# Patient Record
Sex: FEMALE | Race: BLACK OR AFRICAN AMERICAN | NOT HISPANIC OR LATINO | Employment: OTHER | URBAN - METROPOLITAN AREA
[De-identification: names, ages, dates, MRNs, and addresses within clinical notes are randomized per-mention and may not be internally consistent; named-entity substitution may affect disease eponyms.]

---

## 2017-01-20 ENCOUNTER — OFFICE VISIT (OUTPATIENT)
Dept: FAMILY MEDICINE | Facility: CLINIC | Age: 67
End: 2017-01-20
Payer: COMMERCIAL

## 2017-01-20 VITALS
WEIGHT: 246.94 LBS | DIASTOLIC BLOOD PRESSURE: 68 MMHG | TEMPERATURE: 98 F | HEART RATE: 76 BPM | OXYGEN SATURATION: 94 % | SYSTOLIC BLOOD PRESSURE: 144 MMHG | BODY MASS INDEX: 48.48 KG/M2 | HEIGHT: 60 IN

## 2017-01-20 DIAGNOSIS — I10 ESSENTIAL HYPERTENSION: ICD-10-CM

## 2017-01-20 DIAGNOSIS — G57.10 MERALGIA PARESTHETICA, UNSPECIFIED LATERALITY: ICD-10-CM

## 2017-01-20 DIAGNOSIS — E11.9 TYPE 2 DIABETES MELLITUS WITHOUT COMPLICATION, WITHOUT LONG-TERM CURRENT USE OF INSULIN: Primary | ICD-10-CM

## 2017-01-20 DIAGNOSIS — E03.9 HYPOTHYROIDISM, UNSPECIFIED TYPE: ICD-10-CM

## 2017-01-20 PROCEDURE — 3077F SYST BP >= 140 MM HG: CPT | Mod: S$GLB,,, | Performed by: FAMILY MEDICINE

## 2017-01-20 PROCEDURE — 1159F MED LIST DOCD IN RCRD: CPT | Mod: S$GLB,,, | Performed by: FAMILY MEDICINE

## 2017-01-20 PROCEDURE — 99214 OFFICE O/P EST MOD 30 MIN: CPT | Mod: S$GLB,,, | Performed by: FAMILY MEDICINE

## 2017-01-20 PROCEDURE — 3045F PR MOST RECENT HEMOGLOBIN A1C LEVEL 7.0-9.0%: CPT | Mod: S$GLB,,, | Performed by: FAMILY MEDICINE

## 2017-01-20 PROCEDURE — 99999 PR PBB SHADOW E&M-EST. PATIENT-LVL IV: CPT | Mod: PBBFAC,,, | Performed by: FAMILY MEDICINE

## 2017-01-20 PROCEDURE — 4010F ACE/ARB THERAPY RXD/TAKEN: CPT | Mod: S$GLB,,, | Performed by: FAMILY MEDICINE

## 2017-01-20 PROCEDURE — 3078F DIAST BP <80 MM HG: CPT | Mod: S$GLB,,, | Performed by: FAMILY MEDICINE

## 2017-01-20 RX ORDER — METOPROLOL SUCCINATE 25 MG/1
25 TABLET, EXTENDED RELEASE ORAL DAILY
Qty: 30 TABLET | Refills: 11 | Status: SHIPPED | OUTPATIENT
Start: 2017-01-20 | End: 2018-01-22 | Stop reason: SDUPTHER

## 2017-01-20 RX ORDER — TRAVOPROST 0.04 MG/ML
SOLUTION/ DROPS OPHTHALMIC
COMMUNITY
Start: 2017-01-11 | End: 2017-03-16

## 2017-01-20 NOTE — PROGRESS NOTES
(Portions of this note were dictated using voice recognition software and may contain dictation related errors in spelling/grammar/syntax not found on text review)    CC:   Chief Complaint   Patient presents with    Follow-up     1 month follow up for b/p       HPI: 66 y.o. female here for one-month blood pressure follow-up.  Was seen last month and blood pressure is elevated the patient was suffering with a cold at that time.  She is compliant with her losartan /25 daily.  Blood sugars may fluctuate in the systolic range from 140-160.  Denies any symptoms related to uncontrolled hypertension.  She also has diabetes, hypothyroidism which were evaluated by endocrinology but she has not upcoming appointment in March.  In the meantime I started on metformin therapy and increased her Synthroid 137 µg daily.  Tolerant of current medications next    She does go to the gym.  She is working on losing weight and has lost 10 pounds over the past 2 months.    Past Medical History   Diagnosis Date    Cataract     CKD (chronic kidney disease) stage 3, GFR 30-59 ml/min 3/21/2014    Diabetes mellitus type II     GERD (gastroesophageal reflux disease)     Glaucoma (increased eye pressure)     HLD (hyperlipidemia)     HTN (hypertension)     Hypothyroidism     Morbid obesity 3/21/2014    Vitamin D deficiency 3/21/2014       Past Surgical History   Procedure Laterality Date     section, low transverse         Family History   Problem Relation Age of Onset    Coronary artery disease Mother      premature, 57    Cancer Sister     No Known Problems Father     Diabetes      Hypertension      Thyroid disease      Glaucoma Maternal Uncle     Diabetes Maternal Uncle     No Known Problems Brother     No Known Problems Maternal Aunt     No Known Problems Paternal Aunt     No Known Problems Paternal Uncle     No Known Problems Maternal Grandmother     No Known Problems Maternal Grandfather     No Known  Problems Paternal Grandmother     No Known Problems Paternal Grandfather     Amblyopia Neg Hx     Blindness Neg Hx     Cataracts Neg Hx     Macular degeneration Neg Hx     Retinal detachment Neg Hx     Strabismus Neg Hx     Stroke Neg Hx        Social History     Social History    Marital status:      Spouse name: N/A    Number of children: N/A    Years of education: N/A     Occupational History    Not on file.     Social History Main Topics    Smoking status: Former Smoker     Types: Cigarettes    Smokeless tobacco: Not on file      Comment: occasionally    Alcohol use No    Drug use: No    Sexual activity: Not on file     Other Topics Concern    Not on file     Social History Narrative     Lab Results   Component Value Date    WBC 10.18 11/03/2016    HGB 14.5 11/03/2016    HCT 43.8 11/03/2016     11/03/2016    CHOL 235 (H) 11/03/2016    TRIG 155 (H) 11/03/2016    HDL 40 11/03/2016    ALT 21 11/03/2016    AST 21 11/03/2016     11/03/2016    K 4.1 11/03/2016     11/03/2016    CREATININE 1.2 11/03/2016    BUN 15 11/03/2016    CO2 27 11/03/2016    TSH 7.577 (H) 11/03/2016    INR 1.1 01/25/2005    HGBA1C 7.4 (H) 11/03/2016    LDLCALC 164.0 (H) 11/03/2016     (H) 11/03/2016       ROS:  GENERAL: No fever, chills, fatigability or weight loss.  SKIN: No rashes, no itching.  HEAD: No headaches.  EYES: No visual changes  EARS: No ear pain or changes in hearing.  NOSE: No congestion or rhinorrhea.  MOUTH & THROAT: No hoarseness, change in voice, or sore throat.  NODES: Denies swollen glands.  CHEST: Denies TODD, cyanosis, wheezing, cough and sputum production.  CARDIOVASCULAR: Denies chest pain, PND, orthopnea.  ABDOMEN: No nausea, vomiting, or changes in bowel function.  URINARY: No flank pain, dysuria or hematuria.  PERIPHERAL VASCULAR: No claudication or cyanosis.  MUSCULOSKELETAL: No joint stiffness or swelling. Denies back pain.  NEUROLOGIC: Does describe some left  lateral thigh burning pains with no radiation.    Vital signs reviewed.  Blood pressure 144/68  PE:   APPEARANCE: Well nourished, well developed, in no acute distress.    HEAD: Normocephalic, atraumatic.  EYES: PERRL. EOMI.   Conjunctivae noninjected.  EARS: TM's intact. Light reflex normal. No retraction or perforation  NOSE: Mucosa pink. Airway clear.  MOUTH & THROAT: No tonsillar enlargement. No pharyngeal erythema or exudate.   NECK: Supple with no cervical lymphadenopathy.  No carotid bruits.  No thyromegaly  CHEST: Good inspiratory effort. Lungs clear to auscultation with no wheezes or crackles.  CARDIOVASCULAR: Normal S1, S2. No rubs, murmurs, or gallops.  ABDOMEN: Bowel sounds normal. Not distended. Soft. No tenderness or masses. No organomegaly.  EXTREMITIES: 1+ right leg pitting edema at the ankle.  Trace left leg pitting edema      IMPRESSION  1. Type 2 diabetes mellitus without complication, without long-term current use of insulin    2. Essential hypertension    3. Hypothyroidism, unspecified type            PLAN  Orders Placed This Encounter   Procedures    Comprehensive metabolic panel    Hemoglobin A1c    Lipid panel    TSH     Hypertension: Still mildly suboptimal.  Will continue losartan /25 daily.  Add metoprolol succinate 25 mg daily.  Continue with lifestyle modification including healthy diet and regular exercise and weight loss    Check labs below next month with visit to follow.  Will go over lab results at that time along with blood pressure review    Discussed meralgia paresthetica etiology.  Reassurance provided.  Do think that weight loss will help with this

## 2017-01-20 NOTE — PATIENT INSTRUCTIONS
"GOAL BLOOD PRESSURE IS LESS THAN 140/90.    Continue losartan hct 100/25mg daily  Add metoprolol 25 mg daily to help further lower the blood pressure to goal.     The DASH Diet: Healthy Eating to Control Your Blood Pressure     What is the DASH diet?    DASH stands for Dietary Approaches to Stop Hypertension. It is a balanced eating plan that your family doctor might recommend to help you lower your blood pressure. The DASH diet:  Is low in salt, saturated fat, cholesterol and total fat.   Emphasizes fruits, vegetables, and fat-free or low-fat milk and milk products.   Includes whole grains, fish, poultry and nuts.   Limits the amount of red meat, sweets, added sugars and sugar-containing beverages in your diet.   Is rich in potassium, magnesium and calcium, as well as protein and fiber.     How can the DASH diet help me stay healthy?  Getting too much sodium (salt) in your diet can contribute to high blood pressure (also called hypertension). Some salt is in foods naturally, and some salt is added to food when it is processed or prepared. Following the DASH diet can help you lower your blood pressure, or prevent high blood pressure, by reducing the amount of sodium in your diet to less than 2,300 mg per day.  The fruits, vegetables and whole grains recommended in the DASH diet provide many other elements of a healthy diet, such as lycopene, beta-carotene and isoflavones. These can help protect your body against common health conditions, such as cancer, osteoporosis, stroke and diabetes. Following the DASH diet can also help reduce your risk of heart disease by lowering your low-density lipoprotein (LDL, or "bad") cholesterol level.  Following the DASH diet may drop your blood pressure by a few points in as little as 2 weeks. However, you should not stop taking your blood pressure medicine, or any other prescribed medicine, without talking to your doctor first.    What kinds of foods are included in the DASH " diet?  The DASH diet is nutritionally balanced for good health. You dont need to buy any special foods or pills, or cook with any special recipes, to follow the DASH diet. If you follow the DASH diet, you will eat about 2,000 calories each day. These calories will come from a variety of foods.    Where is sodium in my diet?  Food Serving Sodium Content   ¼ teaspoon table salt 575 mg   ½ teaspoon table salt 1,150 mg   1 teaspoon table salt 2,300 mg   1 hot dog 460 mg   1 regular fast-food hamburger 600 mg   2 ounces processed cheese 600 mg   1 tablespoon soy sauce 900 mg   1 serving frozen pizza with meat and vegetables 982 mg   8 ounces regular potato chips 1,192 mg     The DASH diet  Whole grains (6 to 8 servings a day)   Vegetables (4 to 5 servings a day)   Fruits (4 to 5 servings a day)   Low-fat or fat-free milk and milk products (2 to 3 servings a day)   Lean meats, poultry and fish (6 or fewer servings a day)   Nuts, seeds and beans (4 to 5 servings a week)   Fats and oils (2 to 3 servings a day)   Sweets, preferably low-fat or fat-free (5 or fewer a week)   Sodium (no more than 2,300 mg a day)   You can adapt the DASH diet to meet your needs. For example:  If you drink alcohol, limit yourself to 2 drinks or less per day for men and 1 drink or less per day for women.   To reduce your blood pressure even more, replace some of the carbohydrates in the DASH diet with low-fat protein and unsaturated fats.   If you need to lose weight, reduce the number of calories you eat to about 1,600 per day.   Follow a lower-sodium version (no more than 1,500 mg daily) if you are 40 years of age or older, you are  or you already have high blood pressure.     How can I change my eating habits?  Dont be discouraged if following the DASH diet is difficult at first. Start with small, achievable goals. The following ideas can help you make healthy changes:  Pay attention to your current eating habits. Make a list  "of everything you eat for 2 or 3 days. Compare this list to the DASH diet recommendations above and see what changes you need to make in your diet.   Make one change at a time. For example, start by choosing lower-fat versions of your favorite foods or adding more whole grains to your meals.   Learn what makes a serving for each type of food. For example, 1 serving equals 1 slice of bread, 8 ounces of milk, 1 cup of raw vegetables or 1/2 cup of cooked vegetables. For more serving sizes, go to the [facility] site. Dont have a measuring cup? One serving (3 ounces) of meat or poultry is about the size of a deck of cards. One serving (1/2 cup) of rice or potato looks like half a baseball, and a serving of cheese is about the size of 4 stacked dice.   If eating more fruits and vegetables gives you gas, bloating or diarrhea, increase these foods slowly. You can also talk to your family doctor about taking over-the-counter medicines to reduce these symptoms until your body adjusts.   Get more exercise. Physical activity helps lower your blood pressure and can help you lose more weight.   Use salt-free seasonings, such as spices and herbs, to add flavor to your recipes and reduce or eliminate salt.   Include as many fresh and unprocessed foods as possible. Cut back on frozen dinners, packaged mixes, canned soups and bottled salad dressings, which are often high in sodium.   When buying canned, frozen or processed foods, check nutrition labels for the amount of sodium, sugar and saturated fat. Look for the phrases "no salt added," "sodium-free," "low sodium" or "very low sodium" on food packages. Choose foods with monounsaturated and polyunsaturated fats.   Steam, grill, poach, roast or stir-blackwood foods. Use low-sodium broth or water instead of butter or oil for sautéing.   When you eat at a restaurant, ask how foods are prepared. Ask if your order can be made without added salt. Dont add salty condiments, such as ketchup, " mustard, pickles or sauces, to your food.   Other Organizations  Centers for Disease Control and Prevention   National Heart, Lung, and Blood [place]   Written by [email or web address] editorial staff  Reviewed/Updated: 02/11  Created: 08/09

## 2017-01-20 NOTE — MR AVS SNAPSHOT
[address]  [place] [address]  Phone: [phone]  Fax: [phone]                  Hunter Cisneros   2017 10:40 AM   Office Visit    Description:  Female : 1950   Provider:  Lenny Javier MD   Department:  [facility]           Reason for Visit     Follow-up           Diagnoses this Visit        Comments    Type 2 diabetes mellitus without complication, without long-term current use of insulin    -  Primary     Essential hypertension         Hypothyroidism, unspecified type                To Do List           Future Appointments        Provider Department Dept Phone    3/6/2017 10:30 AM MD Chepe Anderson Counts include 234 beds at the [facility] - Ophthalmology [phone]    3/7/2017 7:20 AM LAB, [place] [facility] [phone]    3/13/2017 10:20 AM Lenny Javier MD [facility] [phone]    3/14/2017 10:00 AM MD Chepe Croft Counts include 234 beds at the [facility] - Endo/Diab/Metab [phone]    3/21/2017 10:00 AM LAB, [place] [facility] [phone]      Goals (5 Years of Data)     None       These Medications        Disp Refills Start End    metoprolol succinate (TOPROL-XL) 25 MG 24 hr tablet 30 tablet 11 2017    Take 1 tablet (25 mg total) by mouth once daily. - Oral    Pharmacy: [facility] [address] [place] [address] [facility] Ph #: [phone]         [facility]     [facility] Care [facility] -  Assistance  Registered nurses in the [facility] provide clinical advisement, health education, appointment booking, and other advisory services.  Call for this free service at [phone].             Medications           Message regarding Medications     Verify the changes and/or additions to your medication regime listed below are the same as discussed with your clinician today.  If any of these changes or additions are incorrect, please notify  your healthcare provider.        START taking these NEW medications        Refills    metoprolol succinate (TOPROL-XL) 25 MG 24 hr tablet 11    Sig: Take 1 tablet (25 mg total) by mouth once daily.    Class: Normal    Route: Oral           Verify that the below list of medications is an accurate representation of the medications you are currently taking.  If none reported, the list may be blank. If incorrect, please contact your healthcare provider. Carry this list with you in case of emergency.           Current Medications     aspirin (ECOTRIN) 81 MG EC tablet Take 81 mg by mouth once daily.      atorvastatin (LIPITOR) 80 MG tablet Take 1 tablet (80 mg total) by mouth once daily.    blood sugar diagnostic (CONTOUR TEST STRIPS) Strp USE ONE  TO CHECK GLUCOSE TWICE DAILY    lancets (MICROLET LANCET) Misc Test BG 2 times daily    latanoprost 0.005 % ophthalmic solution Place 1 drop into both eyes every evening.    levothyroxine (SYNTHROID) 137 MCG Tab tablet Take 1 tablet (137 mcg total) by mouth before breakfast.    losartan-hydrochlorothiazide 100-25 mg (HYZAAR) 100-25 mg per tablet Take 1 tablet by mouth once daily.    metformin (GLUCOPHAGE) 500 MG tablet Take 1 tablet (500 mg total) by mouth 2 (two) times daily with meals.    omega 3-dha-epa-other om3-D3 (OMEGA-3 + VITAMIN D3) 2,200 mg-1,000 unit/5 mL Liqd Take by mouth.    omega-3 fatty acids 1,000 mg Cap Take 1 capsule by mouth 3 (three) times daily.     timolol maleate 0.5% (TIMOPTIC-XE) 0.5 % SolG     TRAVATAN Z 0.004 % Drop     wheat dextrin (BENEFIBER CLEAR SF, DEXTRIN,) 3 gram/3.5 gram PwPk     metoprolol succinate (TOPROL-XL) 25 MG 24 hr tablet Take 1 tablet (25 mg total) by mouth once daily.           Clinical Reference Information           Vital Signs - Last Recorded  Most recent update: 1/20/2017 10:50 AM by Lenny Javier MD    BP Pulse Temp Ht Wt SpO2    (!) 144/68 76 98.2 °F (36.8 °C) (Oral) 5' (1.524 m) 112 kg (246 lb 14.6 oz) (!) 94%    BMI                 48.22 kg/m2          Blood Pressure          Most Recent Value    BP  (!)  144/68      Allergies as of 1/20/2017     Lisinopril      Immunizations Administered on Date of Encounter - 1/20/2017     None      Orders Placed During Today's Visit     Future Labs/Procedures Expected by Expires    Comprehensive metabolic panel  1/20/2017 1/20/2018    Lipid panel  1/20/2017 1/20/2018    TSH  1/20/2017 1/20/2018    Hemoglobin A1c  As directed 1/20/2018      [facility] Sign-Up     Activating your [facility] account is as easy as 1-2-3!     1) Visit [email or web address], select Sign Up Now, enter this activation code and your date of birth, then select Next.  [email or web address]  Expires: 3/6/2017 11:02 AM      2) Create a username and password to use when you visit [facility] in the future and select a security question in case you lose your password and select Next.    3) Enter your e-mail address and click Sign Up!    Additional Information  If you have questions, please e-mail [email or web address] or call [phone] to talk to our [facility] staff. Remember, [facility] is NOT to be used for urgent needs. For medical emergencies, dial 911.         Instructions    GOAL BLOOD PRESSURE IS LESS THAN 140/90.    Continue losartan hct 100/25mg daily  Add metoprolol 25 mg daily to help further lower the blood pressure to goal.     The DASH Diet: Healthy Eating to Control Your Blood Pressure     What is the DASH diet?    DASH stands for Dietary Approaches to Stop Hypertension. It is a balanced eating plan that your family doctor might recommend to help you lower your blood pressure. The DASH diet:  Is low in salt, saturated fat, cholesterol and total fat.   Emphasizes fruits, vegetables, and fat-free or low-fat milk and milk products.   Includes whole grains, fish, poultry and nuts.   Limits the amount of red meat, sweets, added sugars and sugar-containing beverages in your diet.   Is rich in potassium, magnesium and calcium,  "as well as protein and fiber.     How can the DASH diet help me stay healthy?  Getting too much sodium (salt) in your diet can contribute to high blood pressure (also called hypertension). Some salt is in foods naturally, and some salt is added to food when it is processed or prepared. Following the DASH diet can help you lower your blood pressure, or prevent high blood pressure, by reducing the amount of sodium in your diet to less than 2,300 mg per day.  The fruits, vegetables and whole grains recommended in the DASH diet provide many other elements of a healthy diet, such as lycopene, beta-carotene and isoflavones. These can help protect your body against common health conditions, such as cancer, osteoporosis, stroke and diabetes. Following the DASH diet can also help reduce your risk of heart disease by lowering your low-density lipoprotein (LDL, or "bad") cholesterol level.  Following the DASH diet may drop your blood pressure by a few points in as little as 2 weeks. However, you should not stop taking your blood pressure medicine, or any other prescribed medicine, without talking to your doctor first.    What kinds of foods are included in the DASH diet?  The DASH diet is nutritionally balanced for good health. You dont need to buy any special foods or pills, or cook with any special recipes, to follow the DASH diet. If you follow the DASH diet, you will eat about 2,000 calories each day. These calories will come from a variety of foods.    Where is sodium in my diet?  Food Serving Sodium Content   ¼ teaspoon table salt 575 mg   ½ teaspoon table salt 1,150 mg   1 teaspoon table salt 2,300 mg   1 hot dog 460 mg   1 regular fast-food hamburger 600 mg   2 ounces processed cheese 600 mg   1 tablespoon soy sauce 900 mg   1 serving frozen pizza with meat and vegetables 982 mg   8 ounces regular potato chips 1,192 mg     The DASH diet  Whole grains (6 to 8 servings a day)   Vegetables (4 to 5 servings a day) "   Fruits (4 to 5 servings a day)   Low-fat or fat-free milk and milk products (2 to 3 servings a day)   Lean meats, poultry and fish (6 or fewer servings a day)   Nuts, seeds and beans (4 to 5 servings a week)   Fats and oils (2 to 3 servings a day)   Sweets, preferably low-fat or fat-free (5 or fewer a week)   Sodium (no more than 2,300 mg a day)   You can adapt the DASH diet to meet your needs. For example:  If you drink alcohol, limit yourself to 2 drinks or less per day for men and 1 drink or less per day for women.   To reduce your blood pressure even more, replace some of the carbohydrates in the DASH diet with low-fat protein and unsaturated fats.   If you need to lose weight, reduce the number of calories you eat to about 1,600 per day.   Follow a lower-sodium version (no more than 1,500 mg daily) if you are 40 years of age or older, you are  or you already have high blood pressure.     How can I change my eating habits?  Dont be discouraged if following the DASH diet is difficult at first. Start with small, achievable goals. The following ideas can help you make healthy changes:  Pay attention to your current eating habits. Make a list of everything you eat for 2 or 3 days. Compare this list to the DASH diet recommendations above and see what changes you need to make in your diet.   Make one change at a time. For example, start by choosing lower-fat versions of your favorite foods or adding more whole grains to your meals.   Learn what makes a serving for each type of food. For example, 1 serving equals 1 slice of bread, 8 ounces of milk, 1 cup of raw vegetables or 1/2 cup of cooked vegetables. For more serving sizes, go to the [facility] site. Dont have a measuring cup? One serving (3 ounces) of meat or poultry is about the size of a deck of cards. One serving (1/2 cup) of rice or potato looks like half a baseball, and a serving of cheese is about the size of 4 stacked dice.   If eating more  "fruits and vegetables gives you gas, bloating or diarrhea, increase these foods slowly. You can also talk to your family doctor about taking over-the-counter medicines to reduce these symptoms until your body adjusts.   Get more exercise. Physical activity helps lower your blood pressure and can help you lose more weight.   Use salt-free seasonings, such as spices and herbs, to add flavor to your recipes and reduce or eliminate salt.   Include as many fresh and unprocessed foods as possible. Cut back on frozen dinners, packaged mixes, canned soups and bottled salad dressings, which are often high in sodium.   When buying canned, frozen or processed foods, check nutrition labels for the amount of sodium, sugar and saturated fat. Look for the phrases "no salt added," "sodium-free," "low sodium" or "very low sodium" on food packages. Choose foods with monounsaturated and polyunsaturated fats.   Steam, grill, poach, roast or stir-blackwood foods. Use low-sodium broth or water instead of butter or oil for sautéing.   When you eat at a restaurant, ask how foods are prepared. Ask if your order can be made without added salt. Dont add salty condiments, such as ketchup, mustard, pickles or sauces, to your food.   Other Organizations  Centers for Disease Control and Prevention   National Heart, Lung, and Blood [place]   Written by [email or web address] editorial staff  Reviewed/Updated: 02/11  Created: 08/09            "

## 2017-03-06 ENCOUNTER — OFFICE VISIT (OUTPATIENT)
Dept: OPHTHALMOLOGY | Facility: CLINIC | Age: 67
End: 2017-03-06
Payer: COMMERCIAL

## 2017-03-06 DIAGNOSIS — H40.053 OCULAR HYPERTENSION, BILATERAL: ICD-10-CM

## 2017-03-06 DIAGNOSIS — H40.053 BILATERAL OCULAR HYPERTENSION: Primary | ICD-10-CM

## 2017-03-06 PROCEDURE — 99999 PR PBB SHADOW E&M-EST. PATIENT-LVL II: CPT | Mod: PBBFAC,,, | Performed by: OPHTHALMOLOGY

## 2017-03-06 PROCEDURE — 92133 CPTRZD OPH DX IMG PST SGM ON: CPT | Mod: S$GLB,,, | Performed by: OPHTHALMOLOGY

## 2017-03-06 PROCEDURE — 92014 COMPRE OPH EXAM EST PT 1/>: CPT | Mod: S$GLB,,, | Performed by: OPHTHALMOLOGY

## 2017-03-06 RX ORDER — LATANOPROST 50 UG/ML
1 SOLUTION/ DROPS OPHTHALMIC NIGHTLY
Qty: 2.5 ML | Refills: 11 | Status: SHIPPED | OUTPATIENT
Start: 2017-03-06 | End: 2018-03-30 | Stop reason: SDUPTHER

## 2017-03-06 NOTE — MR AVS SNAPSHOT
[facility]  [address]  [facility] [address]  Phone: [phone]  Fax: [phone]                  Hunter Cisneros   3/6/2017 10:30 AM   Office Visit    Description:  Female : 1950   Provider:  Todd Sykes MD   Department:  [facility]           Reason for Visit     Glaucoma           Diagnoses this Visit        Comments    Bilateral ocular hypertension    -  Primary     Ocular hypertension, bilateral         Nuclear cataract of both eyes                To Do List           Future Appointments        Provider Department Dept Phone    3/7/2017 7:20 AM LAB, [facility] [phone]    3/14/2017 10:00 AM MD [facility] End[facility]Diab/Metab [phone]    3/16/2017 8:40 AM Lenny Javier MD [facility] [phone]    3/21/2017 10:00 AM LAB, [place] [facility] [phone]      Goals (5 Years of Data)     None       These Medications        Disp Refills Start End    latanoprost 0.005 % ophthalmic solution 2.5 mL 11 3/6/2017     Place 1 drop into both eyes every evening. - Both Eyes    Pharmacy: [facility] [address] [place] [address] [facility] Ph #: [phone]         [facility] On Call     [facility] Care Line -  Assistance  Registered nurses in the [facility] provide clinical advisement, health education, appointment booking, and other advisory services.  Call for this free service at [phone].             Medications           Message regarding Medications     Verify the changes and/or additions to your medication regime listed below are the same as discussed with your clinician today.  If any of these changes or additions are incorrect, please notify your healthcare provider.             Verify that the below list of medications is an accurate representation of the medications you are currently taking.  If none reported,  the list may be blank. If incorrect, please contact your healthcare provider. Carry this list with you in case of emergency.           Current Medications     aspirin (ECOTRIN) 81 MG EC tablet Take 81 mg by mouth once daily.      atorvastatin (LIPITOR) 80 MG tablet Take 1 tablet (80 mg total) by mouth once daily.    blood sugar diagnostic (CONTOUR TEST STRIPS) Strp USE ONE  TO CHECK GLUCOSE TWICE DAILY    lancets (MICROLET LANCET) Misc Test BG 2 times daily    latanoprost 0.005 % ophthalmic solution Place 1 drop into both eyes every evening.    levothyroxine (SYNTHROID) 137 MCG Tab tablet Take 1 tablet (137 mcg total) by mouth before breakfast.    losartan-hydrochlorothiazide 100-25 mg (HYZAAR) 100-25 mg per tablet Take 1 tablet by mouth once daily.    metformin (GLUCOPHAGE) 500 MG tablet Take 1 tablet (500 mg total) by mouth 2 (two) times daily with meals.    metoprolol succinate (TOPROL-XL) 25 MG 24 hr tablet Take 1 tablet (25 mg total) by mouth once daily.    omega 3-dha-epa-other om3-D3 (OMEGA-3 + VITAMIN D3) 2,200 mg-1,000 unit/5 mL Liqd Take by mouth.    omega-3 fatty acids 1,000 mg Cap Take 1 capsule by mouth 3 (three) times daily.     timolol maleate 0.5% (TIMOPTIC-XE) 0.5 % SolG     TRAVATAN Z 0.004 % Drop     wheat dextrin (BENEFIBER CLEAR SF, DEXTRIN,) 3 gram/3.5 gram Pwk            Clinical Reference Information           Allergies as of 3/6/2017     Lisinopril      Immunizations Administered on Date of Encounter - 3/6/2017     None      Orders Placed During Today's Visit      Normal Orders This Visit    Posterior Segment OCT Optic Nerve- Both eyes     Future Labs/Procedures Expected by Expires    [place] Visual Field - OU - Extended - Both Eyes  As directed 3/6/2018      [facility] Sign-Up     Activating your [facility] account is as easy as 1-2-3!     1) Visit [email or web address], select Sign Up Now, enter this activation code and your date of birth, then select Next.  [email or web address]  Expires: 4/20/2017  12:41 PM      2) Create a username and password to use when you visit [facility] in the future and select a security question in case you lose your password and select Next.    3) Enter your e-mail address and click Sign Up!    Additional Information  If you have questions, please e-mail [email or web address] or call [phone] to talk to our [facility] staff. Remember, [facility] is NOT to be used for urgent needs. For medical emergencies, dial 911.         Language Assistance Services     ATTENTION: Language assistance services are available, free of charge. Please call [phone].      ATENCIÓN: Si habla dex, tiene a boyle disposición servicios gratuitos de asistencia lingüística. Llame al [phone].     CHÚ Ý: N?u b?n nói Ti?ng Vi?t, có các d?ch v? h? tr? ngôn ng? mi?n phí dành cho b?n. G?i s? [phone].         [facility] complies with applicable Federal civil rights laws and does not discriminate on the basis of race, color, national origin, age, disability, or sex.

## 2017-03-06 NOTE — PROGRESS NOTES
HPI     Glaucoma    Additional comments: 6 mon iop chk/oct/dfe           Comments     Glaucoma   Type 2 diabetes  NSC OU     Timolol Q AM OU   Travatan Z Q HS OU       Last edited by Marcos Alvarado on 3/6/2017 10:46 AM. (History)            Assessment /Plan     For exam results, see Encounter Report.    Bilateral ocular hypertension  -     [place]ona Visual Field - OU - Extended - Both Eyes; Future    Ocular hypertension, bilateral  -     Posterior Segment OCT Optic Nerve- Both eyes    Nuclear cataract of both eyes      OHT  Tpen in teens likely OK--> valsalva body habitus     +/- HVF taker      CCT  518 / 519      Hi teens / 20's      Both eye --> good adherence  Chidi q day --> Xal q day switch cost  Nemesio 0.5% q AM    NIDDM  No BDR / CSME  Control    NSC OU  Observe      Plan  RTC 4 months IOP & HVF --> switched to Xal q day IOP check  RTC sooner prn with good understanding

## 2017-03-07 ENCOUNTER — LAB VISIT (OUTPATIENT)
Dept: LAB | Facility: HOSPITAL | Age: 67
End: 2017-03-07
Attending: FAMILY MEDICINE
Payer: COMMERCIAL

## 2017-03-07 DIAGNOSIS — E03.9 HYPOTHYROIDISM, UNSPECIFIED TYPE: ICD-10-CM

## 2017-03-07 DIAGNOSIS — E11.9 TYPE 2 DIABETES MELLITUS WITHOUT COMPLICATION, WITHOUT LONG-TERM CURRENT USE OF INSULIN: ICD-10-CM

## 2017-03-07 DIAGNOSIS — I10 ESSENTIAL HYPERTENSION: ICD-10-CM

## 2017-03-07 LAB
ALBUMIN SERPL BCP-MCNC: 4 G/DL
ALP SERPL-CCNC: 83 IU/L
ALT SERPL W/O P-5'-P-CCNC: 24 IU/L
ANION GAP SERPL CALC-SCNC: 12 MMOL/L
AST SERPL-CCNC: 19 IU/L
BILIRUB SERPL-MCNC: 0.5 MG/DL
BUN SERPL-MCNC: 19 MG/DL
CALCIUM SERPL-MCNC: 9.8 MG/DL
CHLORIDE SERPL-SCNC: 102 MMOL/L
CHOLEST/HDLC SERPL: 6 {RATIO}
CO2 SERPL-SCNC: 29 MMOL/L
CREAT SERPL-MCNC: 1.27 MG/DL
EST. GFR  (AFRICAN AMERICAN): 51.5 ML/MIN/1.73 M^2
EST. GFR  (NON AFRICAN AMERICAN): 44.7 ML/MIN/1.73 M^2
GLUCOSE SERPL-MCNC: 114 MG/DL
HDL/CHOLESTEROL RATIO: 16.6 %
HDLC SERPL-MCNC: 211 MG/DL
HDLC SERPL-MCNC: 35 MG/DL
LDLC SERPL CALC-MCNC: 143 MG/DL
NONHDLC SERPL-MCNC: 176 MG/DL
POTASSIUM SERPL-SCNC: 4.6 MMOL/L
PROT SERPL-MCNC: 7.7 G/DL
SODIUM SERPL-SCNC: 143 MMOL/L
TRIGL SERPL-MCNC: 165 MG/DL

## 2017-03-07 PROCEDURE — 80053 COMPREHEN METABOLIC PANEL: CPT | Mod: PO

## 2017-03-07 PROCEDURE — 36415 COLL VENOUS BLD VENIPUNCTURE: CPT | Mod: PO

## 2017-03-07 PROCEDURE — 84443 ASSAY THYROID STIM HORMONE: CPT

## 2017-03-07 PROCEDURE — 83036 HEMOGLOBIN GLYCOSYLATED A1C: CPT | Mod: PO

## 2017-03-07 PROCEDURE — 80061 LIPID PANEL: CPT

## 2017-03-08 LAB
ESTIMATED AVG GLUCOSE: 134 MG/DL
HBA1C MFR BLD HPLC: 6.3 %
TSH SERPL DL<=0.005 MIU/L-ACNC: 3.81 UIU/ML

## 2017-03-14 ENCOUNTER — OFFICE VISIT (OUTPATIENT)
Dept: ENDOCRINOLOGY | Facility: CLINIC | Age: 67
End: 2017-03-14
Payer: COMMERCIAL

## 2017-03-14 VITALS
BODY MASS INDEX: 46.75 KG/M2 | HEIGHT: 60 IN | DIASTOLIC BLOOD PRESSURE: 66 MMHG | SYSTOLIC BLOOD PRESSURE: 136 MMHG | WEIGHT: 238.13 LBS | HEART RATE: 76 BPM

## 2017-03-14 DIAGNOSIS — E55.9 VITAMIN D DEFICIENCY: ICD-10-CM

## 2017-03-14 DIAGNOSIS — E89.0 POSTABLATIVE HYPOTHYROIDISM: ICD-10-CM

## 2017-03-14 DIAGNOSIS — E11.9 TYPE 2 DIABETES MELLITUS WITHOUT COMPLICATION, WITHOUT LONG-TERM CURRENT USE OF INSULIN: Primary | ICD-10-CM

## 2017-03-14 DIAGNOSIS — E78.5 OTHER AND UNSPECIFIED HYPERLIPIDEMIA: ICD-10-CM

## 2017-03-14 DIAGNOSIS — I10 ESSENTIAL HYPERTENSION: ICD-10-CM

## 2017-03-14 DIAGNOSIS — E66.01 MORBID OBESITY, UNSPECIFIED OBESITY TYPE: ICD-10-CM

## 2017-03-14 DIAGNOSIS — N18.30 CKD (CHRONIC KIDNEY DISEASE) STAGE 3, GFR 30-59 ML/MIN: ICD-10-CM

## 2017-03-14 PROCEDURE — 3078F DIAST BP <80 MM HG: CPT | Mod: S$GLB,,, | Performed by: INTERNAL MEDICINE

## 2017-03-14 PROCEDURE — 99214 OFFICE O/P EST MOD 30 MIN: CPT | Mod: S$GLB,,, | Performed by: INTERNAL MEDICINE

## 2017-03-14 PROCEDURE — 1160F RVW MEDS BY RX/DR IN RCRD: CPT | Mod: S$GLB,,, | Performed by: INTERNAL MEDICINE

## 2017-03-14 PROCEDURE — 3044F HG A1C LEVEL LT 7.0%: CPT | Mod: S$GLB,,, | Performed by: INTERNAL MEDICINE

## 2017-03-14 PROCEDURE — 4010F ACE/ARB THERAPY RXD/TAKEN: CPT | Mod: S$GLB,,, | Performed by: INTERNAL MEDICINE

## 2017-03-14 PROCEDURE — 99999 PR PBB SHADOW E&M-EST. PATIENT-LVL IV: CPT | Mod: PBBFAC,,, | Performed by: INTERNAL MEDICINE

## 2017-03-14 PROCEDURE — 3075F SYST BP GE 130 - 139MM HG: CPT | Mod: S$GLB,,, | Performed by: INTERNAL MEDICINE

## 2017-03-14 RX ORDER — FLUCONAZOLE 150 MG/1
150 TABLET ORAL ONCE
Qty: 1 TABLET | Refills: 0 | Status: SHIPPED | OUTPATIENT
Start: 2017-03-14 | End: 2017-03-14

## 2017-03-14 NOTE — PROGRESS NOTES
Subjective:       Patient ID: Hunter Cisneros is a 66 y.o. female.    Chief Complaint: No chief complaint on file.    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   Was taking 125mcg daily and this was increased to 137mcg/day in 11/2016 for elevated TSH 7.5  Denies Fatigue, Palpitations, Diarrhea/constipation  Sometimes heat intolerance   +dry skin at elbows     Is morbidly obese:   Lost almost 20 lbs since 11/2016  Gym-4-5 days per week; pt started in June 2015  Staying away from fast food. Has decreased portions. Trying to cut back on carbs    she was diagnosed with T2DM 7/11, with CKD 3  found on routine labs. went to education   +nocturia, taking diuretic at bid   no vision changes   No paresthesias   last eye exam 3/2017  On metformin 500mg bid and tolerating well   SMBG  Fasting 100s  Bedtime 120s  No hypoglycemia     CKD 3  Followed by renal     HTN, taking losartan-HCT    HLD, on lipitor 80mg qhs      Vit D def  Taking 1000 iu daily   Denies fractures       Review of Systems   Constitutional: Negative for fatigue and unexpected weight change (intentional 20 lbs wt loss).   Eyes: Negative for visual disturbance.   Respiratory: Negative for shortness of breath.    Cardiovascular: Negative for chest pain.   Gastrointestinal: Negative for abdominal pain.   Endocrine: Positive for heat intolerance.   Musculoskeletal: Negative for arthralgias.   Skin: Negative for wound.   Neurological: Negative for headaches.   Hematological: Does not bruise/bleed easily.   Psychiatric/Behavioral: Negative for sleep disturbance.       Objective:      Physical Exam   HENT:   Supraclavicular fullness     Neck: No thyromegaly present.   +acanthosis     Cardiovascular: Normal rate.    Pulmonary/Chest: Effort normal.   Abdominal: Soft.   +central obesity   No purple striae   Musculoskeletal: She exhibits no  edema.   Neurological:   Feet-sensation intact to vibratory and monofilament    Skin:   No bruises   Vitals reviewed.        Results for LEXII WALDROP (MRN [id number]) as of 3/14/2017 10:02   Ref. Range 11/3/2016 11:16 3/7/2017 07:28   TSH Latest Ref Range: 0.400 - 4.000 uIU/mL 7.577 (H) 3.807   Free T4 Latest Ref Range: 0.71 - 1.51 ng/dL 0.73        Results for LEXII WALDROP (MRN [id number]) as of 3/14/2017 10:02   Ref. Range 11/3/2016 11:16 3/7/2017 07:28   Hemoglobin A1C Latest Ref Range: 4.5 - 6.2 % 7.4 (H) 6.3 (H)     Results for LEXII WALDROP (MRN [id number]) as of 3/14/2017 10:35   Ref. Range 11/3/2016 15:30   Microalb Creat Ratio Latest Ref Range: 0.0 - 30.0 ug/mg 7.1       Results for LEXII WALDROP (MRN [id number]) as of 3/14/2017 10:02   Ref. Range 3/7/2017 07:28   Sodium Latest Ref Range: 136 - 145 mmol/L 143   Potassium Latest Ref Range: 3.5 - 5.1 mmol/L 4.6   Chloride Latest Ref Range: 95 - 110 mmol/L 102   CO2 Latest Ref Range: 23 - 29 mmol/L 29   Anion Gap Latest Ref Range: 8 - 16 mmol/L 12   BUN, Bld Latest Ref Range: 7 - 17 mg/dL 19 (H)   Creatinine Latest Ref Range: 0.50 - 1.40 mg/dL 1.27   eGFR if non African American Latest Ref Range: >60 mL/min/1.73 m^2 44.7 (A)   eGFR if African American Latest Ref Range: >60 mL/min/1.73 m^2 51.5 (A)   Glucose Latest Ref Range: 70 - 110 mg/dL 114 (H)   Calcium Latest Ref Range: 8.7 - 10.5 mg/dL 9.8   Alkaline Phosphatase Latest Ref Range: 38 - 126 IU/L 83   Total Protein Latest Ref Range: 6.0 - 8.4 g/dL 7.7   Albumin Latest Ref Range: 3.5 - 5.2 g/dL 4.0   Total Bilirubin Latest Ref Range: 0.1 - 1.0 mg/dL 0.5   AST Latest Ref Range: 15 - 46 IU/L 19   ALT Latest Ref Range: 10 - 44 IU/L 24   Triglycerides Latest Ref Range: 30 - 150 mg/dL 165 (H)       Results for LEXII WALDROP (MRN [id number]) as of 3/14/2017 10:02   Ref. Range 3/7/2017 07:28   Cholesterol Latest Ref Range: 120 - 199 mg/dL 211 (H)   HDL Latest Ref Range: 40 - 75 mg/dL 35 (L)   LDL Cholesterol  Latest Ref Range: 63.0 - 159.0 mg/dL 143.0   Total Cholesterol/HDL Ratio Latest Ref Range: 2.0 - 5.0  6.0 (H)   Triglycerides Latest Ref Range: 30 - 150 mg/dL 165 (H)       Assessment:       1. Type 2 diabetes mellitus without complication, without long-term current use of insulin    2. Essential hypertension    3. Postablative hypothyroidism    4. CKD (chronic kidney disease) stage 3, GFR 30-59 ml/min    5. Other and unspecified hyperlipidemia    6. Morbid obesity, unspecified obesity type    7. Vitamin D deficiency        Plan:           1. Hypothyroidism s/p I-131-   clinically and biochemically euthyroid  Cont LT4 137mcg/day   Repeat TSH in 6 months     2. HTN- Controlled    3 T2DM with CKD 3 (very mild GFRs 55)   Continue metformin 500mg bid   To help with weight loss, will add invokana 100mg daily, discussed risk of hypotension, to stay hydrated and given diflucan x1 prn   HgbA1c in 6 months  -     canagliflozin (INVOKANA) 100 mg Tab; Take 1 tablet (100 mg total) by mouth once daily.  -     fluconazole (DIFLUCAN) 150 MG Tab; Take 1 tablet (150 mg total) by mouth once.    4 dyslipidemia  Per pcp, cont lipitor     5 vit D deficiency -   Now replete. continue OTC 1000 IU QD    6-CKD stage 3  Monitor. Avoid NSAIDs.     7-Morbid Obesity  Cont exercise, discussed decreasing white starches   To try SGLT for both DM and wt loss   Consider GLP in future as well   Would like to avoid bariatrics at this time     rtc 1 year  But repeat TSH and A1c in 6 months   Discussed w Dr Aguayo

## 2017-03-14 NOTE — MR AVS SNAPSHOT
[facility]  [address]  [facility] [address]  Phone: [phone]  Fax: [phone]                  Hunter Cisneros   3/14/2017 10:00 AM   Office Visit    Description:  Female : 1950   Provider:  Renetta Guerra MD   Department:  [facility]Metab           Reason for Visit     Diabetes Mellitus     thyroid           Diagnoses this Visit        Comments    Type 2 diabetes mellitus without complication, without long-term current use of insulin    -  Primary     Essential hypertension         Postablative hypothyroidism         CKD (chronic kidney disease) stage 3, GFR 30-59 ml/min         Other and unspecified hyperlipidemia         Morbid obesity, unspecified obesity type         Vitamin D deficiency                To Do List           Future Appointments        Provider Department Dept Phone    3/16/2017 8:40 AM Lenny Javier MD [facility] [phone]    3/21/2017 10:00 AM LAB, [place] [facility] [phone]    2017 7:30 AM LAB, [place] [facility] [phone]      Goals (5 Years of Data)     None      Follow-Up and Disposition     Return in about 1 year (around 3/14/2018).       These Medications        Disp Refills Start End    canagliflozin (INVOKANA) 100 mg Tab 30 tablet 6 3/14/2017     Take 1 tablet (100 mg total) by mouth once daily. - Oral    Pharmacy: [facility] [address] [place] [address] [facility] Ph [facility]: [phone]       fluconazole (DIFLUCAN) 150 MG Tab 1 tablet 0 3/14/2017 3/14/2017    Take 1 tablet (150 mg total) by mouth once. - Oral    Pharmacy: [facility] [address] [place] [address] [facility] Ph #: [phone]         [facility] On Call     [facility] On Call Nurse Care Line -  Assistance  Registered nurses in the [facility] provide clinical advisement, health education, appointment booking, and other advisory services.  Call for this  free service at [phone].             Medications           Message regarding Medications     Verify the changes and/or additions to your medication regime listed below are the same as discussed with your clinician today.  If any of these changes or additions are incorrect, please notify your healthcare provider.        START taking these NEW medications        Refills    canagliflozin (INVOKANA) 100 mg Tab 6    Sig: Take 1 tablet (100 mg total) by mouth once daily.    Class: Normal    Route: Oral    fluconazole (DIFLUCAN) 150 MG Tab 0    Sig: Take 1 tablet (150 mg total) by mouth once.    Class: Normal    Route: Oral           Verify that the below list of medications is an accurate representation of the medications you are currently taking.  If none reported, the list may be blank. If incorrect, please contact your healthcare provider. Carry this list with you in case of emergency.           Current Medications     aspirin (ECOTRIN) 81 MG EC tablet Take 81 mg by mouth once daily.      atorvastatin (LIPITOR) 80 MG tablet Take 1 tablet (80 mg total) by mouth once daily.    blood sugar diagnostic (CONTOUR TEST STRIPS) Strp USE ONE  TO CHECK GLUCOSE TWICE DAILY    lancets (MICROLET LANCET) Misc Test BG 2 times daily    latanoprost 0.005 % ophthalmic solution Place 1 drop into both eyes every evening.    levothyroxine (SYNTHROID) 137 MCG Tab tablet Take 1 tablet (137 mcg total) by mouth before breakfast.    losartan-hydrochlorothiazide 100-25 mg (HYZAAR) 100-25 mg per tablet Take 1 tablet by mouth once daily.    metformin (GLUCOPHAGE) 500 MG tablet Take 1 tablet (500 mg total) by mouth 2 (two) times daily with meals.    metoprolol succinate (TOPROL-XL) 25 MG 24 hr tablet Take 1 tablet (25 mg total) by mouth once daily.    omega 3-dha-epa-other om3-D3 (OMEGA-3 + VITAMIN D3) 2,200 mg-1,000 unit/5 mL Liqd Take by mouth.    omega-3 fatty acids 1,000 mg Cap Take 1 capsule by mouth 3 (three) times daily.     timolol  maleate 0.5% (TIMOPTIC-XE) 0.5 % SolG     TRAVATAN Z 0.004 % Drop     wheat dextrin (BENEFIBER CLEAR SF, DEXTRIN,) 3 gram/3.5 gram PwPk     canagliflozin (INVOKANA) 100 mg Tab Take 1 tablet (100 mg total) by mouth once daily.    fluconazole (DIFLUCAN) 150 MG Tab Take 1 tablet (150 mg total) by mouth once.           Clinical Reference Information           Your Vitals Were     BP Pulse Height Weight BMI    136/66 76 5' (1.524 m) 108 kg (238 lb 1.6 oz) 46.5 kg/m2      Blood Pressure          Most Recent Value    BP  136/66      Allergies as of 3/14/2017     Lisinopril      Immunizations Administered on Date of Encounter - 3/14/2017     None      Orders Placed During Today's Visit     Future Labs/Procedures Expected by Expires    HEMOGLOBIN A1C  3/14/2017 5/13/2018    TSH  3/14/2017 5/13/2018      [facility] Sign-Up     Activating your [facility] account is as easy as 1-2-3!     1) Visit [email or web address], select Sign Up Now, enter this activation code and your date of birth, then select Next.  [email or web address]  Expires: 4/20/2017  1:41 PM      2) Create a username and password to use when you visit [facility] in the future and select a security question in case you lose your password and select Next.    3) Enter your e-mail address and click Sign Up!    Additional Information  If you have questions, please e-mail [email or web address] or call [phone] to talk to our [facility] staff. Remember, [facility] is NOT to be used for urgent needs. For medical emergencies, dial 911.         Language Assistance Services     ATTENTION: Language assistance services are available, free of charge. Please call [phone].      ATENCIÓN: Si habla español, tiene a boyle disposición servicios gratuitos de asistencia lingüística. Llame al [phone].     CHÚ Ý: N?u b?n nói Ti?ng Vi?t, có các d?ch v? h? tr? ngôn ng? mi?n phí dành cho b?n. G?i s? [phone].         [facility] complies with applicable Federal civil  rights laws and does not discriminate on the basis of race, color, national origin, age, disability, or sex.

## 2017-03-16 ENCOUNTER — TELEPHONE (OUTPATIENT)
Dept: ENDOCRINOLOGY | Facility: CLINIC | Age: 67
End: 2017-03-16

## 2017-03-16 ENCOUNTER — OFFICE VISIT (OUTPATIENT)
Dept: FAMILY MEDICINE | Facility: CLINIC | Age: 67
End: 2017-03-16
Payer: COMMERCIAL

## 2017-03-16 VITALS
WEIGHT: 238.31 LBS | SYSTOLIC BLOOD PRESSURE: 132 MMHG | HEART RATE: 73 BPM | HEIGHT: 60 IN | DIASTOLIC BLOOD PRESSURE: 65 MMHG | OXYGEN SATURATION: 96 % | BODY MASS INDEX: 46.79 KG/M2

## 2017-03-16 DIAGNOSIS — E78.5 HYPERLIPIDEMIA, UNSPECIFIED HYPERLIPIDEMIA TYPE: Primary | ICD-10-CM

## 2017-03-16 DIAGNOSIS — E11.9 TYPE 2 DIABETES MELLITUS WITHOUT COMPLICATION, WITHOUT LONG-TERM CURRENT USE OF INSULIN: ICD-10-CM

## 2017-03-16 DIAGNOSIS — E03.9 HYPOTHYROIDISM, UNSPECIFIED TYPE: ICD-10-CM

## 2017-03-16 DIAGNOSIS — I10 ESSENTIAL HYPERTENSION: ICD-10-CM

## 2017-03-16 PROCEDURE — 1160F RVW MEDS BY RX/DR IN RCRD: CPT | Mod: S$GLB,,, | Performed by: FAMILY MEDICINE

## 2017-03-16 PROCEDURE — 3078F DIAST BP <80 MM HG: CPT | Mod: S$GLB,,, | Performed by: FAMILY MEDICINE

## 2017-03-16 PROCEDURE — 99999 PR PBB SHADOW E&M-EST. PATIENT-LVL III: CPT | Mod: PBBFAC,,, | Performed by: FAMILY MEDICINE

## 2017-03-16 PROCEDURE — 4010F ACE/ARB THERAPY RXD/TAKEN: CPT | Mod: S$GLB,,, | Performed by: FAMILY MEDICINE

## 2017-03-16 PROCEDURE — 3075F SYST BP GE 130 - 139MM HG: CPT | Mod: S$GLB,,, | Performed by: FAMILY MEDICINE

## 2017-03-16 PROCEDURE — 99214 OFFICE O/P EST MOD 30 MIN: CPT | Mod: S$GLB,,, | Performed by: FAMILY MEDICINE

## 2017-03-16 PROCEDURE — 3044F HG A1C LEVEL LT 7.0%: CPT | Mod: S$GLB,,, | Performed by: FAMILY MEDICINE

## 2017-03-16 RX ORDER — ROSUVASTATIN CALCIUM 40 MG/1
40 TABLET, COATED ORAL NIGHTLY
Qty: 90 TABLET | Refills: 3 | Status: SHIPPED | OUTPATIENT
Start: 2017-03-16 | End: 2018-11-15 | Stop reason: SDUPTHER

## 2017-03-16 NOTE — PROGRESS NOTES
(Portions of this note were dictated using voice recognition software and may contain dictation related errors in spelling/grammar/syntax not found on text review)    CC:   Chief Complaint   Patient presents with    Follow-up       HPI: 66 y.o. female here for hypertension follow-up.  On losartan /25.  Blood pressure is suboptimal.  Metoprolol succinate was added at 25 mg daily at last visit in 2017. bp is controlled today/ has lost some weight; goes to the gym for exercise. Down to 238 from 255 in 2016.     She also has diabetes and hypothyroidism which have been followed by endocrinology but at last visit she had not seen endocrinology yet.  Initially she was off of medications but given abnormal A1c, her metformin was restarted.  Also her Synthroid was increased to 137 µg secondary to suboptimal TSH.  Repeat testing demonstrates normalized TSH, improving A1c of 6.4.  She did see endocrinology on .  At endocrinology visit, her blood pressure was better at 136/66    Lipid still suboptimal, LDL in the 140s.  She is compliant with Lipitor 80    Past Medical History:   Diagnosis Date    Cataract     CKD (chronic kidney disease) stage 3, GFR 30-59 ml/min 3/21/2014    Diabetes mellitus type II     GERD (gastroesophageal reflux disease)     Glaucoma (increased eye pressure)     HLD (hyperlipidemia)     HTN (hypertension)     Hypothyroidism     Morbid obesity 3/21/2014    Vitamin D deficiency 3/21/2014       Past Surgical History:   Procedure Laterality Date     SECTION, LOW TRANSVERSE         Family History   Problem Relation Age of Onset    Coronary artery disease Mother      premature, 57    Cancer Sister     No Known Problems Father     Diabetes      Hypertension      Thyroid disease      Glaucoma Maternal Uncle     Diabetes Maternal Uncle     No Known Problems Brother     No Known Problems Maternal Aunt     No Known Problems Paternal Aunt     No Known  Problems Paternal Uncle     No Known Problems Maternal Grandmother     No Known Problems Maternal Grandfather     No Known Problems Paternal Grandmother     No Known Problems Paternal Grandfather     Amblyopia Neg Hx     Blindness Neg Hx     Cataracts Neg Hx     Macular degeneration Neg Hx     Retinal detachment Neg Hx     Strabismus Neg Hx     Stroke Neg Hx        Social History     Social History    Marital status:      Spouse name: N/A    Number of children: N/A    Years of education: N/A     Occupational History    Not on file.     Social History Main Topics    Smoking status: Former Smoker     Types: Cigarettes    Smokeless tobacco: Not on file      Comment: occasionally    Alcohol use No    Drug use: No    Sexual activity: Not on file     Other Topics Concern    Not on file     Social History Narrative    No narrative on file     HEALTH SCREENINGS  Immunization: Tetanus in 2015   Pneumovax : 9/24/12  Flu: utd    Mammogram 11/10/16.    Pap:2016    Colonoscopy 2008. Multiple small hyperplastic polyps removed. Repeat in 7-10 years      Lab Results   Component Value Date    WBC 10.18 11/03/2016    HGB 14.5 11/03/2016    HCT 43.8 11/03/2016     11/03/2016    CHOL 211 (H) 03/07/2017    TRIG 165 (H) 03/07/2017    HDL 35 (L) 03/07/2017    ALT 24 03/07/2017    AST 19 03/07/2017     03/07/2017    K 4.6 03/07/2017     03/07/2017    CREATININE 1.27 03/07/2017    BUN 19 (H) 03/07/2017    CO2 29 03/07/2017    TSH 3.807 03/07/2017    INR 1.1 01/25/2005    HGBA1C 6.3 (H) 03/07/2017    LDLCALC 143.0 03/07/2017     (H) 03/07/2017       ROS:  GENERAL: No fever, chills, fatigability or weight loss.  SKIN: No rashes, no itching.  HEAD: No headaches.  EYES: No visual changes  EARS: No ear pain or changes in hearing.  NOSE: No congestion or rhinorrhea.  MOUTH & THROAT: No hoarseness, change in voice, or sore throat.  NODES: Denies swollen glands.  CHEST: Denies TODD, cyanosis,  wheezing, cough and sputum production.  CARDIOVASCULAR: Denies chest pain, PND, orthopnea.  ABDOMEN: No nausea, vomiting, or changes in bowel function.  URINARY: No flank pain, dysuria or hematuria.  PERIPHERAL VASCULAR: No claudication or cyanosis.  MUSCULOSKELETAL: No joint stiffness or swelling. Denies back pain.  NEUROLOGIC: No weakness or numbness.    Vital signs reviewed  PE:   APPEARANCE: Well nourished, well developed, in no acute distress.    HEAD: Normocephalic, atraumatic.  EYES: PERRL. EOMI.   Conjunctivae noninjected.  EARS: TM's intact. Light reflex normal. No retraction or perforation  NOSE: Mucosa pink. Airway clear.  MOUTH & THROAT: No tonsillar enlargement. No pharyngeal erythema or exudate.   NECK: Supple with no cervical lymphadenopathy.  No carotid bruits.  No thyromegaly  CHEST: Good inspiratory effort. Lungs clear to auscultation with no wheezes or crackles.  CARDIOVASCULAR: Normal S1, S2. No rubs, murmurs, or gallops.  ABDOMEN: Bowel sounds normal. Not distended. Soft. No tenderness or masses. No organomegaly.  EXTREMITIES: No edema, cyanosis, or clubbing.      IMPRESSION  1. Hyperlipidemia, unspecified hyperlipidemia type    2. Type 2 diabetes mellitus without complication, without long-term current use of insulin    3. Essential hypertension    4. Hypothyroidism, unspecified type            PLAN  Hypertension: Controlled.  Continue losartan HCT plus metoprolol    Hyperlipidemia: Not controlled.  Stop Lipitor 80.  Start Crestor 40.  Recheck labs below in 3 months next    Diabetes: Controlled.  Continue endocrine follow-up as directed.  On metformin.  Was started on Invokana but has not started this yet.  This was in an effort to help her lose more weight.    Hypothyroidism: Controlled with Synthroid 137    She will follow-up with me in the office in 6 months or sooner as needed.  Labs below in 3 months.  Follow up with endocrinology as directed    Orders Placed This Encounter   Procedures     Comprehensive metabolic panel    Lipid panel

## 2017-03-16 NOTE — MR AVS SNAPSHOT
[address]  [place] [address]  Phone: [phone]  Fax: [phone]                  Hunter Cisneros   3/16/2017 8:40 AM   Office Visit    Description:  Female : 1950   Provider:  Lenny Javier MD   Department:  [facility]           Reason for Visit     Follow-up           Diagnoses this Visit        Comments    Hyperlipidemia, unspecified hyperlipidemia type    -  Primary            To Do List           Future Appointments        Provider Department Dept Phone    3/21/2017 10:00 AM [facility] [place] [facility] [phone]    2017 7:30 AM [facility] [place] [facility] [phone]    2017 7:30 AM [facility] [place] [facility] [phone]      Goals (5 Years of Data)     None       These Medications        Disp Refills Start End    rosuvastatin (CRESTOR) 40 MG Tab 90 tablet 3 3/16/2017 3/16/2018    Take 1 tablet (40 mg total) by mouth every evening. - Oral    Pharmacy: [facility] [address] [place] [address] [address] [facility] Ph #: [phone]         [facility]     [facility] Nurse Care [facility] -  Assistance  Registered nurses in the [facility] provide clinical advisement, health education, appointment booking, and other advisory services.  Call for this free service at [phone].             Medications           Message regarding Medications     Verify the changes and/or additions to your medication regime listed below are the same as discussed with your clinician today.  If any of these changes or additions are incorrect, please notify your healthcare provider.        START taking these NEW medications        Refills    rosuvastatin (CRESTOR) 40 MG Tab 3    Sig: Take 1 tablet (40 mg total) by mouth every evening.    Class: Normal    Route: Oral      STOP taking these medications     TRAVATAN Z 0.004 % Drop     atorvastatin  (LIPITOR) 80 MG tablet Take 1 tablet (80 mg total) by mouth once daily.           Verify that the below list of medications is an accurate representation of the medications you are currently taking.  If none reported, the list may be blank. If incorrect, please contact your healthcare provider. Carry this list with you in case of emergency.           Current Medications     aspirin (ECOTRIN) 81 MG EC tablet Take 81 mg by mouth once daily.      blood sugar diagnostic (CONTOUR TEST STRIPS) Strp USE ONE  TO CHECK GLUCOSE TWICE DAILY    lancets (MICROLET LANCET) Misc Test BG 2 times daily    latanoprost 0.005 % ophthalmic solution Place 1 drop into both eyes every evening.    levothyroxine (SYNTHROID) 137 MCG Tab tablet Take 1 tablet (137 mcg total) by mouth before breakfast.    losartan-hydrochlorothiazide 100-25 mg (HYZAAR) 100-25 mg per tablet Take 1 tablet by mouth once daily.    metformin (GLUCOPHAGE) 500 MG tablet Take 1 tablet (500 mg total) by mouth 2 (two) times daily with meals.    metoprolol succinate (TOPROL-XL) 25 MG 24 hr tablet Take 1 tablet (25 mg total) by mouth once daily.    omega 3-dha-epa-other om3-D3 (OMEGA-3 + VITAMIN D3) 2,200 mg-1,000 unit/5 mL Liqd Take by mouth.    omega-3 fatty acids 1,000 mg Cap Take 1 capsule by mouth 3 (three) times daily.     timolol maleate 0.5% (TIMOPTIC-XE) 0.5 % SolG     wheat dextrin (BENEFIBER CLEAR SF, DEXTRIN,) 3 gram/3.5 gram PwPk     canagliflozin (INVOKANA) 100 mg Tab Take 1 tablet (100 mg total) by mouth once daily.    rosuvastatin (CRESTOR) 40 MG Tab Take 1 tablet (40 mg total) by mouth every evening.           Clinical Reference Information           Your Vitals Were     BP Pulse Height Weight SpO2 BMI    132/65 73 5' (1.524 m) 108.1 kg (238 lb 5.1 oz) 96% 46.54 kg/m2      Blood Pressure          Most Recent Value    BP  132/65      Allergies as of 3/16/2017     Lisinopril      Immunizations Administered on Date of Encounter - 3/16/2017     None      Orders  Placed During Today's Visit     Future Labs/Procedures Expected by Expires    Lipid panel  6/14/2017 5/15/2018    Comprehensive metabolic panel  6/16/2017 3/16/2018      [facility] Sign-Up     Activating your [facility] account is as easy as 1-2-3!     1) Visit [email or web address], select Sign Up Now, enter this activation code and your date of birth, then select Next.  [email or web address]  Expires: 4/20/2017  1:41 PM      2) Create a username and password to use when you visit [facility] in the future and select a security question in case you lose your password and select Next.    3) Enter your e-mail address and click Sign Up!    Additional Information  If you have questions, please e-mail [email or web address] or call [phone] to talk to our [facility] staff. Remember, [facility] is NOT to be used for urgent needs. For medical emergencies, dial 911.         Language Assistance Services     ATTENTION: Language assistance services are available, free of charge. Please call [phone].      ATENCIÓN: Si habla español, tiene a boyle disposición servicios gratuitos de asistencia lingüística. Llame al [phone].     CHÚ Ý: N?u b?n nói Ti?ng Vi?t, có các d?ch v? h? tr? ngôn ng? mi?n phí dành cho b?n. G?i s? [phone].         [facility] complies with applicable Federal civil rights laws and does not discriminate on the basis of race, color, national origin, age, disability, or sex.

## 2017-03-27 NOTE — PROGRESS NOTES
I have personally taken the history and examined this patient and agree with the resident's or fellow's note as stated above   Add Invokana and Crestor.    Layo Aguayo MD, FACE

## 2017-05-08 ENCOUNTER — OFFICE VISIT (OUTPATIENT)
Dept: OPTOMETRY | Facility: CLINIC | Age: 67
End: 2017-05-08
Payer: COMMERCIAL

## 2017-05-08 DIAGNOSIS — H52.4 HYPEROPIA WITH ASTIGMATISM AND PRESBYOPIA, BILATERAL: ICD-10-CM

## 2017-05-08 DIAGNOSIS — H25.13 NUCLEAR SCLEROSIS, BILATERAL: Primary | ICD-10-CM

## 2017-05-08 DIAGNOSIS — H52.03 HYPEROPIA WITH ASTIGMATISM AND PRESBYOPIA, BILATERAL: ICD-10-CM

## 2017-05-08 DIAGNOSIS — H52.203 HYPEROPIA WITH ASTIGMATISM AND PRESBYOPIA, BILATERAL: ICD-10-CM

## 2017-05-08 DIAGNOSIS — H40.053 OCULAR HYPERTENSION, BILATERAL: ICD-10-CM

## 2017-05-08 PROCEDURE — 92015 DETERMINE REFRACTIVE STATE: CPT | Mod: S$GLB,,, | Performed by: OPTOMETRIST

## 2017-05-08 PROCEDURE — 92012 INTRM OPH EXAM EST PATIENT: CPT | Mod: S$GLB,,, | Performed by: OPTOMETRIST

## 2017-05-08 PROCEDURE — 99999 PR PBB SHADOW E&M-EST. PATIENT-LVL III: CPT | Mod: PBBFAC,,, | Performed by: OPTOMETRIST

## 2017-05-08 NOTE — MR AVS SNAPSHOT
[facility]  [address]  [place] [address]  Phone: [phone]  Fax: [phone]                  Hunter Cisneros   2017 10:45 AM   Office Visit    Description:  Female : 1950   Provider:  Cesario Aquino OD   Department:  [facility]           Reason for Visit     Concerns About Ocular Health                To Do List           Future Appointments        Provider Department Dept Phone    2017 7:30 AM [facility] [place] [facility] [phone]    2017 7:30 AM [facility] [place] [facility] [phone]      Goals (5 Years of Data)     None      [facility]     [facility] On Call Nurse Care Line -  Assistance  Unless otherwise directed by your provider, please contact [facility], our nurse care line that is available for  assistance.     Registered nurses in the [facility] provide: appointment scheduling, clinical advisement, health education, and other advisory services.  Call: [phone] (toll free)               Medications           Message regarding Medications     Verify the changes and/or additions to your medication regime listed below are the same as discussed with your clinician today.  If any of these changes or additions are incorrect, please notify your healthcare provider.             Verify that the below list of medications is an accurate representation of the medications you are currently taking.  If none reported, the list may be blank. If incorrect, please contact your healthcare provider. Carry this list with you in case of emergency.           Current Medications     aspirin (ECOTRIN) 81 MG EC tablet Take 81 mg by mouth once daily.      blood sugar diagnostic (CONTOUR TEST STRIPS) Strp USE ONE  TO CHECK GLUCOSE TWICE DAILY    canagliflozin (INVOKANA) 100 mg Tab Take 1 tablet (100 mg total) by mouth once daily.    lancets (MICROLET LANCET) Misc Test BG 2 times daily     latanoprost 0.005 % ophthalmic solution Place 1 drop into both eyes every evening.    levothyroxine (SYNTHROID) 137 MCG Tab tablet Take 1 tablet (137 mcg total) by mouth before breakfast.    losartan-hydrochlorothiazide 100-25 mg (HYZAAR) 100-25 mg per tablet Take 1 tablet by mouth once daily.    metformin (GLUCOPHAGE) 500 MG tablet Take 1 tablet (500 mg total) by mouth 2 (two) times daily with meals.    metoprolol succinate (TOPROL-XL) 25 MG 24 hr tablet Take 1 tablet (25 mg total) by mouth once daily.    omega 3-dha-epa-other om3-D3 (OMEGA-3 + VITAMIN D3) 2,200 mg-1,000 unit/5 mL Liqd Take by mouth.    omega-3 fatty acids 1,000 mg Cap Take 1 capsule by mouth 3 (three) times daily.     rosuvastatin (CRESTOR) 40 MG Tab Take 1 tablet (40 mg total) by mouth every evening.    timolol maleate 0.5% (TIMOPTIC-XE) 0.5 % SolG     wheat dextrin (BENEFIBER CLEAR SF, DEXTRIN,) 3 gram/3.5 gram [facility]            Clinical Reference Information           Allergies as of 5/8/2017     Lisinopril      Immunizations Administered on Date of Encounter - 5/8/2017     None      [facility] Sign-Up     Activating your [facility] account is as easy as 1-2-3!     1) Visit [email or web address], select Sign Up Now, enter this activation code and your date of birth, then select Next.  [email or web address]  Expires: 6/22/2017 12:06 PM      2) Create a username and password to use when you visit [facility] in the future and select a security question in case you lose your password and select Next.    3) Enter your e-mail address and click Sign Up!    Additional Information  If you have questions, please e-mail [email or web address] or call [phone] to talk to our [facility] staff. Remember, [facility] is NOT to be used for urgent needs. For medical emergencies, dial 911.         Instructions    Prior diagnosis of bilateral ocular hypertension  Poor visual field test taker  Prior HVF deemed unusable in both eyes, due to excessive fixation losses, and  excessive false positive responses, and excessive false negative responses.   Currently using Latanoprost 0.005% ophthalmic solution in both eyes every evenining ( in lieu of Travatan-  Z), and Timolol Maleate 0.5% every morning in both eyes, for IOP control  Today IOP high-normal in OD, and high-normal/borderline in OS.  Followed by Dr. Sykes.    Early nuclear sclerosis of lens of both eyes, and bilateral peripheral cortical cataract. No need for cataract surgery.    Hyperopia with astigmatism in each eye and presbyopia consistent with age.  New spectacle lens Rx issued for full-time wear.    Continue present drops for IOP control in both eyes, as outlined above.  Follow up with Dr. Sykes as he recommends.  Recheck refraction in one year - or prior if any problems or changes in VA with glasses noted in the interim.                 New spectacle lens rx issued for full-time wear.  Continue present drops for IOP control in both eyes.  Follow up with Dr. Sykes as he recommends.'    Recheck refraction in one year.        Language Assistance Services     ATTENTION: Language assistance services are available, free of charge. Please call [phone].      ATENCIÓN: Si aroldo kowalski, tiene a boyle disposición servicios gratuitos de asistencia lingüística. Llame al [phone].     KLAUDIA Ý: N?u b?n nói Ti?ng Vi?t, có các d?ch v? h? tr? ngôn ng? mi?n phí dành cho b?n. G?i s? [phone].         [facility] complies with applicable Federal civil rights laws and does not discriminate on the basis of race, color, national origin, age, disability, or sex.

## 2017-05-08 NOTE — PATIENT INSTRUCTIONS
Prior diagnosis of bilateral ocular hypertension  Poor visual field test taker  Prior HVF deemed unusable in both eyes, due to excessive fixation losses, and excessive false positive responses, and excessive false negative responses.   Currently using Latanoprost 0.005% ophthalmic solution in both eyes every evening ( in lieu of Travatan-  Z), and Timolol Maleate 0.5% gel-forming solution every morning in both eyes, for IOP control  Today IOP high-normal in OD, and high-normal/borderline in OS.  Followed by Dr. Sykes.    Early nuclear sclerosis of lens of both eyes, and bilateral peripheral cortical cataract. No need for cataract surgery.    Hyperopia with astigmatism in each eye and presbyopia consistent with age.  New spectacle lens Rx issued for full-time wear.    Continue present drops for IOP control in both eyes, as outlined above.  Follow up with Dr. Sykes as he recommends.  Recheck refraction in one year - or prior if any problems or changes in VA with glasses noted in the interim.

## 2017-05-18 RX ORDER — LOSARTAN POTASSIUM AND HYDROCHLOROTHIAZIDE 25; 100 MG/1; MG/1
TABLET ORAL
Qty: 30 TABLET | Refills: 11 | Status: SHIPPED | OUTPATIENT
Start: 2017-05-18 | End: 2018-05-31 | Stop reason: SDUPTHER

## 2017-06-16 ENCOUNTER — LAB VISIT (OUTPATIENT)
Dept: LAB | Facility: HOSPITAL | Age: 67
End: 2017-06-16
Attending: FAMILY MEDICINE
Payer: COMMERCIAL

## 2017-06-16 DIAGNOSIS — E78.5 HYPERLIPIDEMIA, UNSPECIFIED HYPERLIPIDEMIA TYPE: ICD-10-CM

## 2017-06-16 LAB
ALBUMIN SERPL BCP-MCNC: 4.2 G/DL
ALP SERPL-CCNC: 83 U/L
ALT SERPL W/O P-5'-P-CCNC: 25 U/L
ANION GAP SERPL CALC-SCNC: 14 MMOL/L
AST SERPL-CCNC: 22 U/L
BILIRUB SERPL-MCNC: 0.6 MG/DL
BUN SERPL-MCNC: 23 MG/DL
CALCIUM SERPL-MCNC: 10 MG/DL
CHLORIDE SERPL-SCNC: 103 MMOL/L
CHOLEST/HDLC SERPL: 3.5 {RATIO}
CO2 SERPL-SCNC: 27 MMOL/L
CREAT SERPL-MCNC: 1.19 MG/DL
EST. GFR  (AFRICAN AMERICAN): 55.8 ML/MIN/1.73 M^2
EST. GFR  (NON AFRICAN AMERICAN): 48.4 ML/MIN/1.73 M^2
GLUCOSE SERPL-MCNC: 128 MG/DL
HDL/CHOLESTEROL RATIO: 28.6 %
HDLC SERPL-MCNC: 28 MG/DL
HDLC SERPL-MCNC: 98 MG/DL
LDLC SERPL CALC-MCNC: 46 MG/DL
NONHDLC SERPL-MCNC: 70 MG/DL
POTASSIUM SERPL-SCNC: 4 MMOL/L
PROT SERPL-MCNC: 8 G/DL
SODIUM SERPL-SCNC: 144 MMOL/L
TRIGL SERPL-MCNC: 120 MG/DL

## 2017-06-16 PROCEDURE — 80053 COMPREHEN METABOLIC PANEL: CPT | Mod: PO

## 2017-06-16 PROCEDURE — 80061 LIPID PANEL: CPT

## 2017-06-16 PROCEDURE — 36415 COLL VENOUS BLD VENIPUNCTURE: CPT | Mod: PO

## 2017-08-18 ENCOUNTER — CLINICAL SUPPORT (OUTPATIENT)
Dept: OPHTHALMOLOGY | Facility: CLINIC | Age: 67
End: 2017-08-18
Payer: COMMERCIAL

## 2017-08-18 ENCOUNTER — OFFICE VISIT (OUTPATIENT)
Dept: OPHTHALMOLOGY | Facility: CLINIC | Age: 67
End: 2017-08-18
Payer: COMMERCIAL

## 2017-08-18 DIAGNOSIS — H40.053 BILATERAL OCULAR HYPERTENSION: Primary | ICD-10-CM

## 2017-08-18 PROCEDURE — 92012 INTRM OPH EXAM EST PATIENT: CPT | Mod: S$GLB,,, | Performed by: OPHTHALMOLOGY

## 2017-08-18 PROCEDURE — 99999 PR PBB SHADOW E&M-EST. PATIENT-LVL II: CPT | Mod: PBBFAC,,, | Performed by: OPHTHALMOLOGY

## 2017-08-18 PROCEDURE — 92083 EXTENDED VISUAL FIELD XM: CPT | Mod: S$GLB,,, | Performed by: OPHTHALMOLOGY

## 2017-08-18 NOTE — PROGRESS NOTES
HPI     Glaucoma    Additional comments: 4 mon iop chk/hvf rev           Comments     Glaucoma   Type 2 diabetes  NSC OU     Timolol  QAM OU   Latanoprost QHS OU       Last edited by Marcos Alvarado on 8/18/2017 11:27 AM. (History)            Assessment /Plan     For exam results, see Encounter Report.    Bilateral ocular hypertension  -     [place] Visual Field - OU - Extended - Both Eyes  -     Posterior Segment OCT Optic Nerve- Both eyes; Future    Nuclear cataract of both eyes        OHT  Tpen in teens likely OK--> valsalva body habitus     +/- HVF taker    CCT  518 / 519      Hi teens - Low 20's      Both eye --> good adherence  Xal q day   Nemesio 0.5% q AM    NIDDM  No BDR / CSME  Control    NSC OU  Observe      Plan  RTC 6 months IOP & OCT RNFL --> re-establish care with Dr Aquino  RTC Mony PRN  RTC sooner prn with good understanding

## 2017-09-14 ENCOUNTER — LAB VISIT (OUTPATIENT)
Dept: LAB | Facility: HOSPITAL | Age: 67
End: 2017-09-14
Attending: INTERNAL MEDICINE
Payer: COMMERCIAL

## 2017-09-14 ENCOUNTER — TELEPHONE (OUTPATIENT)
Dept: ENDOCRINOLOGY | Facility: CLINIC | Age: 67
End: 2017-09-14

## 2017-09-14 DIAGNOSIS — E11.9 TYPE 2 DIABETES MELLITUS WITHOUT COMPLICATION, WITHOUT LONG-TERM CURRENT USE OF INSULIN: ICD-10-CM

## 2017-09-14 DIAGNOSIS — E03.9 HYPOTHYROIDISM, UNSPECIFIED TYPE: Primary | ICD-10-CM

## 2017-09-14 DIAGNOSIS — E89.0 POSTABLATIVE HYPOTHYROIDISM: ICD-10-CM

## 2017-09-14 LAB
ESTIMATED AVG GLUCOSE: 120 MG/DL
HBA1C MFR BLD HPLC: 5.8 %
T4 FREE SERPL-MCNC: 1.15 NG/DL
TSH SERPL DL<=0.005 MIU/L-ACNC: <0.026 UIU/ML

## 2017-09-14 PROCEDURE — 84443 ASSAY THYROID STIM HORMONE: CPT | Mod: PO

## 2017-09-14 PROCEDURE — 36415 COLL VENOUS BLD VENIPUNCTURE: CPT | Mod: PO

## 2017-09-14 PROCEDURE — 83036 HEMOGLOBIN GLYCOSYLATED A1C: CPT | Mod: PO

## 2017-09-14 PROCEDURE — 84439 ASSAY OF FREE THYROXINE: CPT

## 2017-09-14 RX ORDER — LEVOTHYROXINE SODIUM 137 UG/1
TABLET ORAL
Qty: 90 TABLET | Refills: 3
Start: 2017-09-14 | End: 2017-11-10 | Stop reason: SDUPTHER

## 2017-09-14 NOTE — TELEPHONE ENCOUNTER
Pt clinically euthyroid   Lab Results   Component Value Date    TSH <0.026 (L) 09/14/2017     Decrease lt4 from 137 daily to 137 x6.5 days repeat in 89 week   Refill for invokana given

## 2017-09-22 ENCOUNTER — OFFICE VISIT (OUTPATIENT)
Dept: FAMILY MEDICINE | Facility: CLINIC | Age: 67
End: 2017-09-22
Payer: COMMERCIAL

## 2017-09-22 VITALS
WEIGHT: 213.63 LBS | SYSTOLIC BLOOD PRESSURE: 138 MMHG | DIASTOLIC BLOOD PRESSURE: 60 MMHG | BODY MASS INDEX: 41.94 KG/M2 | OXYGEN SATURATION: 99 % | HEIGHT: 60 IN | HEART RATE: 73 BPM

## 2017-09-22 DIAGNOSIS — Z23 NEED FOR PROPHYLACTIC VACCINATION AGAINST STREPTOCOCCUS PNEUMONIAE (PNEUMOCOCCUS): ICD-10-CM

## 2017-09-22 DIAGNOSIS — E03.9 HYPOTHYROIDISM, UNSPECIFIED TYPE: ICD-10-CM

## 2017-09-22 DIAGNOSIS — E11.9 TYPE 2 DIABETES MELLITUS WITHOUT COMPLICATION, WITHOUT LONG-TERM CURRENT USE OF INSULIN: ICD-10-CM

## 2017-09-22 DIAGNOSIS — Z13.820 OSTEOPOROSIS SCREENING: ICD-10-CM

## 2017-09-22 DIAGNOSIS — Z78.0 MENOPAUSE: ICD-10-CM

## 2017-09-22 DIAGNOSIS — I10 ESSENTIAL HYPERTENSION: Primary | ICD-10-CM

## 2017-09-22 DIAGNOSIS — Z12.31 ENCOUNTER FOR SCREENING MAMMOGRAM FOR BREAST CANCER: ICD-10-CM

## 2017-09-22 PROCEDURE — 99999 PR PBB SHADOW E&M-EST. PATIENT-LVL III: CPT | Mod: PBBFAC,,, | Performed by: FAMILY MEDICINE

## 2017-09-22 PROCEDURE — 3008F BODY MASS INDEX DOCD: CPT | Mod: S$GLB,,, | Performed by: FAMILY MEDICINE

## 2017-09-22 PROCEDURE — 99214 OFFICE O/P EST MOD 30 MIN: CPT | Mod: 25,S$GLB,, | Performed by: FAMILY MEDICINE

## 2017-09-22 PROCEDURE — 90471 IMMUNIZATION ADMIN: CPT | Mod: S$GLB,,, | Performed by: FAMILY MEDICINE

## 2017-09-22 PROCEDURE — 90670 PCV13 VACCINE IM: CPT | Mod: S$GLB,,, | Performed by: FAMILY MEDICINE

## 2017-09-22 PROCEDURE — 3078F DIAST BP <80 MM HG: CPT | Mod: S$GLB,,, | Performed by: FAMILY MEDICINE

## 2017-09-22 PROCEDURE — 3075F SYST BP GE 130 - 139MM HG: CPT | Mod: S$GLB,,, | Performed by: FAMILY MEDICINE

## 2017-09-22 NOTE — PROGRESS NOTES
(Portions of this note were dictated using voice recognition software and may contain dictation related errors in spelling/grammar/syntax not found on text review)    CC:   Chief Complaint   Patient presents with    Follow-up       HPI: 67 y.o. female lov 3/16/17    Hypertension on losartan /25+ metoprolol 25 mg daily. bp controlled wt down signficantly. Exercising a lot and eating well.     Hyperlipidemia, started on Crestor 40 mg last visit in place of Lipitor 80 mg    Diabetes, controlled on metformin and invokana. Follows up with endo.   Last A1c as below    Hypothyroidism on Synthroid, 137 but given suppressed TSH was advised to decrease this to 6 days a week and half pill one day a week (was on 125 in the past but TSH was too high)    Past Medical History:   Diagnosis Date    Cataract     CKD (chronic kidney disease) stage 3, GFR 30-59 ml/min 3/21/2014    Diabetes mellitus type II     GERD (gastroesophageal reflux disease)     Glaucoma (increased eye pressure)     HLD (hyperlipidemia)     HTN (hypertension)     Hypothyroidism     Morbid obesity 3/21/2014    Vitamin D deficiency 3/21/2014       Past Surgical History:   Procedure Laterality Date     SECTION, LOW TRANSVERSE         Family History   Problem Relation Age of Onset    Coronary artery disease Mother      premature, 57    Cancer Sister     No Known Problems Father     Diabetes      Hypertension      Thyroid disease      Glaucoma Maternal Uncle     Diabetes Maternal Uncle     No Known Problems Brother     No Known Problems Maternal Aunt     No Known Problems Paternal Aunt     No Known Problems Paternal Uncle     No Known Problems Maternal Grandmother     No Known Problems Maternal Grandfather     No Known Problems Paternal Grandmother     No Known Problems Paternal Grandfather     Amblyopia Neg Hx     Blindness Neg Hx     Cataracts Neg Hx     Macular degeneration Neg Hx     Retinal detachment Neg Hx      Strabismus Neg Hx     Stroke Neg Hx        Social History     Social History    Marital status:      Spouse name: N/A    Number of children: N/A    Years of education: N/A     Occupational History    Not on file.     Social History Main Topics    Smoking status: Former Smoker     Types: Cigarettes    Smokeless tobacco: Not on file      Comment: occasionally    Alcohol use No    Drug use: No    Sexual activity: Not on file     Other Topics Concern    Not on file     Social History Narrative    No narrative on file     HEALTH SCREENINGS  Immunization: Tetanus in 2015   Pneumovax : 9/24/12  Flu: utd  Pcv: today  Zvx: UTD  Mammogram 11/10/16.     Pap:2016     Colonoscopy 2008. Multiple small hyperplastic polyps removed. Repeat in 7-10 years       Lab Results   Component Value Date    WBC 10.18 11/03/2016    HGB 14.5 11/03/2016    HCT 43.8 11/03/2016     11/03/2016    CHOL 98 (L) 06/16/2017    TRIG 120 06/16/2017    HDL 28 (L) 06/16/2017    ALT 25 06/16/2017    AST 22 06/16/2017     06/16/2017    K 4.0 06/16/2017     06/16/2017    CREATININE 1.19 06/16/2017    BUN 23 (H) 06/16/2017    CO2 27 06/16/2017    TSH <0.026 (L) 09/14/2017    INR 1.1 01/25/2005    HGBA1C 5.8 (H) 09/14/2017    LDLCALC 46.0 (L) 06/16/2017     (H) 06/16/2017       ROS:  GENERAL: No fever, chills, fatigability or weight loss.  SKIN: No rashes, no itching.  HEAD: No headaches.  EYES: No visual changes  EARS: No ear pain or changes in hearing.  NOSE: No congestion or rhinorrhea.  MOUTH & THROAT: No hoarseness, change in voice, or sore throat.  NODES: Denies swollen glands.  CHEST: Denies TODD, cyanosis, wheezing, cough and sputum production.  CARDIOVASCULAR: Denies chest pain, PND, orthopnea.  ABDOMEN: No nausea, vomiting, or changes in bowel function.  URINARY: No flank pain, dysuria or hematuria.  PERIPHERAL VASCULAR: No claudication or cyanosis.  MUSCULOSKELETAL: No joint stiffness or swelling. Denies back  pain.  NEUROLOGIC: No weakness or numbness.    Vital signs reviewed  PE:   APPEARANCE: Well nourished, well developed, in no acute distress.    HEAD: Normocephalic, atraumatic.  EYES: PERRL. EOMI.   Conjunctivae noninjected.  EARS: TM's intact. Light reflex normal. No retraction or perforation  NOSE: Mucosa pink. Airway clear.  MOUTH & THROAT: No tonsillar enlargement. No pharyngeal erythema or exudate.   NECK: Supple with no cervical lymphadenopathy.  No carotid bruits, no thyromegaly  CHEST: Good inspiratory effort. Lungs clear to auscultation with no wheezes or crackles.  CARDIOVASCULAR: Normal S1, S2. No rubs, murmurs, or gallops.  ABDOMEN: Bowel sounds normal. Not distended. Soft. No tenderness or masses. No organomegaly.  EXTREMITIES: No edema, cyanosis, or clubbing.  DIABETIC FOOT EXAM: Protective Sensation (w/ 10 gram monofilament):  Right: Intact  Left: Intact    Visual Inspection:  Normal -  Bilateral    Pedal Pulses:   Right: Present  Left: Present    Posterior tibialis:   Right:Present  Left: Present        IMPRESSION    1. Essential hypertension    2. Encounter for screening mammogram for breast cancer    3. Menopause    4. Osteoporosis screening    5. Hypothyroidism, unspecified type    6. Type 2 diabetes mellitus without complication, without long-term current use of insulin    7. Need for prophylactic vaccination against Streptococcus pneumoniae (pneumococcus)          PLAN  Hypertension controlled.  Continue current therapy    Hypothyroidism: Continue current regimen as directed by endocrinology.  She will get recheck blood work in the next couple months    Diabetes: Controlled.  Continue current therapy Foot exam done today.    Screenings: Need pneumococcal vaccination with Prevnar, updated Pneumovax next year.  Advise annual flu shot at local pharmacy since it is not available through our office yet.  Mammogram ordered for November.  DEXA ordered given osteoporosis screening guidelines

## 2017-11-09 ENCOUNTER — LAB VISIT (OUTPATIENT)
Dept: LAB | Facility: HOSPITAL | Age: 67
End: 2017-11-09
Attending: INTERNAL MEDICINE
Payer: COMMERCIAL

## 2017-11-09 DIAGNOSIS — E03.9 HYPOTHYROIDISM, UNSPECIFIED TYPE: ICD-10-CM

## 2017-11-09 LAB
T4 FREE SERPL-MCNC: 1.46 NG/DL
TSH SERPL DL<=0.005 MIU/L-ACNC: <0.026 UIU/ML

## 2017-11-09 PROCEDURE — 84443 ASSAY THYROID STIM HORMONE: CPT | Mod: PO

## 2017-11-09 PROCEDURE — 36415 COLL VENOUS BLD VENIPUNCTURE: CPT | Mod: PO

## 2017-11-09 PROCEDURE — 84439 ASSAY OF FREE THYROXINE: CPT

## 2017-11-10 ENCOUNTER — TELEPHONE (OUTPATIENT)
Dept: ENDOCRINOLOGY | Facility: HOSPITAL | Age: 67
End: 2017-11-10

## 2017-11-10 DIAGNOSIS — E03.9 PRIMARY HYPOTHYROIDISM: Primary | ICD-10-CM

## 2017-11-10 RX ORDER — LEVOTHYROXINE SODIUM 137 UG/1
TABLET ORAL
Qty: 90 TABLET | Refills: 3
Start: 2017-11-10 | End: 2017-12-18 | Stop reason: SDUPTHER

## 2017-11-10 NOTE — TELEPHONE ENCOUNTER
Lab Results   Component Value Date    TSH <0.026 (L) 11/09/2017     She was decreased from 137 x7 days down to x6.5 days in 9/2017 and TSH unchanged. Clinically euthyroid. Not taking biotin.     Decrease to 137 x6 days, repeat in 10 weeks.

## 2017-11-15 ENCOUNTER — HOSPITAL ENCOUNTER (OUTPATIENT)
Dept: RADIOLOGY | Facility: HOSPITAL | Age: 67
Discharge: HOME | End: 2017-11-15
Attending: FAMILY MEDICINE
Payer: COMMERCIAL

## 2017-11-15 VITALS — BODY MASS INDEX: 41.82 KG/M2 | WEIGHT: 213 LBS | HEIGHT: 60 IN

## 2017-11-15 DIAGNOSIS — Z12.31 ENCOUNTER FOR SCREENING MAMMOGRAM FOR BREAST CANCER: ICD-10-CM

## 2017-11-15 DIAGNOSIS — Z13.820 OSTEOPOROSIS SCREENING: ICD-10-CM

## 2017-11-15 DIAGNOSIS — Z78.0 MENOPAUSE: ICD-10-CM

## 2017-11-15 PROCEDURE — 77067 SCR MAMMO BI INCL CAD: CPT | Mod: TC

## 2017-11-15 PROCEDURE — 77080 DXA BONE DENSITY AXIAL: CPT | Mod: TC,PO

## 2017-12-11 RX ORDER — LEVOTHYROXINE SODIUM 137 UG/1
TABLET ORAL
Qty: 90 TABLET | Refills: 3 | OUTPATIENT
Start: 2017-12-11

## 2017-12-15 RX ORDER — LEVOTHYROXINE SODIUM 137 UG/1
TABLET ORAL
Qty: 90 TABLET | Refills: 3 | OUTPATIENT
Start: 2017-12-15

## 2017-12-18 NOTE — TELEPHONE ENCOUNTER
----- Message from Velia Monahan sent at 12/18/2017  2:07 PM CST -----  Contact: Self [phone]  ..Refill request.  levothyroxine (SYNTHROID) 137 MCG Tab tablet     ..  [facility] [address] [place], [place] [address], [place] [address] [facility]  [address] [facility] [address]  Phone: [phone] Fax: [phone]

## 2017-12-19 RX ORDER — LEVOTHYROXINE SODIUM 137 UG/1
TABLET ORAL
Qty: 90 TABLET | Refills: 3 | Status: SHIPPED | OUTPATIENT
Start: 2017-12-19 | End: 2018-12-06 | Stop reason: SDUPTHER

## 2018-01-22 ENCOUNTER — LAB VISIT (OUTPATIENT)
Dept: LAB | Facility: HOSPITAL | Age: 68
End: 2018-01-22
Attending: INTERNAL MEDICINE
Payer: COMMERCIAL

## 2018-01-22 DIAGNOSIS — E03.9 PRIMARY HYPOTHYROIDISM: ICD-10-CM

## 2018-01-22 LAB — TSH SERPL DL<=0.005 MIU/L-ACNC: 0.6 UIU/ML

## 2018-01-22 PROCEDURE — 84443 ASSAY THYROID STIM HORMONE: CPT | Mod: PO

## 2018-01-22 PROCEDURE — 36415 COLL VENOUS BLD VENIPUNCTURE: CPT | Mod: PO

## 2018-01-22 RX ORDER — METOPROLOL SUCCINATE 25 MG/1
TABLET, EXTENDED RELEASE ORAL
Qty: 30 TABLET | Refills: 11 | Status: SHIPPED | OUTPATIENT
Start: 2018-01-22 | End: 2018-11-15

## 2018-03-30 DIAGNOSIS — H40.053 BILATERAL OCULAR HYPERTENSION: ICD-10-CM

## 2018-04-02 RX ORDER — LATANOPROST 50 UG/ML
SOLUTION/ DROPS OPHTHALMIC
Qty: 3 BOTTLE | Refills: 11 | Status: SHIPPED | OUTPATIENT
Start: 2018-04-02 | End: 2019-04-10 | Stop reason: SDUPTHER

## 2018-04-11 ENCOUNTER — LAB VISIT (OUTPATIENT)
Dept: LAB | Facility: HOSPITAL | Age: 68
End: 2018-04-11
Attending: INTERNAL MEDICINE
Payer: COMMERCIAL

## 2018-04-11 DIAGNOSIS — E03.9 PRIMARY HYPOTHYROIDISM: ICD-10-CM

## 2018-04-11 LAB — TSH SERPL DL<=0.005 MIU/L-ACNC: 0.69 UIU/ML

## 2018-04-11 PROCEDURE — 36415 COLL VENOUS BLD VENIPUNCTURE: CPT | Mod: PO

## 2018-04-11 PROCEDURE — 84443 ASSAY THYROID STIM HORMONE: CPT | Mod: PO

## 2018-04-18 ENCOUNTER — OFFICE VISIT (OUTPATIENT)
Dept: ENDOCRINOLOGY | Facility: CLINIC | Age: 68
End: 2018-04-18
Payer: COMMERCIAL

## 2018-04-18 VITALS
HEART RATE: 71 BPM | DIASTOLIC BLOOD PRESSURE: 78 MMHG | SYSTOLIC BLOOD PRESSURE: 150 MMHG | RESPIRATION RATE: 17 BRPM | BODY MASS INDEX: 44.06 KG/M2 | WEIGHT: 224.44 LBS | HEIGHT: 60 IN

## 2018-04-18 DIAGNOSIS — E11.9 TYPE 2 DIABETES MELLITUS WITHOUT COMPLICATION, WITHOUT LONG-TERM CURRENT USE OF INSULIN: ICD-10-CM

## 2018-04-18 DIAGNOSIS — M85.80 OSTEOPENIA, UNSPECIFIED LOCATION: ICD-10-CM

## 2018-04-18 DIAGNOSIS — E89.0 POSTABLATIVE HYPOTHYROIDISM: Primary | ICD-10-CM

## 2018-04-18 DIAGNOSIS — E55.9 VITAMIN D DEFICIENCY: ICD-10-CM

## 2018-04-18 DIAGNOSIS — N18.30 CKD (CHRONIC KIDNEY DISEASE) STAGE 3, GFR 30-59 ML/MIN: ICD-10-CM

## 2018-04-18 DIAGNOSIS — E66.01 MORBID OBESITY: ICD-10-CM

## 2018-04-18 PROCEDURE — 99999 PR PBB SHADOW E&M-EST. PATIENT-LVL IV: CPT | Mod: PBBFAC,,, | Performed by: INTERNAL MEDICINE

## 2018-04-18 PROCEDURE — 3044F HG A1C LEVEL LT 7.0%: CPT | Mod: CPTII,S$GLB,, | Performed by: INTERNAL MEDICINE

## 2018-04-18 PROCEDURE — 3078F DIAST BP <80 MM HG: CPT | Mod: CPTII,S$GLB,, | Performed by: INTERNAL MEDICINE

## 2018-04-18 PROCEDURE — 99214 OFFICE O/P EST MOD 30 MIN: CPT | Mod: S$GLB,,, | Performed by: INTERNAL MEDICINE

## 2018-04-18 PROCEDURE — 3008F BODY MASS INDEX DOCD: CPT | Mod: CPTII,S$GLB,, | Performed by: INTERNAL MEDICINE

## 2018-04-18 PROCEDURE — 3077F SYST BP >= 140 MM HG: CPT | Mod: CPTII,S$GLB,, | Performed by: INTERNAL MEDICINE

## 2018-04-18 RX ORDER — METFORMIN HYDROCHLORIDE 500 MG/1
500 TABLET ORAL 2 TIMES DAILY WITH MEALS
Qty: 180 TABLET | Refills: 3 | Status: SHIPPED | OUTPATIENT
Start: 2018-04-18 | End: 2019-07-12 | Stop reason: SDUPTHER

## 2018-04-18 NOTE — PROGRESS NOTES
Subjective:      Patient ID: Hunter Cisneros is a 67 y.o. female.    Chief Complaint:  Follow-up      History of Present Illness  F/u visit for T2DM     Postablative Hypothyroidism (secondary to I-131 in 2004 (believed to be for compressive goiter)   She is compliant with thyroid hormone 137 mcg x 6 days/week and taking appropriately.   Denies Fatigue, Palpitations, Diarrhea/constipation  Sometimes heat intolerance     she was diagnosed with T2DM 7/11, with CKD 3  On metformin 500mg bid and invokana 100mg daily and tolerating well   BG range   UTD ophtho     Is morbidly obese:   Lost 20lbs, then since regained 10 lbs   Gym-4-5 days per week; pt started in June 2015  Staying away from fast food. Has decreased portions. Trying to cut back on carbs    CKD 3 Followed by renal   HTN, taking losartan-HCT  HLD, on lipitor 80mg qhs      Vit D def and osteopenia spine and fem neck (BMD in 2017)  Taking 1000 iu daily, Denies fractures       Review of Systems   Constitutional: Negative for unexpected weight change.   Eyes: Negative for visual disturbance.   Cardiovascular: Negative for palpitations.   Gastrointestinal: Negative for abdominal pain.   Endocrine: Positive for heat intolerance.   Skin: Negative for wound.   Neurological: Negative for numbness.   Psychiatric/Behavioral: Negative for sleep disturbance.       Objective:   Physical Exam   Neck: No thyromegaly present.   Cardiovascular: Normal rate.    Pulmonary/Chest: Effort normal.   Abdominal: Soft.   Pale striae     Musculoskeletal: She exhibits no edema.   Neurological:   Normal monofilament and vibratory    Vitals reviewed.      Lab Review:   Lab Results   Component Value Date    HGBA1C 5.8 (H) 09/14/2017     Lab Results   Component Value Date    TSH 0.687 04/11/2018       Assessment:     1. Postablative hypothyroidism     2. Type 2 diabetes mellitus without complication, without long-term current use of insulin  Hemoglobin A1c    Microalbumin/creatinine  urine ratio   3. Osteopenia, unspecified location     4. CKD (chronic kidney disease) stage 3, GFR 30-59 ml/min     5. Morbid obesity     6. Vitamin D deficiency          Plan:     Hunter was seen today for follow-up.    Diagnoses and all orders for this visit:    Postablative hypothyroidism  Cont lt4 137mcg x 6 days/week and follow TSH 6-12 mo     Type 2 diabetes mellitus without complication, without long-term current use of insulin  Repeat labs today, at goal based on reported BG   Reporting invokana is too expensive even though covered by insurance, given paper rx for both jardiance and invokana and will let us know which one is more affordable for her.   -     Hemoglobin A1c; Future  -     Microalbumin/creatinine urine ratio    Osteopenia, unspecified location  Repeat in 2019  Fall prevention   Cont vitamin D     CKD (chronic kidney disease) stage 3, GFR 30-59 ml/min  F/u renal     Morbid obesity  Discussed dietary health choices, cont exercise, on SGLT-2    Vitamin D deficiency  Replete     rtc 1 year   Discussed w Dr Fang

## 2018-04-20 ENCOUNTER — TELEPHONE (OUTPATIENT)
Dept: ENDOCRINOLOGY | Facility: CLINIC | Age: 68
End: 2018-04-20

## 2018-04-20 NOTE — TELEPHONE ENCOUNTER
PA for Jardiance was completed on [email or web address]. Key is [id number]. A response should be sent over within 24 hours.

## 2018-05-06 NOTE — PROGRESS NOTES
I, Emperatriz Fang, have personally taken the history and physical exam and I agree with Dr. Guerra's assessment and plan.

## 2018-05-16 ENCOUNTER — OFFICE VISIT (OUTPATIENT)
Dept: OPTOMETRY | Facility: CLINIC | Age: 68
End: 2018-05-16
Payer: COMMERCIAL

## 2018-05-16 DIAGNOSIS — H40.053 OCULAR HYPERTENSION, BILATERAL: Primary | ICD-10-CM

## 2018-05-16 DIAGNOSIS — H52.03 HYPEROPIA WITH ASTIGMATISM AND PRESBYOPIA, BILATERAL: ICD-10-CM

## 2018-05-16 DIAGNOSIS — H52.4 HYPEROPIA WITH ASTIGMATISM AND PRESBYOPIA, BILATERAL: ICD-10-CM

## 2018-05-16 DIAGNOSIS — H25.13 NUCLEAR SCLEROSIS, BILATERAL: ICD-10-CM

## 2018-05-16 DIAGNOSIS — H52.203 HYPEROPIA WITH ASTIGMATISM AND PRESBYOPIA, BILATERAL: ICD-10-CM

## 2018-05-16 PROCEDURE — 99999 PR PBB SHADOW E&M-EST. PATIENT-LVL II: CPT | Mod: PBBFAC,,, | Performed by: OPTOMETRIST

## 2018-05-16 PROCEDURE — 92014 COMPRE OPH EXAM EST PT 1/>: CPT | Mod: S$GLB,,, | Performed by: OPTOMETRIST

## 2018-05-16 PROCEDURE — 92015 DETERMINE REFRACTIVE STATE: CPT | Mod: S$GLB,,, | Performed by: OPTOMETRIST

## 2018-05-16 NOTE — PATIENT INSTRUCTIONS
Bilateral nuclear sclerosis of lens, consistent with age.  No need for cataract surgery in either eye.    Hyperopia with astigmatism in each eye.   Stable refractive error in each eye, compared to previous refraction.  Better best-corrected VA in the right eye than in the left eye.  Presbyopia consistent with age.  New spectacle lens Rx issued for use as desired.  No compelling need to replace lenses at this time.    Prior diagnosis of bilateral ocular hypertension.  Prior consult(s) with Dr. Sykes.  Using drops for IOP control in both eyes:  Timolol Maleate 0.5% GFS every morning in both eyes, and Latanoprost 0.005% every evening in both eyes.   Suggest repeat HVF test (24-2 EMANUEL Standard).  Ms. Quinonez to call to schedule HVF test, and OCT RNFL thickness analysis, and follow-up visit with me (Dr. Aquino) on the same date.     Continue drops for IOP control in both eyes in the interim.    Repeat refraction in one year.

## 2018-05-16 NOTE — PROGRESS NOTES
HPI     Diabetic Eye Exam    Additional comments: Annual general eye examination and refraction.   No acute ocular/vision problems   Wearing glasses full-time,            Comments   Patient's age: 67 y.o.  Occupation: Housewife  Approximate date of last eye examination:  05/08/2017  Name of last eye doctor seen: Dr Aquino, Dr. Sykes - 08/18/2017 (Dx:   bilateral ocular hypertension)  City/State: [facility]  Wears glasses? Yes     If yes, wears  Full-time or part-time?  Full-time  Present glasses are: Bifocal, SV Distance, SV Reading?  Progressive lenses  Approximate age of present glasses:  1+ years old   Got new glasses following last exam, or subsequently?: Yes   Any problem with VA with glasses?  No  Wears CLs?:  No  Headaches?  No  Eye pain/discomfort?  No                                                                                     Flashes?  No  Floaters?  No  Diplopia/Double vision?  No  Patient's Ocular History:         Any eye surgeries? No         Any eye injury?  No         Any treatment for eye disease?  Ocular Hypertension   Family history of eye disease?    Maternal Uncle + Glaucoma  Significant patient medical history:         1. Diabetes?  Yes, diagnosed 7/2012 - Controlled by diet and meds   (Metformin and Invocana)  LBS - 112 this am                  ----------         If yes, IDDM or NIDDM? NIDDM   2. HBP?  Yes, controlled by medication and diet              3. Other (describe):     ! OTC eyedrops currently using:  None   ! Prescription eye meds currently using:                                 Latanoprost QHS OU                               Timolol 0.5% Gel-forming solution QAM OU   ! Any history of allergy/adverse reaction to any eye meds used   previously?  No    ! Any history of allergy/adverse reaction to eyedrops used during prior   eye exam(s)? No    ! Any history of allergy/adverse reaction to Novacaine or similar meds?   No   ! Any history of allergy/adverse reaction to Epinephrine  "or similar meds?   No    ! Patient okay with use of anesthetic eyedrops to check eye pressure?    Yes        ! Patient okay with use of eyedrops to dilate pupils today?  Yes    !  Allergies/Medications/Medical History/Family History reviewed today?    Yes    PD 69/65  Reading dist = 14.5"                            Last edited by Cesario Aquino, OD on 5/16/2018 11:32 AM. (History)            Assessment /Plan     For exam results, see Encounter Report.    1. Ocular hypertension, bilateral  [place] Visual Field - OU - Extended - Both Eyes    OCT, Optic Nerve - OU - Both Eyes   2. Nuclear sclerosis, bilateral     3. Hyperopia with astigmatism and presbyopia, bilateral                    Bilateral nuclear sclerosis of lens, consistent with age.  No need for cataract surgery in either eye.    Hyperopia with astigmatism in each eye.   Stable refractive error in each eye, compared to previous refraction.  Better best-corrected VA in the right eye than in the left eye.  Presbyopia consistent with age.  New spectacle lens Rx issued for use as desired.  No compelling need to replace lenses at this time.    Prior diagnosis of bilateral ocular hypertension.  Prior consult(s) with Dr. Sykse.  Using drops for IOP control in both eyes:  Timolol Maleate 0.5% GFS every morning in both eyes, and Latanoprost 0.005% every evening in both eyes.   Suggest repeat HVF test (24-2 EMANUEL Standard).  Ms. Quinonez to call to schedule HVF test, and OCT RNFL thickness analysis, and follow-up visit with me (Dr. Aquino) on the same date.     Continue drops for IOP control in both eyes in the interim.    Repeat refraction in one year.     "

## 2018-06-01 RX ORDER — LOSARTAN POTASSIUM AND HYDROCHLOROTHIAZIDE 25; 100 MG/1; MG/1
TABLET ORAL
Qty: 30 TABLET | Refills: 11 | Status: SHIPPED | OUTPATIENT
Start: 2018-06-01 | End: 2019-01-11

## 2018-06-27 ENCOUNTER — OFFICE VISIT (OUTPATIENT)
Dept: OPTOMETRY | Facility: CLINIC | Age: 68
End: 2018-06-27
Payer: COMMERCIAL

## 2018-06-27 ENCOUNTER — CLINICAL SUPPORT (OUTPATIENT)
Dept: OPHTHALMOLOGY | Facility: CLINIC | Age: 68
End: 2018-06-27
Payer: COMMERCIAL

## 2018-06-27 DIAGNOSIS — H40.053 OCULAR HYPERTENSION, BILATERAL: ICD-10-CM

## 2018-06-27 DIAGNOSIS — H25.13 NUCLEAR SCLEROSIS, BILATERAL: ICD-10-CM

## 2018-06-27 DIAGNOSIS — H40.053 OCULAR HYPERTENSION, BILATERAL: Primary | ICD-10-CM

## 2018-06-27 PROCEDURE — 99999 PR PBB SHADOW E&M-EST. PATIENT-LVL II: CPT | Mod: PBBFAC,,, | Performed by: OPTOMETRIST

## 2018-06-27 PROCEDURE — 92083 EXTENDED VISUAL FIELD XM: CPT | Mod: S$GLB,,, | Performed by: OPTOMETRIST

## 2018-06-27 PROCEDURE — 92012 INTRM OPH EXAM EST PATIENT: CPT | Mod: S$GLB,,, | Performed by: OPTOMETRIST

## 2018-06-27 NOTE — PATIENT INSTRUCTIONS
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      Prior diagnosis of ocular hypertension.  Using timolol maleate 0.5% ophthalmic solution every morning in both eyes, and Latanoprost 0.005% ophthalmic solution every evening in both eyes for IOP control.  Intraocular pressure within normal range in both eyes, although high-normal in each eye.  HVF test (24-2 EMANUEL Standard) done today.  Results borderline reliable in the right eye and borderline to poorly reliable in the left eye.  Taking results at face, no evidence of overtly glaucomatous visual field defect in either eye.   Continue Latanoprost 0.005% every evening in both eyes, and continue Timolol Maleate 0.5% every morning in both eyes.  Return for recheck in May, 2019.

## 2018-06-28 NOTE — PROGRESS NOTES
HPI     Concerns About Ocular Health    Additional comments: 1 month f/u            Comments   Patient in for progress check.  Patient is in for a 1 month f/u with HVF/ OCT   Being followed for (diagnosis):   Ocular Hypertension     Date last seen:  05/16/2018    Doctor last seen:  Dr. Aquino     Prescribed eye medications(s) using:  Timolol q OU, Latanoprost OU q pm     OTC eye medication(s) using:  No    Signs/symptoms of condition resolved/better/stable/worse?:  Stable     Allergies/Medications reviewed today?  Yes              Last edited by Rohini Quinonez on 6/27/2018 11:20 AM. (History)            Assessment /Plan     For exam results, see Encounter Report.    1. Ocular hypertension, bilateral     2. Nuclear sclerosis, bilateral                    Prior diagnosis of nuclear sclerosis of lens of both eyes.      Prior diagnosis of ocular hypertension.  Using timolol maleate 0.5% ophthalmic solution every morning in both eyes, and Latanoprost 0.005% ophthalmic solution every evening in both eyes for IOP control.  Intraocular pressure within normal range in both eyes, although high-normal in each eye.  HVF test (24-2 EMANUEL Standard) done today.  Results borderline reliable in the right eye and borderline to poorly reliable in the left eye.  Taking results at face, no evidence of overtly glaucomatous visual field defect in either eye.   Continue Latanoprost 0.005% every evening in both eyes, and continue Timolol Maleate 0.5% every morning in both eyes.  Return for recheck in May, 2019.

## 2018-07-04 ENCOUNTER — HOSPITAL ENCOUNTER (EMERGENCY)
Facility: HOSPITAL | Age: 68
Discharge: HOME | End: 2018-07-04
Attending: SURGERY
Payer: COMMERCIAL

## 2018-07-04 VITALS
TEMPERATURE: 98 F | RESPIRATION RATE: 20 BRPM | HEART RATE: 75 BPM | BODY MASS INDEX: 44.17 KG/M2 | HEIGHT: 60 IN | DIASTOLIC BLOOD PRESSURE: 76 MMHG | SYSTOLIC BLOOD PRESSURE: 167 MMHG | OXYGEN SATURATION: 98 % | WEIGHT: 225 LBS

## 2018-07-04 DIAGNOSIS — M54.31 BACK PAIN WITH RIGHT-SIDED SCIATICA: Primary | ICD-10-CM

## 2018-07-04 PROCEDURE — 96372 THER/PROPH/DIAG INJ SC/IM: CPT | Mod: 59

## 2018-07-04 PROCEDURE — 25000003 PHARM REV CODE 250: Performed by: SURGERY

## 2018-07-04 PROCEDURE — 63600175 PHARM REV CODE 636 W HCPCS: Performed by: SURGERY

## 2018-07-04 PROCEDURE — 99283 EMERGENCY DEPT VISIT LOW MDM: CPT

## 2018-07-04 RX ORDER — BETAMETHASONE SODIUM PHOSPHATE AND BETAMETHASONE ACETATE 3; 3 MG/ML; MG/ML
6 INJECTION, SUSPENSION INTRA-ARTICULAR; INTRALESIONAL; INTRAMUSCULAR; SOFT TISSUE
Status: COMPLETED | OUTPATIENT
Start: 2018-07-04 | End: 2018-07-04

## 2018-07-04 RX ORDER — MORPHINE SULFATE 4 MG/ML
4 INJECTION, SOLUTION INTRAMUSCULAR; INTRAVENOUS
Status: COMPLETED | OUTPATIENT
Start: 2018-07-04 | End: 2018-07-04

## 2018-07-04 RX ORDER — HYDROCODONE BITARTRATE AND ACETAMINOPHEN 5; 325 MG/1; MG/1
1 TABLET ORAL EVERY 6 HOURS PRN
Qty: 10 TABLET | Refills: 0 | Status: SHIPPED | OUTPATIENT
Start: 2018-07-04 | End: 2018-11-15

## 2018-07-04 RX ORDER — LORAZEPAM 0.5 MG/1
0.5 TABLET ORAL
Status: COMPLETED | OUTPATIENT
Start: 2018-07-04 | End: 2018-07-04

## 2018-07-04 RX ADMIN — MORPHINE SULFATE 4 MG: 4 INJECTION INTRAVENOUS at 11:07

## 2018-07-04 RX ADMIN — BETAMETHASONE SODIUM PHOSPHATE AND BETAMETHASONE ACETATE 6 MG: 3; 3 INJECTION, SUSPENSION INTRA-ARTICULAR; INTRALESIONAL; INTRAMUSCULAR at 11:07

## 2018-07-04 RX ADMIN — LORAZEPAM 0.5 MG: 0.5 TABLET ORAL at 11:07

## 2018-07-04 NOTE — ED PROVIDER NOTES
Encounter Date: 2018       History     Chief Complaint   Patient presents with    Back Pain     Pt c/ right sided lower back pain radiating down right leg since last night.  Pt denies any known injury, states has had pain to same area in past.      Patient complains of right-sided lower back pain that radiates down the right side of her buttock to her right hip down the outside of her right thigh.  Pain started last night and she could not sleep She denies trauma or injury to this area.  She's had previous episode of this similar symptoms before but it went away after for 5 days of rest.  The pain she describes today is aggravated by certain positions and motion      The history is provided by the patient.   Back Pain    The current episode started yesterday. The problem occurs intermittently. The problem has been unchanged. The pain is associated with no known injury. The pain is present in the lumbar spine. The quality of the pain is described as shooting. The pain radiates to the right thigh. The pain is at a severity of 10/10. The symptoms are aggravated by bending and certain positions.     Review of patient's allergies indicates:   Allergen Reactions    Lisinopril      cough     Past Medical History:   Diagnosis Date    Cataract     CKD (chronic kidney disease) stage 3, GFR 30-59 ml/min 3/21/2014    Diabetes mellitus type II     GERD (gastroesophageal reflux disease)     Glaucoma (increased eye pressure)     HLD (hyperlipidemia)     HTN (hypertension)     Hypothyroidism     Morbid obesity 3/21/2014    Vitamin D deficiency 3/21/2014     Past Surgical History:   Procedure Laterality Date     SECTION, LOW TRANSVERSE       Family History   Problem Relation Age of Onset    Coronary artery disease Mother         premature, 57    Cancer Sister     Breast cancer Sister     No Known Problems Father     Diabetes Unknown     Hypertension Unknown     Thyroid disease Unknown     Glaucoma  Maternal Uncle     Diabetes Maternal Uncle     No Known Problems Brother     No Known Problems Maternal Aunt     No Known Problems Paternal Aunt     No Known Problems Paternal Uncle     No Known Problems Maternal Grandmother     No Known Problems Maternal Grandfather     No Known Problems Paternal Grandmother     No Known Problems Paternal Grandfather     Amblyopia Neg Hx     Blindness Neg Hx     Cataracts Neg Hx     Macular degeneration Neg Hx     Retinal detachment Neg Hx     Strabismus Neg Hx     Stroke Neg Hx      Social History   Substance Use Topics    Smoking status: Former Smoker     Types: Cigarettes    Smokeless tobacco: Not on file      Comment: occasionally    Alcohol use No     Review of Systems   Constitutional: Negative.    HENT: Negative.    Eyes: Negative.    Respiratory: Negative.    Cardiovascular: Negative.    Gastrointestinal: Negative.    Endocrine: Negative.    Genitourinary: Negative.    Musculoskeletal: Positive for back pain.   Skin: Negative.    Allergic/Immunologic: Negative.    Neurological: Negative.    Hematological: Negative.    Psychiatric/Behavioral: Negative.    All other systems reviewed and are negative.      Physical Exam     Initial Vitals [07/04/18 1122]   BP Pulse Resp Temp SpO2   (!) 167/76 75 20 97.9 °F (36.6 °C) 98 %      MAP       --         Physical Exam    Nursing note and vitals reviewed.  Constitutional: She appears well-developed and well-nourished.   HENT:   Head: Normocephalic.   Eyes: Conjunctivae are normal.   Neck: Normal range of motion. Neck supple.   Cardiovascular: Normal rate, regular rhythm, normal heart sounds and intact distal pulses.   Pulmonary/Chest: Breath sounds normal.   Abdominal: Soft.   Musculoskeletal:        Back:    right paraspinal tenderness with pain over the right sciatic notch with radiation down the right side of buttock   Neurological: She is alert and oriented to person, place, and time. No sensory deficit.   Skin:  Skin is warm and dry. Capillary refill takes less than 2 seconds.   Psychiatric: She has a normal mood and affect.         ED Course   Procedures  Labs Reviewed - No data to display       Imaging Results    None          Medical Decision Making:   Initial Assessment:   Back pain with right-sided sciatica.  No injury.  Patient had previous episode  ED Management:  Neurologic exam is normal Patient was treated with steroids and analgesics with relief in the ED  recommend follow-up with primary M.D. for imaging if further pain develops                      Clinical Impression:   The encounter diagnosis was Back pain with right-sided sciatica.                             LEON Ghotra III, MD  07/04/18 5593

## 2018-10-03 ENCOUNTER — TELEPHONE (OUTPATIENT)
Dept: FAMILY MEDICINE | Facility: CLINIC | Age: 68
End: 2018-10-03

## 2018-10-03 NOTE — TELEPHONE ENCOUNTER
----- Message from Cesilia Sindy sent at 10/3/2018  2:24 PM CDT -----  Contact: self, [phone]  Patient requests to schedule her annual appointment. States it needs to be in November. Please advise.

## 2018-11-15 ENCOUNTER — OFFICE VISIT (OUTPATIENT)
Dept: FAMILY MEDICINE | Facility: CLINIC | Age: 68
End: 2018-11-15
Payer: COMMERCIAL

## 2018-11-15 VITALS
WEIGHT: 229.25 LBS | SYSTOLIC BLOOD PRESSURE: 146 MMHG | DIASTOLIC BLOOD PRESSURE: 60 MMHG | BODY MASS INDEX: 45.01 KG/M2 | TEMPERATURE: 98 F | OXYGEN SATURATION: 96 % | HEART RATE: 72 BPM | HEIGHT: 60 IN

## 2018-11-15 DIAGNOSIS — G57.13 MERALGIA PARESTHETICA OF BOTH LOWER EXTREMITIES: ICD-10-CM

## 2018-11-15 DIAGNOSIS — I10 ESSENTIAL HYPERTENSION: ICD-10-CM

## 2018-11-15 DIAGNOSIS — Z12.11 COLON CANCER SCREENING: ICD-10-CM

## 2018-11-15 DIAGNOSIS — E03.9 HYPOTHYROIDISM, UNSPECIFIED TYPE: ICD-10-CM

## 2018-11-15 DIAGNOSIS — N18.30 DIABETES MELLITUS WITH STAGE 3 CHRONIC KIDNEY DISEASE: ICD-10-CM

## 2018-11-15 DIAGNOSIS — E78.5 HYPERLIPIDEMIA, UNSPECIFIED HYPERLIPIDEMIA TYPE: ICD-10-CM

## 2018-11-15 DIAGNOSIS — N18.30 CKD (CHRONIC KIDNEY DISEASE) STAGE 3, GFR 30-59 ML/MIN: ICD-10-CM

## 2018-11-15 DIAGNOSIS — E11.22 DIABETES MELLITUS WITH STAGE 3 CHRONIC KIDNEY DISEASE: ICD-10-CM

## 2018-11-15 DIAGNOSIS — Z00.00 ROUTINE GENERAL MEDICAL EXAMINATION AT A HEALTH CARE FACILITY: Primary | ICD-10-CM

## 2018-11-15 DIAGNOSIS — Z12.31 SCREENING MAMMOGRAM, ENCOUNTER FOR: ICD-10-CM

## 2018-11-15 PROCEDURE — 3077F SYST BP >= 140 MM HG: CPT | Mod: CPTII,S$GLB,, | Performed by: FAMILY MEDICINE

## 2018-11-15 PROCEDURE — 3044F HG A1C LEVEL LT 7.0%: CPT | Mod: CPTII,S$GLB,, | Performed by: FAMILY MEDICINE

## 2018-11-15 PROCEDURE — 99999 PR PBB SHADOW E&M-EST. PATIENT-LVL V: CPT | Mod: PBBFAC,,, | Performed by: FAMILY MEDICINE

## 2018-11-15 PROCEDURE — 99397 PER PM REEVAL EST PAT 65+ YR: CPT | Mod: 25,S$GLB,, | Performed by: FAMILY MEDICINE

## 2018-11-15 PROCEDURE — 90471 IMMUNIZATION ADMIN: CPT | Mod: S$GLB,,, | Performed by: FAMILY MEDICINE

## 2018-11-15 PROCEDURE — 90732 PPSV23 VACC 2 YRS+ SUBQ/IM: CPT | Mod: S$GLB,,, | Performed by: FAMILY MEDICINE

## 2018-11-15 PROCEDURE — 3078F DIAST BP <80 MM HG: CPT | Mod: CPTII,S$GLB,, | Performed by: FAMILY MEDICINE

## 2018-11-15 RX ORDER — GABAPENTIN 100 MG/1
100 CAPSULE ORAL 3 TIMES DAILY
Qty: 90 CAPSULE | Refills: 11 | Status: SHIPPED | OUTPATIENT
Start: 2018-11-15 | End: 2020-11-10 | Stop reason: SDUPTHER

## 2018-11-15 RX ORDER — ROSUVASTATIN CALCIUM 40 MG/1
40 TABLET, COATED ORAL NIGHTLY
Qty: 90 TABLET | Refills: 3 | Status: SHIPPED | OUTPATIENT
Start: 2018-11-15 | End: 2019-12-27

## 2018-11-15 RX ORDER — VIT C/E/ZN/COPPR/LUTEIN/ZEAXAN 250MG-90MG
1000 CAPSULE ORAL DAILY
COMMUNITY

## 2018-11-15 RX ORDER — AMLODIPINE BESYLATE 5 MG/1
5 TABLET ORAL DAILY
Qty: 30 TABLET | Refills: 11 | Status: SHIPPED | OUTPATIENT
Start: 2018-11-15 | End: 2020-04-03

## 2018-11-15 NOTE — PATIENT INSTRUCTIONS
Meralgia paresthetica  Overview  Meralgia paresthetica is a condition characterized by tingling, numbness and burning pain in your outer thigh. The cause of meralgia paresthetica is compression of the nerve that supplies sensation to the skin surface of your thigh.    Tight clothing, obesity or weight gain, and pregnancy are common causes of meralgia paresthetica. However, meralgia paresthetica can also be due to local trauma or a disease, such as diabetes.    In most cases, you can relieve meralgia paresthetica with conservative measures, such as wearing looser clothing. In severe cases, treatment may include medications to relieve discomfort or, rarely, surgery.    Symptoms  Pressure on the lateral femoral cutaneous nerve, which supplies sensation to your upper thigh, might cause these symptoms of meralgia paresthetica:    Tingling and numbness in the outer (lateral) part of your thigh  Burning pain on the surface of the outer part of your thigh  These symptoms commonly occur on one side of your body and might intensify after walking or standing.    When to see your doctor    See your doctor if you have symptoms of meralgia paresthetica.    Causes  Meralgia paresthetica occurs when the lateral femoral cutaneous nerve -- which supplies sensation to the surface of your outer thigh -- becomes compressed, or pinched. The lateral femoral cutaneous nerve is purely a sensory nerve and doesn't affect your ability to use your leg muscles.    In most people, this nerve passes through the groin to the upper thigh without trouble. But in meralgia paresthetica, the lateral femoral cutaneous nerve becomes trapped -- often under the inguinal ligament, which runs along your groin from your abdomen to your upper thigh.    Common causes of this compression include any condition that increases pressure on the groin, including:    Tight clothing, such as belts, corsets and tight pants  Obesity or weight gain  Wearing a heavy tool  belt  Pregnancy  Scar tissue near the inguinal ligament due to injury or past surgery  Nerve injury, which can be due to diabetes or seat belt injury after a motor vehicle accident, for example, also can cause meralgia paresthetica.    Risk factors  The following might increase your risk of meralgia paresthetica:    Extra weight. Being overweight or obese can increase the pressure on your lateral femoral cutaneous nerve.  Pregnancy. A growing belly puts added pressure on your groin, through which the lateral femoral cutaneous nerve passes.  Diabetes. Diabetes-related nerve injury can lead to meralgia paresthetica.  Age. People between the ages of 30 and 60 are at a higher risk.    Treatment  For most people, the symptoms of meralgia paresthetica ease in a few months. Treatment focuses on relieving nerve compression.    Conservative measures  Conservative measures include:    Wearing looser clothing  Losing excess weight  Taking OTC pain relievers such as acetaminophen (Tylenol, others)    Medications  If symptoms persist for more than two months or your pain is severe, treatment might include:    Corticosteroid injections. Injections can reduce inflammation and temporarily relieve pain. Possible side effects include joint infection, nerve damage, pain and whitening of skin around the injection site.  Tricyclic antidepressants. These medications might relieve your pain. Side effects include drowsiness, dry mouth, constipation and impaired sexual functioning.    Gabapentin (Gralise, Neurontin), phenytoin (Dilantin) or pregabalin (Lyrica). These anti-seizure medications might help lessen your painful symptoms. Side effects include constipation, nausea, dizziness, drowsiness and lightheadedness.

## 2018-11-15 NOTE — PROGRESS NOTES
(Portions of this note were dictated using voice recognition software and may contain dictation related errors in spelling/grammar/syntax not found on text review)    CC:   Chief Complaint   Patient presents with    Annual Exam     foot exam, colonoscopy       HPI: 68 y.o. female here for annual exam    Hypertension on losartan /25+ metoprolol 25 mg daily.  Was exercising but had stopped when she started getting some leg burning symptoms, see below.  Does admit to indiscretion in diet.    weight had come down overall but has gained some weight over the last few months, 229 today, was 225 in July, for 213 in November of last year.  Compliant with therapy     Hyperlipidemia, Crestor 40 mg daily.  Looks like medication was prescribed over a year ago, she is not certain if she has been taking this consistently, okay with getting this represcribed     Diabetes, controlled on metformin 500 b.i.d. and invokana. Follows up with endo.   Last A1c as below.  Has chronic kidney disease stage 3.  However, insurance informed her that they would not pay for Invokana any longer.     Hypothyroidism on Synthroid, 137 but given suppressed TSH was advised to decrease this to 6 days a week  (was on 125 in the past but TSH was too high)    Burning both outer thighs, worse in last couple of months.  Mainly when she is walking but improved when she is sitting still or lying down.  Occasionally she might get some night cramps in her calves or feet but this is independent of the above burning symptoms, usually when she is at rest.       Past Medical History:   Diagnosis Date    Cataract     CKD (chronic kidney disease) stage 3, GFR 30-59 ml/min 3/21/2014    Diabetes mellitus type II     GERD (gastroesophageal reflux disease)     Glaucoma (increased eye pressure)     HLD (hyperlipidemia)     HTN (hypertension)     Hypothyroidism     Morbid obesity 3/21/2014    Vitamin D deficiency 3/21/2014       Past Surgical History:    Procedure Laterality Date     SECTION, LOW TRANSVERSE         Family History   Problem Relation Age of Onset    Coronary artery disease Mother         premature, 57    Cancer Sister     Breast cancer Sister     No Known Problems Father     Diabetes Unknown     Hypertension Unknown     Thyroid disease Unknown     Glaucoma Maternal Uncle     Diabetes Maternal Uncle     No Known Problems Brother     No Known Problems Maternal Aunt     No Known Problems Paternal Aunt     No Known Problems Paternal Uncle     No Known Problems Maternal Grandmother     No Known Problems Maternal Grandfather     No Known Problems Paternal Grandmother     No Known Problems Paternal Grandfather     Amblyopia Neg Hx     Blindness Neg Hx     Cataracts Neg Hx     Macular degeneration Neg Hx     Retinal detachment Neg Hx     Strabismus Neg Hx     Stroke Neg Hx        Social History     Socioeconomic History    Marital status:      Spouse name: Not on file    Number of children: Not on file    Years of education: Not on file    Highest education level: Not on file   Social Needs    Financial resource strain: Not on file    Food insecurity - worry: Not on file    Food insecurity - inability: Not on file    Transportation needs - medical: Not on file    Transportation needs - non-medical: Not on file   Occupational History    Not on file   Tobacco Use    Smoking status: Former Smoker     Types: Cigarettes    Tobacco comment: occasionally   Substance and Sexual Activity    Alcohol use: No    Drug use: No    Sexual activity: Not on file   Other Topics Concern    Not on file   Social History Narrative    Not on file       Lab Results   Component Value Date    WBC 10.18 2016    HGB 14.5 2016    HCT 43.8 2016     2016    CHOL 98 (L) 2017    TRIG 120 2017    HDL 28 (L) 2017    ALT 25 2017    AST 22 2017     2017    K 4.0  06/16/2017     06/16/2017    CREATININE 1.19 06/16/2017    CALCIUM 10.0 06/16/2017    BUN 23 (H) 06/16/2017    CO2 27 06/16/2017    TSH 0.687 04/11/2018    INR 1.1 01/25/2005    HGBA1C 5.7 (H) 04/18/2018    LDLCALC 46.0 (L) 06/16/2017     (H) 06/16/2017                     Vital signs reviewed  PE:   APPEARANCE: Well nourished, well developed, in no acute distress.    HEAD: Normocephalic, atraumatic.  EYES: PERRL. EOMI.   Conjunctivae noninjected.  EARS: TM's intact. Light reflex normal. No retraction or perforation.    NOSE: Mucosa pink. Airway clear.  MOUTH & THROAT: No tonsillar enlargement. No pharyngeal erythema or exudate.   NECK: Supple with no cervical lymphadenopathy.  No carotid bruits.  No thyromegaly  CHEST: Good inspiratory effort. Lungs clear to auscultation with no wheezes or crackles.  CARDIOVASCULAR: Normal S1, S2. No rubs, murmurs, or gallops.  ABDOMEN: Bowel sounds normal. Not distended. Soft. No tenderness or masses. No organomegaly.  EXTREMITIES: No edema, cyanosis, or clubbing.  Does have some pain over bilateral greater trochanters and also along lateral thigh palpation  DIABETIC FOOT EXAM: Protective Sensation (w/ 10 gram monofilament):  Right: Intact  Left: Intact    Visual Inspection:  Normal -  Bilateral    Pedal Pulses:   Right: Present  Left: Diminshed    Posterior tibialis:   Right:Present  Left: Diminshed        IMPRESSION  1. Routine general medical examination at a health care facility    2. Hypothyroidism, unspecified type    3. Essential hypertension    4. Hyperlipidemia, unspecified hyperlipidemia type    5. CKD (chronic kidney disease) stage 3, GFR 30-59 ml/min    6. Meralgia paresthetica of both lower extremities    7. Colon cancer screening    8. Screening mammogram, encounter for    9. Diabetes mellitus with stage 3 chronic kidney disease        PLAN  Orders Placed This Encounter   Procedures    Mammo Digital Screening Bilat    (In Office Administered)  Pneumococcal Polysaccharide Vaccine (23 Valent) (SQ/IM)    Fecal Immunochemical Test (iFOBT)    CBC auto differential    Comprehensive metabolic panel    Lipid panel    TSH    Hemoglobin A1c     Diabetes health maintenance:  -- advise regular and consistent glucose monitoring and medication compliance.  -- advise daily foot checks  -- advise yearly ophthalmologic exams  -- advise adequate dietary and exercise modification  -- advise regular dental visits  -- advise daily low-dose aspirin use if patient is not on other anticoagulants and there are no other contraindications.  -- Medication management:  She will follow up with endocrinology as scheduled.  Since insurance is not covering Invokana anymore, she could consider options like increasing metformin to 1000 b.i.d. and if with elevated A1c on monotherapy with metformin could consider trial of alternative SGLT 2 inhibitor like Jardiance, other generic antidiabetic at on medication like glimepiride.     Dyslipidemia:  Refilled Crestor    Hypothyroidism:  Continue Synthroid as recommended by her endocrinologist.  Reassess labs as below    Leg pain bilaterally, distribution strongly points to meralgia paresthetica especially given her recent weight gain.  No shooting pains all the way down her leg consistent with lumbar radiculopathy symptoms.  She does get some separate nocturnal cramps in her calves and feet, will check labs as below but also hopefully with increasing exercise and weight loss should help.  We discussed conservative measures like weight loss and wearing loose fitting clothing for her meralgia paresthetica.  She would like to try some medication management as well.  Would advise against ibuprofen given her kidney disease and blood pressure issues.  Will try gabapentin 100 mg at night, titrated potentially up to t.i.d. if needed    Hypertension:  Continue Hyzaar 100/25.  Stop metoprolol 25 mg daily.  Start amlodipine 5 mg  daily.            HEALTH SCREENINGS  Immunization:  Tdap in 2015   Pneumovax : 9/24/12, repeat needed  Flu: utd  Pcv:  09/22/2017  Zvx: UTD  Mammogram 11/15/2017, rpt ordered     Pap:2016     Colonoscopy 2008. Multiple small hyperplastic polyps removed. Repeat in 7-10 years, rpt screening due: FIT ordered     DEXA scan:  11/15/2017:  L-spine T-score -1.4.  Left femoral neck T-score -1.7

## 2018-11-16 ENCOUNTER — LAB VISIT (OUTPATIENT)
Dept: LAB | Facility: HOSPITAL | Age: 68
End: 2018-11-16
Attending: FAMILY MEDICINE
Payer: COMMERCIAL

## 2018-11-16 DIAGNOSIS — Z12.31 SCREENING MAMMOGRAM, ENCOUNTER FOR: ICD-10-CM

## 2018-11-16 DIAGNOSIS — I10 ESSENTIAL HYPERTENSION: ICD-10-CM

## 2018-11-16 DIAGNOSIS — E03.9 HYPOTHYROIDISM, UNSPECIFIED TYPE: ICD-10-CM

## 2018-11-16 DIAGNOSIS — Z00.00 ROUTINE GENERAL MEDICAL EXAMINATION AT A HEALTH CARE FACILITY: ICD-10-CM

## 2018-11-16 DIAGNOSIS — N18.30 CKD (CHRONIC KIDNEY DISEASE) STAGE 3, GFR 30-59 ML/MIN: ICD-10-CM

## 2018-11-16 DIAGNOSIS — E78.5 HYPERLIPIDEMIA, UNSPECIFIED HYPERLIPIDEMIA TYPE: ICD-10-CM

## 2018-11-16 DIAGNOSIS — Z12.11 COLON CANCER SCREENING: ICD-10-CM

## 2018-11-16 DIAGNOSIS — G57.13 MERALGIA PARESTHETICA OF BOTH LOWER EXTREMITIES: ICD-10-CM

## 2018-11-16 LAB
ALBUMIN SERPL BCP-MCNC: 4.1 G/DL
ALP SERPL-CCNC: 80 U/L
ALT SERPL W/O P-5'-P-CCNC: 18 U/L
ANION GAP SERPL CALC-SCNC: 6 MMOL/L
AST SERPL-CCNC: 27 U/L
BASOPHILS # BLD AUTO: 0.02 K/UL
BASOPHILS NFR BLD: 0.2 %
BILIRUB SERPL-MCNC: 0.5 MG/DL
BUN SERPL-MCNC: 27 MG/DL
CALCIUM SERPL-MCNC: 9.9 MG/DL
CHLORIDE SERPL-SCNC: 103 MMOL/L
CHOLEST SERPL-MCNC: 122 MG/DL
CHOLEST/HDLC SERPL: 3.6 {RATIO}
CO2 SERPL-SCNC: 29 MMOL/L
CREAT SERPL-MCNC: 1.26 MG/DL
DIFFERENTIAL METHOD: ABNORMAL
EOSINOPHIL # BLD AUTO: 0.3 K/UL
EOSINOPHIL NFR BLD: 2.8 %
ERYTHROCYTE [DISTWIDTH] IN BLOOD BY AUTOMATED COUNT: 14.4 %
EST. GFR  (AFRICAN AMERICAN): 51.3 ML/MIN/1.73 M^2
EST. GFR  (NON AFRICAN AMERICAN): 44.5 ML/MIN/1.73 M^2
ESTIMATED AVG GLUCOSE: 120 MG/DL
GLUCOSE SERPL-MCNC: 115 MG/DL
HBA1C MFR BLD HPLC: 5.8 %
HCT VFR BLD AUTO: 43.2 %
HDLC SERPL-MCNC: 34 MG/DL
HDLC SERPL: 27.9 %
HGB BLD-MCNC: 13.6 G/DL
LDLC SERPL CALC-MCNC: 67.4 MG/DL
LYMPHOCYTES # BLD AUTO: 3.3 K/UL
LYMPHOCYTES NFR BLD: 33.4 %
MCH RBC QN AUTO: 28.1 PG
MCHC RBC AUTO-ENTMCNC: 31.5 G/DL
MCV RBC AUTO: 89 FL
MONOCYTES # BLD AUTO: 0.9 K/UL
MONOCYTES NFR BLD: 8.9 %
NEUTROPHILS # BLD AUTO: 5.4 K/UL
NEUTROPHILS NFR BLD: 54.5 %
NONHDLC SERPL-MCNC: 88 MG/DL
PLATELET # BLD AUTO: 146 K/UL
PMV BLD AUTO: 12.4 FL
POTASSIUM SERPL-SCNC: 4.5 MMOL/L
PROT SERPL-MCNC: 7.7 G/DL
RBC # BLD AUTO: 4.84 M/UL
SODIUM SERPL-SCNC: 138 MMOL/L
TRIGL SERPL-MCNC: 103 MG/DL
TSH SERPL DL<=0.005 MIU/L-ACNC: 1.75 UIU/ML
WBC # BLD AUTO: 9.98 K/UL

## 2018-11-16 PROCEDURE — 84443 ASSAY THYROID STIM HORMONE: CPT | Mod: PO

## 2018-11-16 PROCEDURE — 80053 COMPREHEN METABOLIC PANEL: CPT | Mod: PO

## 2018-11-16 PROCEDURE — 36415 COLL VENOUS BLD VENIPUNCTURE: CPT | Mod: PO

## 2018-11-16 PROCEDURE — 85025 COMPLETE CBC W/AUTO DIFF WBC: CPT | Mod: PO

## 2018-11-16 PROCEDURE — 80061 LIPID PANEL: CPT

## 2018-11-16 PROCEDURE — 83036 HEMOGLOBIN GLYCOSYLATED A1C: CPT

## 2018-11-19 ENCOUNTER — TELEPHONE (OUTPATIENT)
Dept: ENDOCRINOLOGY | Facility: CLINIC | Age: 68
End: 2018-11-19

## 2018-11-19 NOTE — TELEPHONE ENCOUNTER
----- Message from Tonia Grier sent at 11/16/2018  3:24 PM CST -----  Contact: Self  .Needs Advice    Reason for call: Her insurance will no longer cover - canagliflozin (INVOKANA) 100 mg Tab, as of 1/2019. Is asking if can prescribe her an alternative approve through her insurance.        Communication Preference: [phone]    Additional Information: Pt will be needing her refill around 12/18, @ [facility]

## 2018-11-20 ENCOUNTER — LAB VISIT (OUTPATIENT)
Dept: LAB | Facility: HOSPITAL | Age: 68
End: 2018-11-20
Attending: FAMILY MEDICINE
Payer: COMMERCIAL

## 2018-11-20 DIAGNOSIS — Z12.11 COLON CANCER SCREENING: ICD-10-CM

## 2018-11-20 LAB — HEMOCCULT STL QL IA: NEGATIVE

## 2018-11-20 PROCEDURE — 82274 ASSAY TEST FOR BLOOD FECAL: CPT

## 2018-11-27 ENCOUNTER — HOSPITAL ENCOUNTER (OUTPATIENT)
Dept: RADIOLOGY | Facility: HOSPITAL | Age: 68
Discharge: HOME | End: 2018-11-27
Attending: FAMILY MEDICINE
Payer: COMMERCIAL

## 2018-11-27 DIAGNOSIS — Z12.11 COLON CANCER SCREENING: ICD-10-CM

## 2018-11-27 DIAGNOSIS — I10 ESSENTIAL HYPERTENSION: ICD-10-CM

## 2018-11-27 DIAGNOSIS — E03.9 HYPOTHYROIDISM, UNSPECIFIED TYPE: ICD-10-CM

## 2018-11-27 DIAGNOSIS — G57.13 MERALGIA PARESTHETICA OF BOTH LOWER EXTREMITIES: ICD-10-CM

## 2018-11-27 DIAGNOSIS — E78.5 HYPERLIPIDEMIA, UNSPECIFIED HYPERLIPIDEMIA TYPE: ICD-10-CM

## 2018-11-27 DIAGNOSIS — Z00.00 ROUTINE GENERAL MEDICAL EXAMINATION AT A HEALTH CARE FACILITY: ICD-10-CM

## 2018-11-27 DIAGNOSIS — Z12.31 SCREENING MAMMOGRAM, ENCOUNTER FOR: ICD-10-CM

## 2018-11-27 DIAGNOSIS — N18.30 CKD (CHRONIC KIDNEY DISEASE) STAGE 3, GFR 30-59 ML/MIN: ICD-10-CM

## 2018-11-27 PROCEDURE — 77063 BREAST TOMOSYNTHESIS BI: CPT | Mod: TC,PO

## 2018-11-27 PROCEDURE — 77067 SCR MAMMO BI INCL CAD: CPT | Mod: TC,PO

## 2018-12-06 RX ORDER — LEVOTHYROXINE SODIUM 137 UG/1
TABLET ORAL
Qty: 90 TABLET | Refills: 3 | Status: SHIPPED | OUTPATIENT
Start: 2018-12-06 | End: 2019-12-29

## 2018-12-31 ENCOUNTER — OFFICE VISIT (OUTPATIENT)
Dept: FAMILY MEDICINE | Facility: CLINIC | Age: 68
End: 2018-12-31
Payer: COMMERCIAL

## 2018-12-31 VITALS
WEIGHT: 226.19 LBS | DIASTOLIC BLOOD PRESSURE: 64 MMHG | HEIGHT: 61 IN | SYSTOLIC BLOOD PRESSURE: 134 MMHG | TEMPERATURE: 98 F | BODY MASS INDEX: 42.71 KG/M2 | HEART RATE: 69 BPM | OXYGEN SATURATION: 99 %

## 2018-12-31 DIAGNOSIS — I10 HYPERTENSION, UNSPECIFIED TYPE: Primary | ICD-10-CM

## 2018-12-31 PROCEDURE — 3075F SYST BP GE 130 - 139MM HG: CPT | Mod: CPTII,S$GLB,, | Performed by: FAMILY MEDICINE

## 2018-12-31 PROCEDURE — 1101F PT FALLS ASSESS-DOCD LE1/YR: CPT | Mod: CPTII,S$GLB,, | Performed by: FAMILY MEDICINE

## 2018-12-31 PROCEDURE — 3078F DIAST BP <80 MM HG: CPT | Mod: CPTII,S$GLB,, | Performed by: FAMILY MEDICINE

## 2018-12-31 PROCEDURE — 99999 PR PBB SHADOW E&M-EST. PATIENT-LVL IV: CPT | Mod: PBBFAC,,, | Performed by: FAMILY MEDICINE

## 2018-12-31 PROCEDURE — 99213 OFFICE O/P EST LOW 20 MIN: CPT | Mod: S$GLB,,, | Performed by: FAMILY MEDICINE

## 2018-12-31 NOTE — PROGRESS NOTES
(Portions of this note were dictated using voice recognition software and may contain dictation related errors in spelling/grammar/syntax not found on text review)    CC:   Chief Complaint   Patient presents with    Follow-up       HPI: 68 y.o. female Seen for annual exam in November.  Blood pressure was high.  Was on losartan /20 5+ metoprolol 25 mg daily.  Stop metoprolol and start amlodipine 5 mg daily instead.  Here for recheck.  Blood pressure doing much better.  No side effects of amlodipine.    Past Medical History:   Diagnosis Date    Cataract     CKD (chronic kidney disease) stage 3, GFR 30-59 ml/min 3/21/2014    Diabetes mellitus type II     GERD (gastroesophageal reflux disease)     Glaucoma (increased eye pressure)     HLD (hyperlipidemia)     HTN (hypertension)     Hypothyroidism     Morbid obesity 3/21/2014    Vitamin D deficiency 3/21/2014       Past Surgical History:   Procedure Laterality Date     SECTION, LOW TRANSVERSE         Family History   Problem Relation Age of Onset    Coronary artery disease Mother         premature, 57    Cancer Sister     Breast cancer Sister     No Known Problems Father     Diabetes Unknown     Hypertension Unknown     Thyroid disease Unknown     Glaucoma Maternal Uncle     Diabetes Maternal Uncle     No Known Problems Brother     No Known Problems Maternal Aunt     No Known Problems Paternal Aunt     No Known Problems Paternal Uncle     No Known Problems Maternal Grandmother     No Known Problems Maternal Grandfather     No Known Problems Paternal Grandmother     No Known Problems Paternal Grandfather     Amblyopia Neg Hx     Blindness Neg Hx     Cataracts Neg Hx     Macular degeneration Neg Hx     Retinal detachment Neg Hx     Strabismus Neg Hx     Stroke Neg Hx        Social History     Socioeconomic History    Marital status:      Spouse name: Not on file    Number of children: Not on file    Years of  education: Not on file    Highest education level: Not on file   Social Needs    Financial resource strain: Not on file    Food insecurity - worry: Not on file    Food insecurity - inability: Not on file    Transportation needs - medical: Not on file    Transportation needs - non-medical: Not on file   Occupational History    Not on file   Tobacco Use    Smoking status: Former Smoker     Types: Cigarettes    Tobacco comment: occasionally   Substance and Sexual Activity    Alcohol use: No    Drug use: No    Sexual activity: Not on file   Other Topics Concern    Not on file   Social History Narrative    Not on file     Lab Results   Component Value Date    WBC 9.98 11/16/2018    HGB 13.6 11/16/2018    HCT 43.2 11/16/2018     (L) 11/16/2018    CHOL 122 11/16/2018    TRIG 103 11/16/2018    HDL 34 (L) 11/16/2018    ALT 18 11/16/2018    AST 27 11/16/2018     11/16/2018    K 4.5 11/16/2018     11/16/2018    CREATININE 1.26 11/16/2018    CALCIUM 9.9 11/16/2018    ALBUMIN 4.1 11/16/2018    BUN 27 (H) 11/16/2018    CO2 29 11/16/2018    TSH 1.750 11/16/2018    INR 1.1 01/25/2005    HGBA1C 5.8 (H) 11/16/2018    LDLCALC 67.4 11/16/2018     (H) 11/16/2018           ROS:  GENERAL: No fever, chills, fatigability or weight loss.  SKIN: No rashes, no itching.  HEAD: No headaches.  EYES: No visual changes  EARS: No ear pain or changes in hearing.  NOSE: No congestion or rhinorrhea.  MOUTH & THROAT: No hoarseness, change in voice, or sore throat.  NODES: Denies swollen glands.  CHEST: Denies TODD, cyanosis, wheezing, cough and sputum production.  CARDIOVASCULAR: Denies chest pain, PND, orthopnea.  ABDOMEN: No nausea, vomiting, or changes in bowel function.  URINARY: No flank pain, dysuria or hematuria.  PERIPHERAL VASCULAR: No claudication or cyanosis.  MUSCULOSKELETAL: No joint stiffness or swelling. Denies back pain.  NEUROLOGIC:  Right leg numbness getting better    Vital signs reviewed  PE:    APPEARANCE: Well nourished, well developed, in no acute distress.    HEAD: Normocephalic, atraumatic.  EYES:     Conjunctivae noninjected.  CHEST: Good inspiratory effort. Lungs clear to auscultation with no wheezes or crackles.  CARDIOVASCULAR: Normal S1, S2. No rubs, murmurs, or gallops.  EXTREMITIES: No edema, cyanosis, or clubbing.      IMPRESSION  1. Hypertension, unspecified type            PLAN  Hypertension Controlled.  Continu amlodipine 5 mg daily with Hyzaar 100/25     She has endocrine follow-up coming up in March, her diabetes and thyroid labs can be checked at that time but they are fairly stable at this time based on most recent labs above    HEALTH SCREENINGS  Immunization:  Tdap in 2015   Pneumovax :  Up-to-date  Flu: utd  Pcv:  09/22/2017  Zvx: UTD  Mammogram  11/27/2018     Pap:2016     Fit negative:  11/ 2018     DEXA scan:  11/15/2017:  L-spine T-score -1.4.  Left femoral neck T-score -1.7

## 2019-01-10 ENCOUNTER — TELEPHONE (OUTPATIENT)
Dept: FAMILY MEDICINE | Facility: CLINIC | Age: 69
End: 2019-01-10

## 2019-01-10 NOTE — TELEPHONE ENCOUNTER
----- Message from Steve Pearson sent at 1/10/2019  4:27 PM CST -----  Contact: [facility]  [phone]  Pharmacist states Rx losartan-hydrochlorothiazide 100-25 mg (HYZAAR) 100-25 mg per tablet has been recalled he would like something else called in     Please advise

## 2019-01-10 NOTE — TELEPHONE ENCOUNTER
Pharmacist states losartan-hctz is on back order.  Would like to dispense 50-12.5 with patient taking two daily.  Please advise.

## 2019-01-11 RX ORDER — LOSARTAN POTASSIUM AND HYDROCHLOROTHIAZIDE 12.5; 5 MG/1; MG/1
2 TABLET ORAL DAILY
Qty: 180 TABLET | Refills: 3 | Status: SHIPPED | OUTPATIENT
Start: 2019-01-11 | End: 2019-04-15 | Stop reason: SDUPTHER

## 2019-02-20 RX ORDER — TIMOLOL MALEATE 5 MG/ML
SOLUTION OPHTHALMIC
Qty: 5 ML | Refills: 5 | Status: SHIPPED | OUTPATIENT
Start: 2019-02-20 | End: 2020-04-07

## 2019-03-06 ENCOUNTER — OFFICE VISIT (OUTPATIENT)
Dept: ENDOCRINOLOGY | Facility: CLINIC | Age: 69
End: 2019-03-06
Payer: COMMERCIAL

## 2019-03-06 VITALS
BODY MASS INDEX: 42.67 KG/M2 | SYSTOLIC BLOOD PRESSURE: 123 MMHG | HEART RATE: 80 BPM | DIASTOLIC BLOOD PRESSURE: 74 MMHG | WEIGHT: 226 LBS | HEIGHT: 61 IN

## 2019-03-06 DIAGNOSIS — E03.9 PRIMARY HYPOTHYROIDISM: ICD-10-CM

## 2019-03-06 PROCEDURE — 99999 PR PBB SHADOW E&M-EST. PATIENT-LVL III: ICD-10-PCS | Mod: PBBFAC,,, | Performed by: INTERNAL MEDICINE

## 2019-03-06 PROCEDURE — 99214 PR OFFICE/OUTPT VISIT, EST, LEVL IV, 30-39 MIN: ICD-10-PCS | Mod: S$GLB,,, | Performed by: INTERNAL MEDICINE

## 2019-03-06 PROCEDURE — 1101F PT FALLS ASSESS-DOCD LE1/YR: CPT | Mod: CPTII,S$GLB,, | Performed by: INTERNAL MEDICINE

## 2019-03-06 PROCEDURE — 3078F DIAST BP <80 MM HG: CPT | Mod: CPTII,S$GLB,, | Performed by: INTERNAL MEDICINE

## 2019-03-06 PROCEDURE — 3074F SYST BP LT 130 MM HG: CPT | Mod: CPTII,S$GLB,, | Performed by: INTERNAL MEDICINE

## 2019-03-06 PROCEDURE — 3044F HG A1C LEVEL LT 7.0%: CPT | Mod: CPTII,S$GLB,, | Performed by: INTERNAL MEDICINE

## 2019-03-06 PROCEDURE — 99999 PR PBB SHADOW E&M-EST. PATIENT-LVL III: CPT | Mod: PBBFAC,,, | Performed by: INTERNAL MEDICINE

## 2019-03-06 PROCEDURE — 3074F PR MOST RECENT SYSTOLIC BLOOD PRESSURE < 130 MM HG: ICD-10-PCS | Mod: CPTII,S$GLB,, | Performed by: INTERNAL MEDICINE

## 2019-03-06 PROCEDURE — 3078F PR MOST RECENT DIASTOLIC BLOOD PRESSURE < 80 MM HG: ICD-10-PCS | Mod: CPTII,S$GLB,, | Performed by: INTERNAL MEDICINE

## 2019-03-06 PROCEDURE — 99214 OFFICE O/P EST MOD 30 MIN: CPT | Mod: S$GLB,,, | Performed by: INTERNAL MEDICINE

## 2019-03-06 PROCEDURE — 3044F PR MOST RECENT HEMOGLOBIN A1C LEVEL <7.0%: ICD-10-PCS | Mod: CPTII,S$GLB,, | Performed by: INTERNAL MEDICINE

## 2019-03-06 PROCEDURE — 1101F PR PT FALLS ASSESS DOC 0-1 FALLS W/OUT INJ PAST YR: ICD-10-PCS | Mod: CPTII,S$GLB,, | Performed by: INTERNAL MEDICINE

## 2019-03-06 NOTE — PROGRESS NOTES
"Subjective:      Patient ID: Hunter Cisneros is a 68 y.o. female.    Chief Complaint:  Other Misc      History of Present Illness  F/u visit for T2DM     Postablative Hypothyroidism (secondary to I-131 in 2004 (believed to be for compressive goiter)   She is compliant with thyroid hormone 137 mcg x 6 days/week and taking appropriately.   Denies Fatigue, Palpitations, Diarrhea/constipation  Sometimes heat intolerance     she was diagnosed with T2DM 7/11, with CKD 3  On metformin 500mg bid.     BG range 100 mg/dl range   UTD ophtho     Is morbidly obese:   Lost 20lbs, then since regained 10 lbs   Gym-4-5 days per week; pt started in June 2015  Staying away from fast food. Has decreased portions. Trying to cut back on carbs    CKD 3 Followed by renal   HTN, taking losartan-HCT  HLD, on lipitor 80mg qhs      Vit D def and osteopenia spine and fem neck (BMD in 2017)  Taking 1000 iu daily, Denies fractures       Review of Systems   Constitutional: Negative for unexpected weight change.   Eyes: Negative for visual disturbance.   Cardiovascular: Negative for palpitations.   Gastrointestinal: Negative for abdominal pain.   Endocrine: Positive for heat intolerance.   Skin: Negative for wound.   Neurological: Negative for numbness.   Psychiatric/Behavioral: Negative for sleep disturbance.       Objective:        102.5 kg (225 lb 15.5 oz), 5' 1" (1.549 m)80bpm, 123/74 BMI 42.7  Specialty Comments:      Lab Review:   Lab Results   Component Value Date    HGBA1C 5.8 (H) 11/16/2018     Lab Results   Component Value Date    TSH 1.750 11/16/2018       Assessment:     No diagnosis found.     Plan:     Hunter was seen today for follow-up.    Diagnoses and all orders for this visit:    Postablative hypothyroidism  Cont lt4 137mcg x 6 days/week and follow TSH 6-12 mo     Type 2 diabetes mellitus without complication, without long-term current use of insulin  Repeat labs today, at goal based on reported BG   Reporting invokana is " too expensive even though covered by insurance,   Continue metformin 500 mg BID   -     Hemoglobin A1c; Future  -     Microalbumin/creatinine urine ratio    Osteopenia, unspecified location  Repeat in 2019  Fall prevention   Cont vitamin D     CKD (chronic kidney disease) stage 3, GFR 30-59 ml/min  F/u renal     Morbid obesity  Discussed dietary health choices, cont exercise, on SGLT-2    Vitamin D deficiency  Replete     rtc 1 year

## 2019-04-10 DIAGNOSIS — H40.053 BILATERAL OCULAR HYPERTENSION: ICD-10-CM

## 2019-04-13 RX ORDER — LATANOPROST 50 UG/ML
SOLUTION/ DROPS OPHTHALMIC
Qty: 3 BOTTLE | Refills: 11 | Status: SHIPPED | OUTPATIENT
Start: 2019-04-13 | End: 2020-07-06 | Stop reason: SDUPTHER

## 2019-04-15 RX ORDER — LOSARTAN POTASSIUM AND HYDROCHLOROTHIAZIDE 12.5; 5 MG/1; MG/1
2 TABLET ORAL DAILY
Qty: 180 TABLET | Refills: 3 | Status: SHIPPED | OUTPATIENT
Start: 2019-04-15 | End: 2020-02-24

## 2019-04-15 NOTE — TELEPHONE ENCOUNTER
----- Message from Lauren Ramos sent at 4/15/2019  4:42 PM CDT -----  Contact: [phone]/self  Type:  RX Refill Request    Who Called: patient  Refill or New Rx:   RX Name and Strength: losartan-hydrochlorothiazide 50-12.5 mg (HYZAAR) 50-12.5 mg per tablet  How is the patient currently taking it? (ex. 1XDay):  Is this a 30 day or 90 day RX: 90 day  Preferred Pharmacy with phone number: [facility]  Local or Mail Order: local  Ordering Provider: Dr. Javier  Would the patient rather a call back or a response via [facility]? Call back  Best Call Back Number: [phone]  Additional Information: medication is on back order so she'll need an alternative sent

## 2019-04-22 ENCOUNTER — TELEPHONE (OUTPATIENT)
Dept: FAMILY MEDICINE | Facility: CLINIC | Age: 69
End: 2019-04-22

## 2019-04-22 RX ORDER — LOSARTAN POTASSIUM AND HYDROCHLOROTHIAZIDE 25; 100 MG/1; MG/1
TABLET ORAL
Qty: 30 TABLET | Refills: 11 | Status: SHIPPED | OUTPATIENT
Start: 2019-04-22 | End: 2020-02-24

## 2019-04-22 NOTE — TELEPHONE ENCOUNTER
Called pharmacy to give verbal okay to separate the two blood pressure medications, losartan-hydrochlorothiazide.  Pharmacist said they have the medication, and will fill it no need to separate them.

## 2019-04-22 NOTE — TELEPHONE ENCOUNTER
Returned pt phone call and let her know that I spoke to the pharmacy and sent the request to the  And will give pharmacy a call back as soon as we receive his recommendations.

## 2019-04-22 NOTE — TELEPHONE ENCOUNTER
----- Message from Katie Cota sent at 4/22/2019 12:15 PM CDT -----  Contact: [phone] [facility]   Pharmacy would like to speak with you about changing pt's medication losartan-hydrochlorothiazide 50-12.5 mg (HYZAAR) 50-12.5 mg per tablet. Please advise.

## 2019-04-22 NOTE — TELEPHONE ENCOUNTER
Returned pharmacy phone call, pharmacy stated that the losartan-hydrochlorothiazide is on back order and would like to know if it can . Please Advise.

## 2019-04-22 NOTE — TELEPHONE ENCOUNTER
----- Message from Juventino Quintanilla sent at 4/22/2019 12:16 PM CDT -----  Contact: self/[phone]  Patient called to speak with your office about her blood pressure being on  back order and she has been out for about a week now.    She stated she called numerous times last week and also her pharmacy reached to your office but no one has called either back.    Please call to discuss today.

## 2019-05-24 DIAGNOSIS — E11.9 TYPE 2 DIABETES MELLITUS WITHOUT COMPLICATION: ICD-10-CM

## 2019-05-30 ENCOUNTER — OFFICE VISIT (OUTPATIENT)
Dept: OPTOMETRY | Facility: CLINIC | Age: 69
End: 2019-05-30
Payer: COMMERCIAL

## 2019-05-30 ENCOUNTER — CLINICAL SUPPORT (OUTPATIENT)
Dept: OPHTHALMOLOGY | Facility: CLINIC | Age: 69
End: 2019-05-30
Payer: COMMERCIAL

## 2019-05-30 DIAGNOSIS — H52.4 HYPEROPIA WITH ASTIGMATISM AND PRESBYOPIA, BILATERAL: ICD-10-CM

## 2019-05-30 DIAGNOSIS — H52.203 HYPEROPIA WITH ASTIGMATISM AND PRESBYOPIA, BILATERAL: ICD-10-CM

## 2019-05-30 DIAGNOSIS — H26.9 CORTICAL CATARACT OF BOTH EYES: ICD-10-CM

## 2019-05-30 DIAGNOSIS — H40.053 OCULAR HYPERTENSION, BILATERAL: Primary | ICD-10-CM

## 2019-05-30 DIAGNOSIS — H25.13 NUCLEAR SCLEROSIS, BILATERAL: ICD-10-CM

## 2019-05-30 DIAGNOSIS — E11.9 TYPE 2 DIABETES MELLITUS WITHOUT RETINOPATHY: ICD-10-CM

## 2019-05-30 DIAGNOSIS — H52.03 HYPEROPIA WITH ASTIGMATISM AND PRESBYOPIA, BILATERAL: ICD-10-CM

## 2019-05-30 PROCEDURE — 92083 HUMPHREY VISUAL FIELD - OU - BOTH EYES: ICD-10-PCS | Mod: S$GLB,,, | Performed by: OPTOMETRIST

## 2019-05-30 PROCEDURE — 99999 PR PBB SHADOW E&M-EST. PATIENT-LVL III: ICD-10-PCS | Mod: PBBFAC,,, | Performed by: OPTOMETRIST

## 2019-05-30 PROCEDURE — 92083 EXTENDED VISUAL FIELD XM: CPT | Mod: S$GLB,,, | Performed by: OPTOMETRIST

## 2019-05-30 PROCEDURE — 92015 DETERMINE REFRACTIVE STATE: CPT | Mod: S$GLB,,, | Performed by: OPTOMETRIST

## 2019-05-30 PROCEDURE — 92014 PR EYE EXAM, EST PATIENT,COMPREHESV: ICD-10-PCS | Mod: S$GLB,,, | Performed by: OPTOMETRIST

## 2019-05-30 PROCEDURE — 92015 PR REFRACTION: ICD-10-PCS | Mod: S$GLB,,, | Performed by: OPTOMETRIST

## 2019-05-30 PROCEDURE — 99999 PR PBB SHADOW E&M-EST. PATIENT-LVL III: CPT | Mod: PBBFAC,,, | Performed by: OPTOMETRIST

## 2019-05-30 PROCEDURE — 92014 COMPRE OPH EXAM EST PT 1/>: CPT | Mod: S$GLB,,, | Performed by: OPTOMETRIST

## 2019-05-30 NOTE — PATIENT INSTRUCTIONS
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    Early cortical cataract in both eyes.  No need for cataract surgery in either eye.     Prior diagnosis of ocular hypertension.  No evidence of glaucomatous changes in the appearance of the optic nerve head in either eye.  Using timolol maleate 0.5% ophthalmic solution every morning in both eyes, and Latanoprost 0.005% ophthalmic solution every evening in both eyes for IOP control.  Intraocular pressure high-normal/borderline in each eye.  HVF test (24-2 EMANUEL Standard) done today.  Results poorly reliable in each eye.  Taking results at face:        OD inferior paracentral defect and superior paracentral defect       OS  Inferior paracentral defect and superior paracentral defect     Continue Latanoprost 0.005% every evening in both eyes, and continue Timolol Maleate 0.5% every morning in both eyes.    Hyperopia with astigmatism in each eye, with satisfactory best-corrected VA in each eye. Presbyopia consistent with age   New spectacle lens Rx issued for full-time wear.   Return for recheck in May - June, 2020, or prior if any problems noted in the interim.

## 2019-05-30 NOTE — PROGRESS NOTES
HPI     Ocular Hypertension      Additional comments: Pt returns for overdue ocular health check. No   complaints.  Prior diagnosis of bilateral ocular hypertension.  Repeat HVF test (24-2) done today.  Using drops for IOP control/reduction in both eyes: Latanooprost 000.5%,   and Timolol 0.5%              Comments     Patient's age: 68 y.o.  Occupation: Housewife  Approximate date of last eye examination:  06/27/2018  Name of last eye doctor seen: Dr Aquino, Dr. Sykes - 08/18/2018 (Dx:   bilateral ocular hypertension)  City/State: [place]  Wears glasses? Yes     If yes, wears  Full-time or part-time?  Full-time  Present glasses are: Bifocal, SV Distance, SV Reading?  Progressive lenses  Approximate age of present glasses:  2 + years old   Got new glasses following last exam, or subsequently?: Yes   Any problem with VA with glasses?  No  Wears CLs?:  No  Headaches?  No  Eye pain/discomfort?  No                                                                                     Flashes?  No  Floaters?  No  Diplopia/Double vision?  No  Patient's Ocular History:         Any eye surgeries? No         Any eye injury?  No         Any treatment for eye disease?  Ocular Hypertension   Family history of eye disease?    Maternal Uncle + Glaucoma  Significant patient medical history:         1. Diabetes?  Yes, diagnosed 7/2012 - Controlled by diet and meds   (Metformin and Invocana)  LBS - 101 this am       If yes, IDDM or NIDDM? NIDDM   2. HBP?  Yes, controlled with medication               3. Other (describe):  Thyroid, high cholesterol     ! OTC eyedrops currently using:  None   ! Prescription eye meds currently using:                                 Latanoprost QHS OU                               Timolol 0.5% Gel-forming solution QAM OU   ! Any history of allergy/adverse reaction to any eye meds used   previously?  No    ! Any history of allergy/adverse reaction to eyedrops used during prior   eye exam(s)? No    ! Any  "history of allergy/adverse reaction to Novacaine or similar meds?   No   ! Any history of allergy/adverse reaction to Epinephrine or similar meds?   No    ! Patient okay with use of anesthetic eyedrops to check eye pressure?    Yes        ! Patient okay with use of eyedrops to dilate pupils today?  Yes    !  Allergies/Medications/Medical History/Family History reviewed today?    Yes    PD 69/65  Reading dist =  14.5"                              Last edited by Cesario Aquino, OD on 5/30/2019 11:37 AM. (History)            Assessment /Plan     For exam results, see Encounter Report.    1. Ocular hypertension, bilateral  [place] Visual Field - OU - Extended - Both Eyes    [place] Visual Field - OU - Extended - Both Eyes   2. Nuclear sclerosis, bilateral     3. Type 2 diabetes mellitus without retinopathy     4. Hyperopia with astigmatism and presbyopia, bilateral     5. Cortical cataract of both eyes                    Prior diagnosis of nuclear sclerosis of lens of both eyes.    Early cortical cataract in both eyes.  No need for cataract surgery in either eye.     Prior diagnosis of ocular hypertension.  No evidence of glaucomatous changes in the appearance of the optic nerve head in either eye.  Using timolol maleate 0.5% ophthalmic solution every morning in both eyes, and Latanoprost 0.005% ophthalmic solution every evening in both eyes for IOP control.  Intraocular pressure high-normal/borderline in each eye.  HVF test (24-2 EMANUEL Standard) done today.  Results poorly reliable in each eye.HVF test (24-2 EMANUEL Standard) done today.  Results poorly reliable in each eye.    Taking results at face:        OD inferior paracentral defect and superior paracentral defect       OS  Inferior paracentral defect and superior paracentral defect           Continue Latanoprost 0.005% every evening in both eyes, and continue Timolol Maleate 0.5% every morning in both eyes.    Hyperopia with astigmatism in each eye, with " satisfactory best-corrected VA in each eye. Presbyopia consistent with age   New spectacle lens Rx issued for full-time wear.   Return for recheck in May - June, 2020, or prior if any problems noted in the interim.

## 2019-07-12 DIAGNOSIS — E11.9 TYPE 2 DIABETES MELLITUS WITHOUT COMPLICATION, WITHOUT LONG-TERM CURRENT USE OF INSULIN: Primary | ICD-10-CM

## 2019-07-12 RX ORDER — METFORMIN HYDROCHLORIDE 500 MG/1
500 TABLET ORAL 2 TIMES DAILY WITH MEALS
Qty: 180 TABLET | Refills: 3 | Status: SHIPPED | OUTPATIENT
Start: 2019-07-12 | End: 2019-07-15 | Stop reason: SDUPTHER

## 2019-07-12 NOTE — TELEPHONE ENCOUNTER
----- Message from Jess Seo sent at 7/12/2019  9:00 AM CDT -----  OUT  OF MED       REQUESTED      NO RESPONSE  FORMER PT OF DR FERNANDES      90 DAY SUPPLY      metFORMIN (GLUCOPHAGE) 500 MG tablet     [facility] [address] [place] [address] [address]    THANKS

## 2019-07-15 DIAGNOSIS — E11.9 TYPE 2 DIABETES MELLITUS WITHOUT COMPLICATION, WITHOUT LONG-TERM CURRENT USE OF INSULIN: ICD-10-CM

## 2019-07-15 RX ORDER — METFORMIN HYDROCHLORIDE 500 MG/1
500 TABLET ORAL 2 TIMES DAILY WITH MEALS
Qty: 180 TABLET | Refills: 3 | Status: SHIPPED | OUTPATIENT
Start: 2019-07-15 | End: 2020-07-14 | Stop reason: SDUPTHER

## 2019-07-19 RX ORDER — METFORMIN HYDROCHLORIDE 500 MG/1
TABLET ORAL
Qty: 180 TABLET | Refills: 3 | OUTPATIENT
Start: 2019-07-19

## 2019-10-23 ENCOUNTER — TELEPHONE (OUTPATIENT)
Dept: FAMILY MEDICINE | Facility: CLINIC | Age: 69
End: 2019-10-23

## 2019-10-23 DIAGNOSIS — I10 ESSENTIAL HYPERTENSION: Primary | ICD-10-CM

## 2019-10-23 NOTE — TELEPHONE ENCOUNTER
----- Message from Venus Herrera sent at 10/23/2019  1:51 PM CDT -----  Contact: [facility] - [phone]  The below is on back order. Please advise     losartan-hydrochlorothiazide 100-25 mg (HYZAAR) 100-25 mg per tablet 30 tablet

## 2019-10-23 NOTE — TELEPHONE ENCOUNTER
Dr. Le prescribed pt Losartan/HCTZ, but it is on back order and Pharmacist at [facility] would like to know if medication can be . Pls advise.

## 2019-10-23 NOTE — TELEPHONE ENCOUNTER
----- Message from Venus Herrera sent at 10/23/2019  1:51 PM CDT -----  Contact: [facility] - [phone]  The below is on back order. Please advise     losartan-hydrochlorothiazide 100-25 mg (HYZAAR) 100-25 mg per tablet 30 tablet

## 2019-10-24 RX ORDER — HYDROCHLOROTHIAZIDE 25 MG/1
25 TABLET ORAL DAILY
Qty: 90 TABLET | Refills: 3 | Status: SHIPPED | OUTPATIENT
Start: 2019-10-24 | End: 2020-11-10 | Stop reason: SDUPTHER

## 2019-10-24 RX ORDER — LOSARTAN POTASSIUM 100 MG/1
100 TABLET ORAL DAILY
Qty: 90 TABLET | Refills: 3 | Status: SHIPPED | OUTPATIENT
Start: 2019-10-24 | End: 2020-11-10 | Stop reason: SDUPTHER

## 2019-11-19 ENCOUNTER — TELEPHONE (OUTPATIENT)
Dept: FAMILY MEDICINE | Facility: CLINIC | Age: 69
End: 2019-11-19

## 2019-11-19 DIAGNOSIS — Z12.31 ENCOUNTER FOR SCREENING MAMMOGRAM FOR BREAST CANCER: Primary | ICD-10-CM

## 2019-11-19 NOTE — TELEPHONE ENCOUNTER
----- Message from Janis Sainz sent at 11/19/2019  1:27 PM CST -----  Contact: Patient   Type:  Mammogram    Caller is requesting to schedule their annual mammogram appointment.  Order is not listed in EPIC.  Please enter order and contact patient to schedule.  Name of Caller: Hunter  Where would they like the mammogram performed? [facility]  Would the patient rather a call back or a response via [facility]? Call back  Best Call Back Number:[phone]  Additional Information: no

## 2019-11-22 DIAGNOSIS — E11.9 TYPE 2 DIABETES MELLITUS WITHOUT COMPLICATION: ICD-10-CM

## 2019-11-29 ENCOUNTER — HOSPITAL ENCOUNTER (OUTPATIENT)
Dept: RADIOLOGY | Facility: HOSPITAL | Age: 69
Discharge: HOME | End: 2019-11-29
Attending: FAMILY MEDICINE
Payer: COMMERCIAL

## 2019-11-29 DIAGNOSIS — Z12.31 ENCOUNTER FOR SCREENING MAMMOGRAM FOR BREAST CANCER: ICD-10-CM

## 2019-11-29 PROCEDURE — 77063 BREAST TOMOSYNTHESIS BI: CPT | Mod: TC,PO

## 2019-12-23 RX ORDER — LEVOTHYROXINE SODIUM 137 UG/1
TABLET ORAL
Refills: 0 | OUTPATIENT
Start: 2019-12-23

## 2019-12-27 RX ORDER — ROSUVASTATIN CALCIUM 40 MG/1
TABLET, COATED ORAL
Qty: 60 TABLET | Refills: 0 | Status: SHIPPED | OUTPATIENT
Start: 2019-12-27 | End: 2020-04-15 | Stop reason: SDUPTHER

## 2019-12-29 RX ORDER — LEVOTHYROXINE SODIUM 137 UG/1
TABLET ORAL
Qty: 90 TABLET | Refills: 0 | Status: SHIPPED | OUTPATIENT
Start: 2019-12-29 | End: 2020-04-15 | Stop reason: SDUPTHER

## 2020-02-24 ENCOUNTER — OFFICE VISIT (OUTPATIENT)
Dept: FAMILY MEDICINE | Facility: CLINIC | Age: 70
End: 2020-02-24
Payer: COMMERCIAL

## 2020-02-24 VITALS
DIASTOLIC BLOOD PRESSURE: 70 MMHG | HEIGHT: 61 IN | HEART RATE: 80 BPM | SYSTOLIC BLOOD PRESSURE: 132 MMHG | OXYGEN SATURATION: 96 % | BODY MASS INDEX: 41.34 KG/M2 | TEMPERATURE: 98 F | WEIGHT: 218.94 LBS

## 2020-02-24 DIAGNOSIS — E03.9 HYPOTHYROIDISM, UNSPECIFIED TYPE: ICD-10-CM

## 2020-02-24 DIAGNOSIS — N18.30 DIABETES MELLITUS WITH STAGE 3 CHRONIC KIDNEY DISEASE: ICD-10-CM

## 2020-02-24 DIAGNOSIS — E55.9 VITAMIN D DEFICIENCY: ICD-10-CM

## 2020-02-24 DIAGNOSIS — Z00.00 ROUTINE GENERAL MEDICAL EXAMINATION AT A HEALTH CARE FACILITY: Primary | ICD-10-CM

## 2020-02-24 DIAGNOSIS — I10 HYPERTENSION, UNSPECIFIED TYPE: ICD-10-CM

## 2020-02-24 DIAGNOSIS — Z12.11 COLON CANCER SCREENING: ICD-10-CM

## 2020-02-24 DIAGNOSIS — E11.22 DIABETES MELLITUS WITH STAGE 3 CHRONIC KIDNEY DISEASE: ICD-10-CM

## 2020-02-24 DIAGNOSIS — M89.9 BONE DISORDER: ICD-10-CM

## 2020-02-24 DIAGNOSIS — I10 ESSENTIAL HYPERTENSION: ICD-10-CM

## 2020-02-24 DIAGNOSIS — N18.30 CKD (CHRONIC KIDNEY DISEASE) STAGE 3, GFR 30-59 ML/MIN: ICD-10-CM

## 2020-02-24 DIAGNOSIS — M85.80 OSTEOPENIA, UNSPECIFIED LOCATION: ICD-10-CM

## 2020-02-24 PROCEDURE — 3078F DIAST BP <80 MM HG: CPT | Mod: CPTII,S$GLB,, | Performed by: FAMILY MEDICINE

## 2020-02-24 PROCEDURE — 99397 PER PM REEVAL EST PAT 65+ YR: CPT | Mod: S$GLB,,, | Performed by: FAMILY MEDICINE

## 2020-02-24 PROCEDURE — 3075F SYST BP GE 130 - 139MM HG: CPT | Mod: CPTII,S$GLB,, | Performed by: FAMILY MEDICINE

## 2020-02-24 PROCEDURE — 99999 PR PBB SHADOW E&M-EST. PATIENT-LVL III: CPT | Mod: PBBFAC,,, | Performed by: FAMILY MEDICINE

## 2020-02-24 PROCEDURE — 3075F PR MOST RECENT SYSTOLIC BLOOD PRESS GE 130-139MM HG: ICD-10-PCS | Mod: CPTII,S$GLB,, | Performed by: FAMILY MEDICINE

## 2020-02-24 PROCEDURE — 99397 PR PREVENTIVE VISIT,EST,65 & OVER: ICD-10-PCS | Mod: S$GLB,,, | Performed by: FAMILY MEDICINE

## 2020-02-24 PROCEDURE — 3078F PR MOST RECENT DIASTOLIC BLOOD PRESSURE < 80 MM HG: ICD-10-PCS | Mod: CPTII,S$GLB,, | Performed by: FAMILY MEDICINE

## 2020-02-24 PROCEDURE — 99999 PR PBB SHADOW E&M-EST. PATIENT-LVL III: ICD-10-PCS | Mod: PBBFAC,,, | Performed by: FAMILY MEDICINE

## 2020-02-24 NOTE — PROGRESS NOTES
(Portions of this note were dictated using voice recognition software and may contain dictation related errors in spelling/grammar/syntax not found on text review)    CC:   Chief Complaint   Patient presents with    Annual Exam       HPI: 69 y.o. female here for annual exam    Hypertension on losartan 100 mg daily, hydrochlorothiazide 25 mg daily, amlodipine 5 mg daily  .  BP stable     Hyperlipidemia, Crestor 40 mg daily.      Diabetes, controlled on metformin 500 b.i.d.      Hypothyroidism on Synthroid, 137 but given suppressed TSH was advised to decrease this to 6 days a week  (was on 125 in the past but TSH was too high)     Meralgia paresthetica bilaterally, discussed at last visit.  Takes gabapentin once a day and then again is needed.  Still gets numbness sensation bilateral outer thighs, no radiating.  Has not done much exercise but plans on getting started.  Has tried to change around some things in her diet and has lost little bit of weight.    Past Medical History:   Diagnosis Date    Cataract     CKD (chronic kidney disease) stage 3, GFR 30-59 ml/min 3/21/2014    Diabetes mellitus type II     GERD (gastroesophageal reflux disease)     Glaucoma (increased eye pressure)     HLD (hyperlipidemia)     HTN (hypertension)     Hypothyroidism     Morbid obesity 3/21/2014    Osteopenia     Vitamin D deficiency 3/21/2014       Past Surgical History:   Procedure Laterality Date     SECTION, LOW TRANSVERSE         Family History   Problem Relation Age of Onset    Coronary artery disease Mother         premature, 57    Cancer Sister     Breast cancer Sister     No Known Problems Father     Diabetes Unknown     Hypertension Unknown     Thyroid disease Unknown     Glaucoma Maternal Uncle     Diabetes Maternal Uncle     No Known Problems Brother     No Known Problems Maternal Aunt     No Known Problems Paternal Aunt     No Known Problems Paternal Uncle     No Known Problems Maternal  Grandmother     No Known Problems Maternal Grandfather     No Known Problems Paternal Grandmother     No Known Problems Paternal Grandfather     Amblyopia Neg Hx     Blindness Neg Hx     Cataracts Neg Hx     Macular degeneration Neg Hx     Retinal detachment Neg Hx     Strabismus Neg Hx     Stroke Neg Hx        Social History     Socioeconomic History    Marital status:      Spouse name: Not on file    Number of children: Not on file    Years of education: Not on file    Highest education level: Not on file   Occupational History    Not on file   Social Needs    Financial resource strain: Not on file    Food insecurity:     Worry: Not on file     Inability: Not on file    Transportation needs:     Medical: Not on file     Non-medical: Not on file   Tobacco Use    Smoking status: Former Smoker     Types: Cigarettes    Tobacco comment: occasionally   Substance and Sexual Activity    Alcohol use: No    Drug use: No    Sexual activity: Not on file   Lifestyle    Physical activity:     Days per week: Not on file     Minutes per session: Not on file    Stress: Not on file   Relationships    Social connections:     Talks on phone: Not on file     Gets together: Not on file     Attends [employer] service: Not on file     Active member of club or organization: Not on file     Attends meetings of clubs or organizations: Not on file     Relationship status: Not on file   Other Topics Concern    Not on file   Social History Narrative    Not on file       Lab Results   Component Value Date    WBC 9.98 11/16/2018    HGB 13.6 11/16/2018    HCT 43.2 11/16/2018    MCV 89 11/16/2018     (L) 11/16/2018    CHOL 122 11/16/2018    TRIG 103 11/16/2018    HDL 34 (L) 11/16/2018    ALT 18 11/16/2018    AST 27 11/16/2018    BILITOT 0.5 11/16/2018    ALKPHOS 80 11/16/2018     11/16/2018    K 4.5 11/16/2018     11/16/2018    CREATININE 1.26 11/16/2018    ESTGFRAFRICA 51.3 (A) 11/16/2018     EGFRNONAA 44.5 (A) 11/16/2018    CALCIUM 9.9 11/16/2018    ALBUMIN 4.1 11/16/2018    BUN 27 (H) 11/16/2018    CO2 29 11/16/2018    TSH 1.750 11/16/2018    INR 1.1 01/25/2005    HGBA1C 5.8 (H) 11/16/2018    MICALBCREAT 7.1 11/03/2016    LDLCALC 67.4 11/16/2018     (H) 11/16/2018             Hemoglobin A1C (%)   Date Value   11/16/2018 5.8 (H)   04/18/2018 5.7 (H)   09/14/2017 5.8 (H)   03/07/2017 6.3 (H)   11/03/2016 7.4 (H)   06/02/2016 7.1 (H)     eGFR if African American (mL/min/1.73 m^2)   Date Value   11/16/2018 51.3 (A)   06/16/2017 55.8 (A)   03/07/2017 51.5 (A)   11/03/2016 55 (A)   06/02/2016 >60.0   11/02/2015 >60                   Vital signs reviewed  PE:   APPEARANCE: Well nourished, well developed, in no acute distress.    HEAD: Normocephalic, atraumatic.  EYES: PERRL. EOMI.   Conjunctivae noninjected.  EARS: TM's intact. Light reflex normal. No retraction or perforation.    NOSE: Mucosa pink. Airway clear.  MOUTH & THROAT: No tonsillar enlargement. No pharyngeal erythema or exudate.   NECK: Supple with no cervical lymphadenopathy.  No carotid bruits.  No thyromegaly  CHEST: Good inspiratory effort. Lungs clear to auscultation with no wheezes or crackles.  CARDIOVASCULAR: Normal S1, S2. No rubs, murmurs, or gallops.  ABDOMEN: Bowel sounds normal. Not distended. Soft. No tenderness or masses. No organomegaly.  EXTREMITIES: No edema, cyanosis, or clubbing.  Does have some pain over bilateral greater trochanters and also along lateral thigh palpation  DIABETIC FOOT EXAM: Protective Sensation (w/ 10 gram monofilament):  Right: Intact  Left: Intact    Visual Inspection:  Normal -  Bilateral and Onychomycosis -  Bilateral    Pedal Pulses:   Right: Present  Left: Present    Posterior tibialis:   Right:Present  Left: Present        IMPRESSION  1. Routine general medical examination at a health care facility    2. Essential hypertension    3. Hypertension, unspecified type    4. Hypothyroidism,  unspecified type    5. CKD (chronic kidney disease) stage 3, GFR 30-59 ml/min    6. Diabetes mellitus with stage 3 chronic kidney disease    7. Vitamin D deficiency    8. Osteopenia, unspecified location    9. Bone disorder    10. Colon cancer screening        PLAN  Orders Placed This Encounter   Procedures    DXA Bone Density Spine And Hip    CBC auto differential    Comprehensive metabolic panel    Lipid panel    TSH    Hemoglobin A1c    Fecal Immunochemical Test (iFOBT)     Diabetes health maintenance:  -- advise regular and consistent glucose monitoring and medication compliance.  -- advise daily foot checks  -- advise yearly ophthalmologic exams  -- advise adequate dietary and exercise modification  -- advise regular dental visits  -- advise daily low-dose aspirin use if patient is not on other anticoagulants and there are no other contraindications.  -- Medication management:  Continue metformin 500 b.i.d.    Dyslipidemia:  Continue Crestor    Hypothyroidism:  Continue Synthroid 137 mcg 6 days a week    Hypertension:  Continue losartan 100 mg daily, hydrochlorothiazide 25 mg daily, amlodipine 5 mg daily.            HEALTH SCREENINGS  Immunization:  Tdap in 2015   Pneumovax : 9/24/12, 2018  Flu: utd  Pcv:  09/22/2017  Shingles vaccine up-to-date  Mammogram 11/19/2019     Pap:2016     Colonoscopy 2008. Multiple small hyperplastic polyps removed. Repeat in 7-10 years, rpt screening due: FIT ordered     DEXA scan:  11/15/2017:  L-spine T-score -1.4.  Left femoral neck T-score -1.7.  Repeat DEXA

## 2020-02-26 ENCOUNTER — HOSPITAL ENCOUNTER (OUTPATIENT)
Dept: RADIOLOGY | Facility: HOSPITAL | Age: 70
Discharge: HOME | End: 2020-02-26
Attending: FAMILY MEDICINE
Payer: COMMERCIAL

## 2020-02-26 ENCOUNTER — LAB VISIT (OUTPATIENT)
Dept: LAB | Facility: HOSPITAL | Age: 70
End: 2020-02-26
Attending: FAMILY MEDICINE
Payer: COMMERCIAL

## 2020-02-26 DIAGNOSIS — M89.9 BONE DISORDER: ICD-10-CM

## 2020-02-26 DIAGNOSIS — Z12.11 COLON CANCER SCREENING: ICD-10-CM

## 2020-02-26 DIAGNOSIS — M85.80 OSTEOPENIA, UNSPECIFIED LOCATION: ICD-10-CM

## 2020-02-26 PROCEDURE — 77080 DXA BONE DENSITY AXIAL: CPT | Mod: TC,PO

## 2020-02-26 PROCEDURE — 82274 ASSAY TEST FOR BLOOD FECAL: CPT

## 2020-03-02 LAB — HEMOCCULT STL QL IA: NEGATIVE

## 2020-04-03 RX ORDER — AMLODIPINE BESYLATE 5 MG/1
TABLET ORAL
Qty: 30 TABLET | Refills: 11 | Status: SHIPPED | OUTPATIENT
Start: 2020-04-03 | End: 2020-11-10 | Stop reason: SDUPTHER

## 2020-04-06 RX ORDER — LEVOTHYROXINE SODIUM 137 UG/1
TABLET ORAL
Refills: 0 | OUTPATIENT
Start: 2020-04-06

## 2020-04-07 RX ORDER — TIMOLOL MALEATE 5 MG/ML
SOLUTION OPHTHALMIC
Qty: 5 ML | Refills: 0 | Status: SHIPPED | OUTPATIENT
Start: 2020-04-07 | End: 2020-07-06 | Stop reason: SDUPTHER

## 2020-04-09 RX ORDER — LEVOTHYROXINE SODIUM 137 UG/1
TABLET ORAL
Refills: 0 | OUTPATIENT
Start: 2020-04-09

## 2020-04-15 ENCOUNTER — TELEPHONE (OUTPATIENT)
Dept: ENDOCRINOLOGY | Facility: CLINIC | Age: 70
End: 2020-04-15

## 2020-04-15 RX ORDER — LEVOTHYROXINE SODIUM 137 UG/1
TABLET ORAL
Qty: 90 TABLET | Refills: 3 | Status: SHIPPED | OUTPATIENT
Start: 2020-04-15 | End: 2020-07-14 | Stop reason: SDUPTHER

## 2020-04-15 RX ORDER — ROSUVASTATIN CALCIUM 40 MG/1
40 TABLET, COATED ORAL NIGHTLY
Qty: 90 TABLET | Refills: 3 | Status: SHIPPED | OUTPATIENT
Start: 2020-04-15 | End: 2020-07-14

## 2020-04-15 NOTE — TELEPHONE ENCOUNTER
Called patient no answer left message per last refill patient to be seen in clinic or contact PCP for further refills patient to call office with any questions.

## 2020-04-15 NOTE — TELEPHONE ENCOUNTER
----- Message from Kaylen Phoenix MA sent at 4/15/2020  2:35 PM CDT -----  Contact: Self [phone]  Dr Aguayo patient   ----- Message -----  From: Selma Paul  Sent: 4/15/2020   2:33 PM CDT  To: [facility] Staff    Pt states she need a refill on her medication levothyroxine (SYNTHROID) 137 MCG Tab tablet  Please send it to [facility] [address] [place], [place] [address] [address] [phone] (Phone)  [phone] (Fax)

## 2020-07-06 DIAGNOSIS — H40.053 BILATERAL OCULAR HYPERTENSION: ICD-10-CM

## 2020-07-06 RX ORDER — TIMOLOL MALEATE 5 MG/ML
SOLUTION OPHTHALMIC
Qty: 5 ML | Refills: 0 | Status: SHIPPED | OUTPATIENT
Start: 2020-07-06 | End: 2021-09-13

## 2020-07-06 RX ORDER — LATANOPROST 50 UG/ML
SOLUTION/ DROPS OPHTHALMIC
Qty: 7.5 ML | Refills: 3 | Status: SHIPPED | OUTPATIENT
Start: 2020-07-06 | End: 2021-12-05

## 2020-07-06 RX ORDER — TIMOLOL MALEATE 5 MG/ML
SOLUTION OPHTHALMIC
Qty: 5 ML | Refills: 5 | Status: SHIPPED | OUTPATIENT
Start: 2020-07-06 | End: 2024-02-08 | Stop reason: SDUPTHER

## 2020-07-07 NOTE — TELEPHONE ENCOUNTER
Called to inform prescription drops into preferred pharmacy for Timolol and Latanoprost.  No ans LVM

## 2020-07-10 NOTE — PROGRESS NOTES
Subjective:      Patient ID: Hunter Cisneros is a 69 y.o. female.    Chief Complaint:  Diabetes    History of Present Illness: Hunter Cisneros is a 69 y.o. woman with Type 2 DM, HTN, CKD stage 3, postablative hypothyroidism, vitamin d deficiency, dyslipidemia, osteopenia, GERD, and ocular hypertension presents for follow up of Type 2 DM. Previous patient of Dr. Aguayo. Last visit in March 2019. This is her first visit with me.     With regards to postablative Hypothyroidism (secondary to I-131 in 2004 (believed to be for compressive goiter)   She is compliant with thyroid hormone 137 mcg x 6 days/week and taking appropriately. Denies missing doses.   Denies Fatigue, Palpitations, Diarrhea  Occasional constipation  Sometimes heat intolerance     Lab Results   Component Value Date    TSH 0.949 02/26/2020     With regards to the diabetes:    Diagnosed with Type 2 DM July 2011  Family History of Type 2 DM: maternal uncles  Known complications:  DKA/HHS: denies  RN: denies; up to date  PN: denies; reports cramping in the middle of the night in legs  Nephropathy: denies  Gastroparesis: denies  CAD: denies    Current regimen:  On metformin 500 mg BID    Missed doses? -none    Other medications tried:  invokana-too expensive    1-2 # times a day testing  Log reviewed: none Oral recall 's  Hypoglycemic event? Denies  Yes, did not need assistance   Knows how to correct with 15 grams of carbs- juice, coke, or a peppermint.     Dietary recall: She feels that she is following diabetic diet but harder with COVID. She is grazing all day since COVID  Eats 3 meals a day.   Snacks: little bit          Drinks: coke every now and then; water    Exercise - tried to stay active. She was going to the gym prior to the pandemic and plans to go back.      Education - last visit: does not wish to go back    iabetes Management Status  Statin: Taking  ACE/ARB: Taking    Lab Results   Component Value Date    HGBA1C 6.0 (H) 02/26/2020     HGBA1C 5.8 (H) 11/16/2018    HGBA1C 5.7 (H) 04/18/2018     Screening or Prevention Patient's value Goal Complete/Controlled?   HgA1C Testing and Control   Lab Results   Component Value Date    HGBA1C 6.0 (H) 02/26/2020      Annually/Less than 8% Yes   Lipid profile : 02/26/2020 Annually Yes   LDL control Lab Results   Component Value Date    LDLCALC 47.2 (L) 02/26/2020    Annually/Less than 100 mg/dl  Yes   Nephropathy screening Lab Results   Component Value Date    LABMICR 9.0 11/03/2016     Lab Results   Component Value Date    PROTEINUA Negative 04/25/2014    Annually No   Blood pressure BP Readings from Last 1 Encounters:   07/14/20 100/60    Less than 140/90 Yes   Dilated retinal exam : 05/30/2019 Annually No   Foot exam   : 07/14/2020 Annually No     With regards to obesity,     Lost 20lbs, then since regained 10 lbs. She lost 3 pounds between Feb and July.     Gym-4-5 days per week; pt started in June 2015. Has not been back to the gym. Staying away from fast food.     Body mass index is 40.7 kg/m².    With regards to osteopenia and Vit D deficiency,     Vit D def and osteopenia spine and fem neck (BMD in 2020)  Taking Vit D 1000 iu daily  Taking calcium 1200 mg daily  Denies fractures    2/26/2020 DXA  COMPARISON:  11/15/2017  FINDINGS:  The T score associated with the lumbar spine is -1.6 on the current examination.  It was -1.4 on the prior examination.  The T score associated with the left femoral neck is -1.4 on the current examination.  It was -1.7 on the prior examination.  The T score associated with the right femoral neck is -1.5 on the current examination.  It was -1.6 on the prior examination.  Impression:  Osteopenia     Ref. Range 11/3/2016 11:16   Vit D, 25-Hydroxy Latest Ref Range: 30 - 96 ng/mL 37     With regards to hypertension,     She is taking losartan-HCTZ  BP at goal today    With regards to dyslipidemia,      She is on crestor 40 mg qhs       Ref. Range 2/26/2020 08:43   Cholesterol  Latest Ref Range: 120 - 199 mg/dL 98 (L)   HDL Latest Ref Range: 40 - 75 mg/dL 32 (L)   Hdl/Cholesterol Ratio Latest Ref Range: 20.0 - 50.0 % 32.7   LDL Cholesterol External Latest Ref Range: 63.0 - 159.0 mg/dL 47.2 (L)   Non-HDL Cholesterol Latest Units: mg/dL 66   Total Cholesterol/HDL Ratio Latest Ref Range: 2.0 - 5.0  3.1   Triglycerides Latest Ref Range: 30 - 150 mg/dL 94     Review of Systems   Constitutional: Positive for unexpected weight change (fluctuates). Negative for fatigue.   Eyes: Negative for visual disturbance.   Cardiovascular: Negative for palpitations.   Gastrointestinal: Positive for constipation. Negative for abdominal pain.   Endocrine: Positive for heat intolerance.   Skin: Negative for wound.   Neurological: Negative for numbness.   Psychiatric/Behavioral: Negative for sleep disturbance.       Objective:        Vitals:    07/14/20 1042   BP: 100/60   Pulse: 75   Resp: 16       Physical Exam  Vitals signs reviewed.   Constitutional:       Appearance: She is well-developed.   Neck:      Thyroid: No thyromegaly.   Cardiovascular:      Rate and Rhythm: Normal rate.   Pulmonary:      Effort: Pulmonary effort is normal.   Abdominal:      Palpations: Abdomen is soft.     Diabetes Foot Exam:   No sores or lesions to bilateral feet  Shoes appropriate  sensation intact to vibration and monofilament    Lab Review:   Lab Results   Component Value Date    HGBA1C 6.0 (H) 02/26/2020     Lab Results   Component Value Date    TSH 0.949 02/26/2020       Assessment:     1. Postablative hypothyroidism  TSH    levothyroxine (SYNTHROID) 137 MCG Tab tablet   2. Type 2 diabetes mellitus without complication, without long-term current use of insulin  Microalbumin/creatinine urine ratio    Lipid Panel    Hemoglobin A1C    Comprehensive metabolic panel    Hemoglobin A1C    metFORMIN (GLUCOPHAGE) 500 MG tablet   3. Vitamin D deficiency  Vitamin D   4. Osteopenia, unspecified location     5. Morbid obesity     6. CKD  (chronic kidney disease) stage 3, GFR 30-59 ml/min     7. DM (diabetes mellitus), type 2, uncontrolled with complications  levothyroxine (SYNTHROID) 137 MCG Tab tablet   8. Mixed hyperlipidemia  Lipid Panel    rosuvastatin (CRESTOR) 40 MG Tab        Plan:     Postablative hypothyroidism  Cont lt4 137mcg x 6 days/week and follow TSH 6-12 mo     Type 2 diabetes mellitus without complication  Repeat labs today as she has been eating more since pandemic. She is at goal based on reported BG. We discussed 6 month visit if rise in A1c  Give up coke! Switch to coke zero. Given list of low carb snacks and dm drinks  Reporting invokana is too expensive even though covered by insurance,   Continue metformin 500 mg BID   -     Hemoglobin A1c; Future  -     Microalbumin/creatinine urine ratio    Encouraged to follow up with PCP regarding cramps in legs    Vitamin D deficiency  -- Check Vit D with next set of labs   -- Continue current vitamin d supplementation    Osteopenia  -- Osteopenia  -- Check DXA in 2022  -- Continue calcium and vitamin d    Morbid obesity  Body mass index is 40.7 kg/m².  -- encouraged dietary and lifestyle modifications   -- emphasized weight loss goals         CKD (chronic kidney disease) stage 3, GFR 30-59 ml/min  -- Follow up with nephrology      Follow up in about 1 year (around 7/14/2021).

## 2020-07-13 NOTE — ASSESSMENT & PLAN NOTE
Body mass index is 40.7 kg/m².  -- encouraged dietary and lifestyle modifications   -- emphasized weight loss goals

## 2020-07-13 NOTE — ASSESSMENT & PLAN NOTE
Repeat labs today as she has been eating more since pandemic. She is at goal based on reported BG. We discussed 6 month visit if rise in A1c  Give up coke! Switch to coke zero. Given list of low carb snacks and dm drinks  Reporting invokana is too expensive even though covered by insurance,   Continue metformin 500 mg BID   -     Hemoglobin A1c; Future  -     Microalbumin/creatinine urine ratio    Encouraged to follow up with PCP regarding cramps in legs

## 2020-07-14 ENCOUNTER — LAB VISIT (OUTPATIENT)
Dept: LAB | Facility: HOSPITAL | Age: 70
End: 2020-07-14
Payer: COMMERCIAL

## 2020-07-14 ENCOUNTER — OFFICE VISIT (OUTPATIENT)
Dept: ENDOCRINOLOGY | Facility: CLINIC | Age: 70
End: 2020-07-14
Payer: COMMERCIAL

## 2020-07-14 ENCOUNTER — TELEPHONE (OUTPATIENT)
Dept: ENDOCRINOLOGY | Facility: CLINIC | Age: 70
End: 2020-07-14

## 2020-07-14 VITALS
BODY MASS INDEX: 40.66 KG/M2 | OXYGEN SATURATION: 98 % | SYSTOLIC BLOOD PRESSURE: 100 MMHG | RESPIRATION RATE: 16 BRPM | HEART RATE: 75 BPM | DIASTOLIC BLOOD PRESSURE: 60 MMHG | WEIGHT: 215.38 LBS | HEIGHT: 61 IN

## 2020-07-14 DIAGNOSIS — E55.9 VITAMIN D DEFICIENCY: ICD-10-CM

## 2020-07-14 DIAGNOSIS — N18.30 CKD (CHRONIC KIDNEY DISEASE) STAGE 3, GFR 30-59 ML/MIN: ICD-10-CM

## 2020-07-14 DIAGNOSIS — E11.9 TYPE 2 DIABETES MELLITUS WITHOUT COMPLICATION, WITHOUT LONG-TERM CURRENT USE OF INSULIN: ICD-10-CM

## 2020-07-14 DIAGNOSIS — E78.2 MIXED HYPERLIPIDEMIA: ICD-10-CM

## 2020-07-14 DIAGNOSIS — E66.01 MORBID OBESITY: ICD-10-CM

## 2020-07-14 DIAGNOSIS — M85.80 OSTEOPENIA, UNSPECIFIED LOCATION: ICD-10-CM

## 2020-07-14 DIAGNOSIS — E89.0 POSTABLATIVE HYPOTHYROIDISM: Primary | ICD-10-CM

## 2020-07-14 PROCEDURE — 99999 PR PBB SHADOW E&M-EST. PATIENT-LVL V: ICD-10-PCS | Mod: PBBFAC,,, | Performed by: NURSE PRACTITIONER

## 2020-07-14 PROCEDURE — 99214 OFFICE O/P EST MOD 30 MIN: CPT | Mod: S$GLB,,, | Performed by: NURSE PRACTITIONER

## 2020-07-14 PROCEDURE — 3074F SYST BP LT 130 MM HG: CPT | Mod: CPTII,S$GLB,, | Performed by: NURSE PRACTITIONER

## 2020-07-14 PROCEDURE — 99999 PR PBB SHADOW E&M-EST. PATIENT-LVL V: CPT | Mod: PBBFAC,,, | Performed by: NURSE PRACTITIONER

## 2020-07-14 PROCEDURE — 1126F PR PAIN SEVERITY QUANTIFIED, NO PAIN PRESENT: ICD-10-PCS | Mod: S$GLB,,, | Performed by: NURSE PRACTITIONER

## 2020-07-14 PROCEDURE — 1101F PR PT FALLS ASSESS DOC 0-1 FALLS W/OUT INJ PAST YR: ICD-10-PCS | Mod: CPTII,S$GLB,, | Performed by: NURSE PRACTITIONER

## 2020-07-14 PROCEDURE — 3044F PR MOST RECENT HEMOGLOBIN A1C LEVEL <7.0%: ICD-10-PCS | Mod: CPTII,S$GLB,, | Performed by: NURSE PRACTITIONER

## 2020-07-14 PROCEDURE — 3008F BODY MASS INDEX DOCD: CPT | Mod: CPTII,S$GLB,, | Performed by: NURSE PRACTITIONER

## 2020-07-14 PROCEDURE — 3044F HG A1C LEVEL LT 7.0%: CPT | Mod: CPTII,S$GLB,, | Performed by: NURSE PRACTITIONER

## 2020-07-14 PROCEDURE — 3078F PR MOST RECENT DIASTOLIC BLOOD PRESSURE < 80 MM HG: ICD-10-PCS | Mod: CPTII,S$GLB,, | Performed by: NURSE PRACTITIONER

## 2020-07-14 PROCEDURE — 3078F DIAST BP <80 MM HG: CPT | Mod: CPTII,S$GLB,, | Performed by: NURSE PRACTITIONER

## 2020-07-14 PROCEDURE — 1159F MED LIST DOCD IN RCRD: CPT | Mod: S$GLB,,, | Performed by: NURSE PRACTITIONER

## 2020-07-14 PROCEDURE — 1101F PT FALLS ASSESS-DOCD LE1/YR: CPT | Mod: CPTII,S$GLB,, | Performed by: NURSE PRACTITIONER

## 2020-07-14 PROCEDURE — 1159F PR MEDICATION LIST DOCUMENTED IN MEDICAL RECORD: ICD-10-PCS | Mod: S$GLB,,, | Performed by: NURSE PRACTITIONER

## 2020-07-14 PROCEDURE — 82043 UR ALBUMIN QUANTITATIVE: CPT

## 2020-07-14 PROCEDURE — 3008F PR BODY MASS INDEX (BMI) DOCUMENTED: ICD-10-PCS | Mod: CPTII,S$GLB,, | Performed by: NURSE PRACTITIONER

## 2020-07-14 PROCEDURE — 3074F PR MOST RECENT SYSTOLIC BLOOD PRESSURE < 130 MM HG: ICD-10-PCS | Mod: CPTII,S$GLB,, | Performed by: NURSE PRACTITIONER

## 2020-07-14 PROCEDURE — 1126F AMNT PAIN NOTED NONE PRSNT: CPT | Mod: S$GLB,,, | Performed by: NURSE PRACTITIONER

## 2020-07-14 PROCEDURE — 99214 PR OFFICE/OUTPT VISIT, EST, LEVL IV, 30-39 MIN: ICD-10-PCS | Mod: S$GLB,,, | Performed by: NURSE PRACTITIONER

## 2020-07-14 RX ORDER — METFORMIN HYDROCHLORIDE 500 MG/1
500 TABLET ORAL 2 TIMES DAILY WITH MEALS
Qty: 180 TABLET | Refills: 3 | Status: SHIPPED | OUTPATIENT
Start: 2020-07-14 | End: 2022-02-07

## 2020-07-14 RX ORDER — LEVOTHYROXINE SODIUM 137 UG/1
TABLET ORAL
Qty: 90 TABLET | Refills: 3 | Status: SHIPPED | OUTPATIENT
Start: 2020-07-14 | End: 2021-06-01 | Stop reason: SDUPTHER

## 2020-07-14 RX ORDER — ROSUVASTATIN CALCIUM 40 MG/1
40 TABLET, COATED ORAL NIGHTLY
Qty: 90 TABLET | Refills: 3 | Status: SHIPPED | OUTPATIENT
Start: 2020-07-14 | End: 2021-12-02

## 2020-07-14 NOTE — TELEPHONE ENCOUNTER
----- Message from Linda Cm NP sent at 7/14/2020 12:38 PM CDT -----  Please call patient and let her know  1 Her A1c is better than last time at 5.7%   2 continue medications and stop regular coke!  3 we will recheck labs in one year on return to clinic

## 2020-07-14 NOTE — PATIENT INSTRUCTIONS
Snacks can be an important part of a balanced, healthy meal plan. They allow you to eat more frequently, feeling full and satisfied throughout the day. Also, they allow you to spread carbohydrates evenly, which may stabilize blood sugars.  Plus, snacks are enjoyable!     The amount of carbohydrate needed at snacks varies. Generally, about 15-30 grams of carbohydrate per snack is recommended.  Below you will find some tasty treats.       0-5 gm carb   Crystal Light   Vitamin Water Zero   Herbal tea, unsweetened   2 tsp peanut butter on celery   1./2 cup sugar-free jell-o   1 sugar-free popsicle   ¼ cup blueberries   8oz Blue Layla unsweetened almond milk   5 baby carrots & celery sticks, cucumbers, bell peppers dipped in ¼ cup salsa, 2Tbsp light ranch dressing or 2Tbsp plain Greek yogurt   10 Goldfish crackers   ½ oz low-fat cheese or string cheese   1 closed handful of nuts, unsalted   1 Tbsp of sunflower seeds, unsalted   1 cup Smart Pop popcorn   1 whole grain brown rice cake        15 gm carb   1 small piece of fruit or ½ banana or 1/2 cup lite canned fruit   3 silva cracker squares   3 cups Smart Pop popcorn, top spray butter, Bond lite salt or cinnamon and Truvia   5 Vanilla Wafers   ½ cup low fat, no added sugar ice cream or frozen yogurt (Blue bell, Blue Bunny, Weight Watchers, Skinny Cow)   ½ turkey, ham, or chicken sandwich   ½ c fruit with ½ c Cottage cheese   4-6 unsalted wheat crackers with 1 oz low fat cheese or 1 tbsp peanut butter    30-45 goldfish crackers (depending on flavor)    7-8 Lutheran mini brown rice cakes (caramel, apple cinnamon, chocolate)    12 Lutheran mini brown rice cakes (cheddar, bbq, ranch)    1/3 cup hummus dip with raw veg   1/2 whole wheat jadyn, 1Tbsp hummus   Mini Pizza (1/2 whole wheat English muffin, low-fat  cheese, tomato sauce)   100 calorie snack pack (Oreo, Chips Ahoy, Ritz Mix, Baked Cheetos)   4-6 oz. light or Greek Style yogurt  (Damian, Enrique, Mya, [facility])   ½ cup sugar-free pudding     6 in. wheat tortilla or jadyn oven toasted chips (topped with spray butter flavoring, cinnamon, Truvia OR spray butter, garlic powder, chili powder)    18 BBQ Popchips (available at [employer], [employer], Fresh Market)       Diabetic drinks we recommend:   Water -BEST  Crystal light sugar free packets  Gatorade zero   Powerade zero  Tea without sweetener    Diabetic drinks we do not recommend:  Coke or any sugary regular soft drink  Coffee with sugar  Milk  Juice  Beer  Liquor    Sweeteners we recommend:  Stevia   Trclint Jones    Note: Caffeine can increase your blood sugar

## 2020-07-15 ENCOUNTER — TELEPHONE (OUTPATIENT)
Dept: ENDOCRINOLOGY | Facility: CLINIC | Age: 70
End: 2020-07-15

## 2020-07-15 LAB
ALBUMIN/CREAT UR: 9.3 UG/MG (ref 0–30)
CREAT UR-MCNC: 183 MG/DL (ref 15–325)
MICROALBUMIN UR DL<=1MG/L-MCNC: 17 UG/ML

## 2020-07-15 NOTE — TELEPHONE ENCOUNTER
Called patient and informed that   1. Her diabetes is not causing protein to spill into her urine  2. Good Job  3. Repeat this test in one year.    Sent a Recall call appointment for labs. Pt understood.

## 2020-08-31 ENCOUNTER — PATIENT OUTREACH (OUTPATIENT)
Dept: ADMINISTRATIVE | Facility: OTHER | Age: 70
End: 2020-08-31

## 2020-08-31 NOTE — PROGRESS NOTES
Chart reviewed.   Immunizations: updated  Orders placed: n/a  Upcoming appts to satisfy EMIGDIO topics: Optometry 9/02

## 2020-09-02 ENCOUNTER — CLINICAL SUPPORT (OUTPATIENT)
Dept: OPHTHALMOLOGY | Facility: CLINIC | Age: 70
End: 2020-09-02
Payer: COMMERCIAL

## 2020-09-02 ENCOUNTER — OFFICE VISIT (OUTPATIENT)
Dept: OPTOMETRY | Facility: CLINIC | Age: 70
End: 2020-09-02
Payer: COMMERCIAL

## 2020-09-02 DIAGNOSIS — H25.13 NUCLEAR SCLEROSIS, BILATERAL: ICD-10-CM

## 2020-09-02 DIAGNOSIS — E11.9 DIABETIC EYE EXAM: Primary | ICD-10-CM

## 2020-09-02 DIAGNOSIS — H52.203 HYPEROPIA WITH ASTIGMATISM AND PRESBYOPIA, BILATERAL: ICD-10-CM

## 2020-09-02 DIAGNOSIS — H26.9 CORTICAL CATARACT OF BOTH EYES: ICD-10-CM

## 2020-09-02 DIAGNOSIS — H40.053 OCULAR HYPERTENSION, BILATERAL: ICD-10-CM

## 2020-09-02 DIAGNOSIS — E11.9 TYPE 2 DIABETES MELLITUS WITHOUT RETINOPATHY: ICD-10-CM

## 2020-09-02 DIAGNOSIS — H52.4 HYPEROPIA WITH ASTIGMATISM AND PRESBYOPIA, BILATERAL: ICD-10-CM

## 2020-09-02 DIAGNOSIS — H52.03 HYPEROPIA WITH ASTIGMATISM AND PRESBYOPIA, BILATERAL: ICD-10-CM

## 2020-09-02 DIAGNOSIS — Z01.00 DIABETIC EYE EXAM: Primary | ICD-10-CM

## 2020-09-02 PROCEDURE — 92015 DETERMINE REFRACTIVE STATE: CPT | Mod: S$GLB,,, | Performed by: OPTOMETRIST

## 2020-09-02 PROCEDURE — 99999 PR PBB SHADOW E&M-EST. PATIENT-LVL IV: ICD-10-PCS | Mod: PBBFAC,,, | Performed by: OPTOMETRIST

## 2020-09-02 PROCEDURE — 92014 PR EYE EXAM, EST PATIENT,COMPREHESV: ICD-10-PCS | Mod: S$GLB,,, | Performed by: OPTOMETRIST

## 2020-09-02 PROCEDURE — 92083 EXTENDED VISUAL FIELD XM: CPT | Mod: S$GLB,,, | Performed by: OPTOMETRIST

## 2020-09-02 PROCEDURE — 99999 PR PBB SHADOW E&M-EST. PATIENT-LVL IV: CPT | Mod: PBBFAC,,, | Performed by: OPTOMETRIST

## 2020-09-02 PROCEDURE — 92083 HUMPHREY VISUAL FIELD - OU - BOTH EYES: ICD-10-PCS | Mod: S$GLB,,, | Performed by: OPTOMETRIST

## 2020-09-02 PROCEDURE — 92014 COMPRE OPH EXAM EST PT 1/>: CPT | Mod: S$GLB,,, | Performed by: OPTOMETRIST

## 2020-09-02 PROCEDURE — 92015 PR REFRACTION: ICD-10-PCS | Mod: S$GLB,,, | Performed by: OPTOMETRIST

## 2020-09-02 NOTE — PATIENT INSTRUCTIONS
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    Early peripheral  cortical cataract in both eyes.  Still no need for cataract surgery in either eye.     Type 2 diabetes without evidence of diabetic retinopathy in either eye.    Prior diagnosis of ocular hypertension.  No evidence of glaucomatous changes in the appearance of the optic nerve head in either eye.  Using timolol maleate 0.5% ophthalmic solution every morning in both eyes, and Latanoprost 0.005% ophthalmic solution every evening in both eyes for IOP control.  Intraocular pressure remains high-normal/borderline in each eye today (21 mm Hg in OD and 21 mm Hg in OS)    HVF test (24-2 EMANUEL Standard) done today..     Taking HVF  results at face:        OD focal superior paracentral defect, but not overtly glaucomatous        OS Normal visual field, without evidence of visual field defect     Continue Latanoprost 0.005% every evening in both eyes, and continue Timolol Maleate 0.5% every morning in both eyes.     Hyperopia with astigmatism in each eye, with satisfactory best-corrected VA in each eye. Presbyopia consistent with age   New spectacle lens Rx issued for full-time wear.   Okay to defer filling new spectacle lens Rx if happy with VA with present glasses.   Return for recheck in September, 2021, with repeat HVF test (24-2) at that time, or prior if any problems noted in the interim.   Orders for future HVF test entered.

## 2020-09-02 NOTE — PROGRESS NOTES
HPI     Ocular Hypertension      Additional comments: Annual general eye exam and refraction.  No complaints.  Wears glasses full-time   Prior diagnosis of bilateral OHT.   Using Timolol Maleate 0.5% qAM OU and      Latanoprost 0.005% q PM OU for IOP control/reduction               Comments     Patient's age: 70 y.o. AA  female  Occupation: Housewife  Approximate date of last eye examination:  05/30/2019  Name of last eye doctor seen: Dr Aquino  City/State: [facility]  Wears glasses? Yes     If yes, wears  Full-time or part-time?  Full-time  Present glasses are: Bifocal, SV Distance, SV Reading?  Progressive lenses  Approximate age of present glasses:  3 + years old   Got new glasses following last exam, or subsequently?: No   Any problem with VA with glasses?  No problems  Wears CLs?:  No  Headaches?  No  Eye pain/discomfort?  No                                                                       Flashes?  No  Floaters?  No  Diplopia/Double vision?  No  Patient's Ocular History:         Any eye surgeries? No         Any eye injury?  No         Any treatment for eye disease?  Ocular Hypertension OU  Family history of eye disease?    Maternal Uncle + Glaucoma  Significant patient medical history:         1. Diabetes?  Yes, BS was 105 this morning       If yes, IDDM or NIDDM? NIDDM. Takes Metformin   2. HBP?  Yes - states controlled with medication               3. Other (describe):  Thyroid, high cholesterol     ! OTC eyedrops currently using:  None   ! Prescription eye meds currently using:                                 Latanoprost QHS OU                               Timolol 0.5% Gel-forming solution QAM OU   ! Any history of allergy/adverse reaction to any eye meds used   previously?  No    ! Any history of allergy/adverse reaction to eyedrops used during prior   eye exam(s)? No    ! Any history of allergy/adverse reaction to Novacaine or similar meds?   No   ! Any history of allergy/adverse reaction to  "Epinephrine or similar meds?   No    ! Patient okay with use of anesthetic eyedrops to check eye pressure?    Yes        ! Patient okay with use of eyedrops to dilate pupils today?  Yes    !  Allergies/Medications/Medical History/Family History reviewed today?    Yes    PD 69/65  Reading dist =  14.5"                               Last edited by Cesario Aquino, OD on 9/2/2020 11:03 AM. (History)            Assessment /Plan     For exam results, see Encounter Report.    1. Diabetic eye exam     2. Ocular hypertension, bilateral  [place] Visual Field - OU - Extended - Both Eyes    [place] Visual Field - OU - Extended - Both Eyes   3. Type 2 diabetes mellitus without retinopathy     4. Nuclear sclerosis, bilateral     5. Cortical cataract of both eyes     6. Hyperopia with astigmatism and presbyopia, bilateral                    Prior diagnosis of nuclear sclerosis of lens of both eyes.    Early peripheral  cortical cataract in both eyes.  Still no need for cataract surgery in either eye.    Type 2 diabetes without evidence of diabetic retinopathy in either eye.     Prior diagnosis of ocular hypertension.  No evidence of glaucomatous changes in the appearance of the optic nerve head in either eye.  Using timolol maleate 0.5% ophthalmic solution every morning in both eyes, and Latanoprost 0.005% ophthalmic solution every evening in both eyes for IOP control.  Intraocular pressure remains high-normal/borderline in each eye today (21 mm Hg in OD and 21 mm Hg in OS)    HVF test (24-2 EMANUEL Standard) done today..     Taking HVF  results at face:        OD focal superior paracentral defect, but not overtly glaucomatous        OS Normal visual field, without evidence of visual field defect     Continue Latanoprost 0.005% every evening in both eyes, and continue Timolol Maleate 0.5% every morning in both eyes.     Hyperopia with astigmatism in each eye, with satisfactory best-corrected VA in each eye. Presbyopia " consistent with age   New spectacle lens Rx issued for full-time wear.   Okay to defer filling new spectacle lens Rx if happy with VA with present glasses.   Return for recheck in September, 2021, with repeat HVF test (24-2) at that time, or prior if any problems noted in the interim.   Orders for future HVF test entered.

## 2020-09-02 NOTE — PROGRESS NOTES
Visual field test done  Pt stated no latex allergies used coverlet patch        3.50+ 3.00 x 180  3.50 + 4.50 x 010    eh

## 2020-11-10 DIAGNOSIS — I10 ESSENTIAL HYPERTENSION: ICD-10-CM

## 2020-11-10 RX ORDER — HYDROCHLOROTHIAZIDE 25 MG/1
25 TABLET ORAL DAILY
Qty: 90 TABLET | Refills: 3 | Status: SHIPPED | OUTPATIENT
Start: 2020-11-10 | End: 2021-12-27

## 2020-11-10 RX ORDER — GABAPENTIN 100 MG/1
100 CAPSULE ORAL 3 TIMES DAILY
Qty: 90 CAPSULE | Refills: 11 | Status: SHIPPED | OUTPATIENT
Start: 2020-11-10 | End: 2021-05-24 | Stop reason: SDUPTHER

## 2020-11-10 RX ORDER — AMLODIPINE BESYLATE 5 MG/1
5 TABLET ORAL DAILY
Qty: 30 TABLET | Refills: 11 | Status: SHIPPED | OUTPATIENT
Start: 2020-11-10 | End: 2021-12-27

## 2020-11-10 RX ORDER — LOSARTAN POTASSIUM 100 MG/1
100 TABLET ORAL DAILY
Qty: 90 TABLET | Refills: 3 | Status: SHIPPED | OUTPATIENT
Start: 2020-11-10 | End: 2021-12-02

## 2020-11-10 NOTE — TELEPHONE ENCOUNTER
----- Message from Nicolle Nunn sent at 11/10/2020  8:22 AM CST -----  Regarding: call back  Contact: [phone]  Patient is calling to get refills on her medication sent to [facility] [phone]    1. losartan (COZAAR) 100 MG tablet 90 tablet     2. hydroCHLOROthiazide (HYDRODIURIL) 25 MG tablet 90 tablet     3. amLODIPine (NORVASC) 5 MG tablet 90 tablet     4. gabapentin (NEURONTIN) 300 MG capsule 90 capsule

## 2021-01-20 DIAGNOSIS — Z12.31 OTHER SCREENING MAMMOGRAM: ICD-10-CM

## 2021-03-09 ENCOUNTER — TELEPHONE (OUTPATIENT)
Dept: FAMILY MEDICINE | Facility: CLINIC | Age: 71
End: 2021-03-09

## 2021-03-12 ENCOUNTER — IMMUNIZATION (OUTPATIENT)
Dept: PRIMARY CARE CLINIC | Facility: CLINIC | Age: 71
End: 2021-03-12
Payer: COMMERCIAL

## 2021-03-12 DIAGNOSIS — Z23 NEED FOR VACCINATION: Primary | ICD-10-CM

## 2021-03-12 PROCEDURE — 91300 PR SARS-COV- 2 COVID-19 VACCINE, NO PRSV, 30MCG/0.3ML, IM: ICD-10-PCS | Mod: S$GLB,,, | Performed by: INTERNAL MEDICINE

## 2021-03-12 PROCEDURE — 0001A PR IMMUNIZ ADMIN, SARS-COV-2 COVID-19 VACC, 30MCG/0.3ML, 1ST DOSE: ICD-10-PCS | Mod: CV19,S$GLB,, | Performed by: INTERNAL MEDICINE

## 2021-03-12 PROCEDURE — 0001A PR IMMUNIZ ADMIN, SARS-COV-2 COVID-19 VACC, 30MCG/0.3ML, 1ST DOSE: CPT | Mod: CV19,S$GLB,, | Performed by: INTERNAL MEDICINE

## 2021-03-12 PROCEDURE — 91300 PR SARS-COV- 2 COVID-19 VACCINE, NO PRSV, 30MCG/0.3ML, IM: CPT | Mod: S$GLB,,, | Performed by: INTERNAL MEDICINE

## 2021-03-12 RX ADMIN — Medication 0.3 ML: at 12:03

## 2021-03-18 ENCOUNTER — HOSPITAL ENCOUNTER (OUTPATIENT)
Dept: RADIOLOGY | Facility: CLINIC | Age: 71
Discharge: HOME | End: 2021-03-18
Attending: FAMILY MEDICINE
Payer: COMMERCIAL

## 2021-03-18 DIAGNOSIS — Z12.31 OTHER SCREENING MAMMOGRAM: ICD-10-CM

## 2021-03-18 PROCEDURE — 77067 SCR MAMMO BI INCL CAD: CPT | Mod: TC,PO

## 2021-03-18 PROCEDURE — 77067 MAMMO DIGITAL SCREENING BILAT WITH TOMO: ICD-10-PCS | Mod: 26,,, | Performed by: RADIOLOGY

## 2021-03-18 PROCEDURE — 77063 BREAST TOMOSYNTHESIS BI: CPT | Mod: 26,,, | Performed by: RADIOLOGY

## 2021-03-18 PROCEDURE — 77063 MAMMO DIGITAL SCREENING BILAT WITH TOMO: ICD-10-PCS | Mod: 26,,, | Performed by: RADIOLOGY

## 2021-03-18 PROCEDURE — 77067 SCR MAMMO BI INCL CAD: CPT | Mod: 26,,, | Performed by: RADIOLOGY

## 2021-04-02 ENCOUNTER — IMMUNIZATION (OUTPATIENT)
Dept: PRIMARY CARE CLINIC | Facility: CLINIC | Age: 71
End: 2021-04-02
Payer: COMMERCIAL

## 2021-04-02 DIAGNOSIS — Z23 NEED FOR VACCINATION: Primary | ICD-10-CM

## 2021-04-02 PROCEDURE — 91300 PR SARS-COV- 2 COVID-19 VACCINE, NO PRSV, 30MCG/0.3ML, IM: ICD-10-PCS | Mod: S$GLB,,, | Performed by: INTERNAL MEDICINE

## 2021-04-02 PROCEDURE — 0002A PR IMMUNIZ ADMIN, SARS-COV-2 COVID-19 VACC, 30MCG/0.3ML, 2ND DOSE: CPT | Mod: CV19,S$GLB,, | Performed by: INTERNAL MEDICINE

## 2021-04-02 PROCEDURE — 91300 PR SARS-COV- 2 COVID-19 VACCINE, NO PRSV, 30MCG/0.3ML, IM: CPT | Mod: S$GLB,,, | Performed by: INTERNAL MEDICINE

## 2021-04-02 PROCEDURE — 0002A PR IMMUNIZ ADMIN, SARS-COV-2 COVID-19 VACC, 30MCG/0.3ML, 2ND DOSE: ICD-10-PCS | Mod: CV19,S$GLB,, | Performed by: INTERNAL MEDICINE

## 2021-04-02 RX ADMIN — Medication 0.3 ML: at 02:04

## 2021-05-24 ENCOUNTER — OFFICE VISIT (OUTPATIENT)
Dept: FAMILY MEDICINE | Facility: CLINIC | Age: 71
End: 2021-05-24
Payer: COMMERCIAL

## 2021-05-24 VITALS
SYSTOLIC BLOOD PRESSURE: 134 MMHG | BODY MASS INDEX: 42.29 KG/M2 | HEART RATE: 74 BPM | OXYGEN SATURATION: 98 % | TEMPERATURE: 98 F | WEIGHT: 224 LBS | HEIGHT: 61 IN | DIASTOLIC BLOOD PRESSURE: 58 MMHG

## 2021-05-24 DIAGNOSIS — I10 ESSENTIAL HYPERTENSION: ICD-10-CM

## 2021-05-24 DIAGNOSIS — E03.9 HYPOTHYROIDISM, UNSPECIFIED TYPE: ICD-10-CM

## 2021-05-24 DIAGNOSIS — N18.30 DIABETES MELLITUS WITH STAGE 3 CHRONIC KIDNEY DISEASE: ICD-10-CM

## 2021-05-24 DIAGNOSIS — Z00.00 ROUTINE GENERAL MEDICAL EXAMINATION AT A HEALTH CARE FACILITY: Primary | ICD-10-CM

## 2021-05-24 DIAGNOSIS — E11.22 DIABETES MELLITUS WITH STAGE 3 CHRONIC KIDNEY DISEASE: ICD-10-CM

## 2021-05-24 DIAGNOSIS — R20.2 BILATERAL LEG PARESTHESIA: ICD-10-CM

## 2021-05-24 DIAGNOSIS — N18.30 STAGE 3 CHRONIC KIDNEY DISEASE, UNSPECIFIED WHETHER STAGE 3A OR 3B CKD: ICD-10-CM

## 2021-05-24 DIAGNOSIS — Z12.11 COLON CANCER SCREENING: ICD-10-CM

## 2021-05-24 PROCEDURE — 3072F LOW RISK FOR RETINOPATHY: CPT | Mod: S$GLB,,, | Performed by: FAMILY MEDICINE

## 2021-05-24 PROCEDURE — 3008F BODY MASS INDEX DOCD: CPT | Mod: CPTII,S$GLB,, | Performed by: FAMILY MEDICINE

## 2021-05-24 PROCEDURE — 1101F PT FALLS ASSESS-DOCD LE1/YR: CPT | Mod: CPTII,S$GLB,, | Performed by: FAMILY MEDICINE

## 2021-05-24 PROCEDURE — 1125F AMNT PAIN NOTED PAIN PRSNT: CPT | Mod: S$GLB,,, | Performed by: FAMILY MEDICINE

## 2021-05-24 PROCEDURE — 1125F PR PAIN SEVERITY QUANTIFIED, PAIN PRESENT: ICD-10-PCS | Mod: S$GLB,,, | Performed by: FAMILY MEDICINE

## 2021-05-24 PROCEDURE — 99397 PR PREVENTIVE VISIT,EST,65 & OVER: ICD-10-PCS | Mod: S$GLB,,, | Performed by: FAMILY MEDICINE

## 2021-05-24 PROCEDURE — 1101F PR PT FALLS ASSESS DOC 0-1 FALLS W/OUT INJ PAST YR: ICD-10-PCS | Mod: CPTII,S$GLB,, | Performed by: FAMILY MEDICINE

## 2021-05-24 PROCEDURE — 99999 PR PBB SHADOW E&M-EST. PATIENT-LVL IV: ICD-10-PCS | Mod: PBBFAC,,, | Performed by: FAMILY MEDICINE

## 2021-05-24 PROCEDURE — 3288F FALL RISK ASSESSMENT DOCD: CPT | Mod: CPTII,S$GLB,, | Performed by: FAMILY MEDICINE

## 2021-05-24 PROCEDURE — 3288F PR FALLS RISK ASSESSMENT DOCUMENTED: ICD-10-PCS | Mod: CPTII,S$GLB,, | Performed by: FAMILY MEDICINE

## 2021-05-24 PROCEDURE — 99397 PER PM REEVAL EST PAT 65+ YR: CPT | Mod: S$GLB,,, | Performed by: FAMILY MEDICINE

## 2021-05-24 PROCEDURE — 99999 PR PBB SHADOW E&M-EST. PATIENT-LVL IV: CPT | Mod: PBBFAC,,, | Performed by: FAMILY MEDICINE

## 2021-05-24 PROCEDURE — 3008F PR BODY MASS INDEX (BMI) DOCUMENTED: ICD-10-PCS | Mod: CPTII,S$GLB,, | Performed by: FAMILY MEDICINE

## 2021-05-24 PROCEDURE — 3072F PR LOW RISK FOR RETINOPATHY: ICD-10-PCS | Mod: S$GLB,,, | Performed by: FAMILY MEDICINE

## 2021-05-24 RX ORDER — GABAPENTIN 300 MG/1
300 CAPSULE ORAL 3 TIMES DAILY
Qty: 90 CAPSULE | Refills: 11 | Status: SHIPPED | OUTPATIENT
Start: 2021-05-24

## 2021-05-26 ENCOUNTER — LAB VISIT (OUTPATIENT)
Dept: LAB | Facility: HOSPITAL | Age: 71
End: 2021-05-26
Attending: FAMILY MEDICINE
Payer: COMMERCIAL

## 2021-05-26 DIAGNOSIS — N18.30 STAGE 3 CHRONIC KIDNEY DISEASE, UNSPECIFIED WHETHER STAGE 3A OR 3B CKD: ICD-10-CM

## 2021-05-26 DIAGNOSIS — E11.22 DIABETES MELLITUS WITH STAGE 3 CHRONIC KIDNEY DISEASE: ICD-10-CM

## 2021-05-26 DIAGNOSIS — N18.30 DIABETES MELLITUS WITH STAGE 3 CHRONIC KIDNEY DISEASE: ICD-10-CM

## 2021-05-26 DIAGNOSIS — Z00.00 ROUTINE GENERAL MEDICAL EXAMINATION AT A HEALTH CARE FACILITY: ICD-10-CM

## 2021-05-26 DIAGNOSIS — I10 ESSENTIAL HYPERTENSION: ICD-10-CM

## 2021-05-26 LAB
ALBUMIN SERPL BCP-MCNC: 3.4 G/DL (ref 3.5–5.2)
ALP SERPL-CCNC: 65 U/L (ref 55–135)
ALT SERPL W/O P-5'-P-CCNC: 11 U/L (ref 10–44)
ANION GAP SERPL CALC-SCNC: 8 MMOL/L (ref 8–16)
AST SERPL-CCNC: 14 U/L (ref 10–40)
BASOPHILS # BLD AUTO: 0.04 K/UL (ref 0–0.2)
BASOPHILS NFR BLD: 0.5 % (ref 0–1.9)
BILIRUB SERPL-MCNC: 0.5 MG/DL (ref 0.1–1)
BUN SERPL-MCNC: 18 MG/DL (ref 8–23)
CALCIUM SERPL-MCNC: 9.1 MG/DL (ref 8.7–10.5)
CHLORIDE SERPL-SCNC: 106 MMOL/L (ref 95–110)
CHOLEST SERPL-MCNC: 209 MG/DL (ref 120–199)
CHOLEST/HDLC SERPL: 5.8 {RATIO} (ref 2–5)
CO2 SERPL-SCNC: 25 MMOL/L (ref 23–29)
CREAT SERPL-MCNC: 1.1 MG/DL (ref 0.5–1.4)
DIFFERENTIAL METHOD: ABNORMAL
EOSINOPHIL # BLD AUTO: 0.4 K/UL (ref 0–0.5)
EOSINOPHIL NFR BLD: 4.6 % (ref 0–8)
ERYTHROCYTE [DISTWIDTH] IN BLOOD BY AUTOMATED COUNT: 14.6 % (ref 11.5–14.5)
EST. GFR  (AFRICAN AMERICAN): 59.6 ML/MIN/1.73 M^2
EST. GFR  (NON AFRICAN AMERICAN): 51.7 ML/MIN/1.73 M^2
ESTIMATED AVG GLUCOSE: 123 MG/DL (ref 68–131)
GLUCOSE SERPL-MCNC: 115 MG/DL (ref 70–110)
HBA1C MFR BLD: 5.9 % (ref 4–5.6)
HCT VFR BLD AUTO: 42.1 % (ref 37–48.5)
HDLC SERPL-MCNC: 36 MG/DL (ref 40–75)
HDLC SERPL: 17.2 % (ref 20–50)
HGB BLD-MCNC: 13.4 G/DL (ref 12–16)
IMM GRANULOCYTES # BLD AUTO: 0.03 K/UL (ref 0–0.04)
IMM GRANULOCYTES NFR BLD AUTO: 0.3 % (ref 0–0.5)
LDLC SERPL CALC-MCNC: 146 MG/DL (ref 63–159)
LYMPHOCYTES # BLD AUTO: 2.8 K/UL (ref 1–4.8)
LYMPHOCYTES NFR BLD: 32.2 % (ref 18–48)
MCH RBC QN AUTO: 29 PG (ref 27–31)
MCHC RBC AUTO-ENTMCNC: 31.8 G/DL (ref 32–36)
MCV RBC AUTO: 91 FL (ref 82–98)
MONOCYTES # BLD AUTO: 0.7 K/UL (ref 0.3–1)
MONOCYTES NFR BLD: 8.4 % (ref 4–15)
NEUTROPHILS # BLD AUTO: 4.7 K/UL (ref 1.8–7.7)
NEUTROPHILS NFR BLD: 54 % (ref 38–73)
NONHDLC SERPL-MCNC: 173 MG/DL
NRBC BLD-RTO: 0 /100 WBC
PLATELET # BLD AUTO: 158 K/UL (ref 150–450)
PMV BLD AUTO: 13 FL (ref 9.2–12.9)
POTASSIUM SERPL-SCNC: 3.7 MMOL/L (ref 3.5–5.1)
PROT SERPL-MCNC: 6.9 G/DL (ref 6–8.4)
RBC # BLD AUTO: 4.62 M/UL (ref 4–5.4)
SODIUM SERPL-SCNC: 139 MMOL/L (ref 136–145)
T4 FREE SERPL-MCNC: 0.65 NG/DL (ref 0.71–1.51)
TRIGL SERPL-MCNC: 135 MG/DL (ref 30–150)
TSH SERPL DL<=0.005 MIU/L-ACNC: 26.71 UIU/ML (ref 0.4–4)
WBC # BLD AUTO: 8.77 K/UL (ref 3.9–12.7)

## 2021-05-26 PROCEDURE — 84443 ASSAY THYROID STIM HORMONE: CPT | Performed by: FAMILY MEDICINE

## 2021-05-26 PROCEDURE — 85025 COMPLETE CBC W/AUTO DIFF WBC: CPT | Performed by: FAMILY MEDICINE

## 2021-05-26 PROCEDURE — 36415 COLL VENOUS BLD VENIPUNCTURE: CPT | Mod: PO | Performed by: FAMILY MEDICINE

## 2021-05-26 PROCEDURE — 83036 HEMOGLOBIN GLYCOSYLATED A1C: CPT | Performed by: FAMILY MEDICINE

## 2021-05-26 PROCEDURE — 80061 LIPID PANEL: CPT | Performed by: FAMILY MEDICINE

## 2021-05-26 PROCEDURE — 80053 COMPREHEN METABOLIC PANEL: CPT | Performed by: FAMILY MEDICINE

## 2021-05-26 PROCEDURE — 84439 ASSAY OF FREE THYROXINE: CPT | Performed by: FAMILY MEDICINE

## 2021-05-31 ENCOUNTER — TELEPHONE (OUTPATIENT)
Dept: FAMILY MEDICINE | Facility: CLINIC | Age: 71
End: 2021-05-31

## 2021-05-31 DIAGNOSIS — E89.0 POSTABLATIVE HYPOTHYROIDISM: ICD-10-CM

## 2021-06-01 RX ORDER — LEVOTHYROXINE SODIUM 137 UG/1
137 TABLET ORAL DAILY
Qty: 90 TABLET | Refills: 3 | Status: SHIPPED | OUTPATIENT
Start: 2021-06-01 | End: 2021-08-27 | Stop reason: SDUPTHER

## 2021-06-03 ENCOUNTER — PATIENT OUTREACH (OUTPATIENT)
Dept: ADMINISTRATIVE | Facility: OTHER | Age: 71
End: 2021-06-03

## 2021-06-08 DIAGNOSIS — E89.0 POSTABLATIVE HYPOTHYROIDISM: ICD-10-CM

## 2021-06-08 RX ORDER — LEVOTHYROXINE SODIUM 137 UG/1
137 TABLET ORAL DAILY
Qty: 90 TABLET | Refills: 3 | OUTPATIENT
Start: 2021-06-08

## 2021-08-17 ENCOUNTER — OFFICE VISIT (OUTPATIENT)
Dept: ENDOCRINOLOGY | Facility: CLINIC | Age: 71
End: 2021-08-17
Payer: COMMERCIAL

## 2021-08-17 ENCOUNTER — LAB VISIT (OUTPATIENT)
Dept: LAB | Facility: HOSPITAL | Age: 71
End: 2021-08-17
Payer: COMMERCIAL

## 2021-08-17 VITALS
SYSTOLIC BLOOD PRESSURE: 122 MMHG | DIASTOLIC BLOOD PRESSURE: 70 MMHG | BODY MASS INDEX: 40.29 KG/M2 | WEIGHT: 213.38 LBS | HEIGHT: 61 IN

## 2021-08-17 DIAGNOSIS — E78.2 MIXED HYPERLIPIDEMIA: ICD-10-CM

## 2021-08-17 DIAGNOSIS — E89.0 POSTABLATIVE HYPOTHYROIDISM: ICD-10-CM

## 2021-08-17 DIAGNOSIS — E66.01 MORBID OBESITY: ICD-10-CM

## 2021-08-17 DIAGNOSIS — E11.9 TYPE 2 DIABETES MELLITUS WITHOUT COMPLICATION, WITHOUT LONG-TERM CURRENT USE OF INSULIN: ICD-10-CM

## 2021-08-17 DIAGNOSIS — M85.80 OSTEOPENIA, UNSPECIFIED LOCATION: ICD-10-CM

## 2021-08-17 DIAGNOSIS — E55.9 VITAMIN D DEFICIENCY: ICD-10-CM

## 2021-08-17 DIAGNOSIS — E89.0 POSTABLATIVE HYPOTHYROIDISM: Primary | ICD-10-CM

## 2021-08-17 DIAGNOSIS — I10 ESSENTIAL HYPERTENSION: ICD-10-CM

## 2021-08-17 DIAGNOSIS — N18.30 STAGE 3 CHRONIC KIDNEY DISEASE, UNSPECIFIED WHETHER STAGE 3A OR 3B CKD: ICD-10-CM

## 2021-08-17 LAB — TSH SERPL DL<=0.005 MIU/L-ACNC: 0.59 UIU/ML (ref 0.4–4)

## 2021-08-17 PROCEDURE — 99214 PR OFFICE/OUTPT VISIT, EST, LEVL IV, 30-39 MIN: ICD-10-PCS | Mod: S$GLB,,, | Performed by: NURSE PRACTITIONER

## 2021-08-17 PROCEDURE — 99999 PR PBB SHADOW E&M-EST. PATIENT-LVL III: CPT | Mod: PBBFAC,,, | Performed by: NURSE PRACTITIONER

## 2021-08-17 PROCEDURE — 3044F PR MOST RECENT HEMOGLOBIN A1C LEVEL <7.0%: ICD-10-PCS | Mod: CPTII,S$GLB,, | Performed by: NURSE PRACTITIONER

## 2021-08-17 PROCEDURE — 3288F PR FALLS RISK ASSESSMENT DOCUMENTED: ICD-10-PCS | Mod: CPTII,S$GLB,, | Performed by: NURSE PRACTITIONER

## 2021-08-17 PROCEDURE — 3078F DIAST BP <80 MM HG: CPT | Mod: CPTII,S$GLB,, | Performed by: NURSE PRACTITIONER

## 2021-08-17 PROCEDURE — 3072F PR LOW RISK FOR RETINOPATHY: ICD-10-PCS | Mod: CPTII,S$GLB,, | Performed by: NURSE PRACTITIONER

## 2021-08-17 PROCEDURE — 3288F FALL RISK ASSESSMENT DOCD: CPT | Mod: CPTII,S$GLB,, | Performed by: NURSE PRACTITIONER

## 2021-08-17 PROCEDURE — 3074F PR MOST RECENT SYSTOLIC BLOOD PRESSURE < 130 MM HG: ICD-10-PCS | Mod: CPTII,S$GLB,, | Performed by: NURSE PRACTITIONER

## 2021-08-17 PROCEDURE — 3008F BODY MASS INDEX DOCD: CPT | Mod: CPTII,S$GLB,, | Performed by: NURSE PRACTITIONER

## 2021-08-17 PROCEDURE — 1160F PR REVIEW ALL MEDS BY PRESCRIBER/CLIN PHARMACIST DOCUMENTED: ICD-10-PCS | Mod: CPTII,S$GLB,, | Performed by: NURSE PRACTITIONER

## 2021-08-17 PROCEDURE — 3074F SYST BP LT 130 MM HG: CPT | Mod: CPTII,S$GLB,, | Performed by: NURSE PRACTITIONER

## 2021-08-17 PROCEDURE — 84443 ASSAY THYROID STIM HORMONE: CPT | Performed by: NURSE PRACTITIONER

## 2021-08-17 PROCEDURE — 1160F RVW MEDS BY RX/DR IN RCRD: CPT | Mod: CPTII,S$GLB,, | Performed by: NURSE PRACTITIONER

## 2021-08-17 PROCEDURE — 3008F PR BODY MASS INDEX (BMI) DOCUMENTED: ICD-10-PCS | Mod: CPTII,S$GLB,, | Performed by: NURSE PRACTITIONER

## 2021-08-17 PROCEDURE — 99999 PR PBB SHADOW E&M-EST. PATIENT-LVL III: ICD-10-PCS | Mod: PBBFAC,,, | Performed by: NURSE PRACTITIONER

## 2021-08-17 PROCEDURE — 99214 OFFICE O/P EST MOD 30 MIN: CPT | Mod: S$GLB,,, | Performed by: NURSE PRACTITIONER

## 2021-08-17 PROCEDURE — 1126F PR PAIN SEVERITY QUANTIFIED, NO PAIN PRESENT: ICD-10-PCS | Mod: CPTII,S$GLB,, | Performed by: NURSE PRACTITIONER

## 2021-08-17 PROCEDURE — 1159F MED LIST DOCD IN RCRD: CPT | Mod: CPTII,S$GLB,, | Performed by: NURSE PRACTITIONER

## 2021-08-17 PROCEDURE — 3044F HG A1C LEVEL LT 7.0%: CPT | Mod: CPTII,S$GLB,, | Performed by: NURSE PRACTITIONER

## 2021-08-17 PROCEDURE — 36415 COLL VENOUS BLD VENIPUNCTURE: CPT | Performed by: NURSE PRACTITIONER

## 2021-08-17 PROCEDURE — 1126F AMNT PAIN NOTED NONE PRSNT: CPT | Mod: CPTII,S$GLB,, | Performed by: NURSE PRACTITIONER

## 2021-08-17 PROCEDURE — 1101F PR PT FALLS ASSESS DOC 0-1 FALLS W/OUT INJ PAST YR: ICD-10-PCS | Mod: CPTII,S$GLB,, | Performed by: NURSE PRACTITIONER

## 2021-08-17 PROCEDURE — 3072F LOW RISK FOR RETINOPATHY: CPT | Mod: CPTII,S$GLB,, | Performed by: NURSE PRACTITIONER

## 2021-08-17 PROCEDURE — 1101F PT FALLS ASSESS-DOCD LE1/YR: CPT | Mod: CPTII,S$GLB,, | Performed by: NURSE PRACTITIONER

## 2021-08-17 PROCEDURE — 3078F PR MOST RECENT DIASTOLIC BLOOD PRESSURE < 80 MM HG: ICD-10-PCS | Mod: CPTII,S$GLB,, | Performed by: NURSE PRACTITIONER

## 2021-08-17 PROCEDURE — 1159F PR MEDICATION LIST DOCUMENTED IN MEDICAL RECORD: ICD-10-PCS | Mod: CPTII,S$GLB,, | Performed by: NURSE PRACTITIONER

## 2021-08-17 RX ORDER — LANCETS
EACH MISCELLANEOUS
Qty: 100 EACH | Refills: 0 | Status: SHIPPED | OUTPATIENT
Start: 2021-08-17

## 2021-08-17 RX ORDER — INSULIN PUMP SYRINGE, 3 ML
EACH MISCELLANEOUS
Qty: 1 EACH | Refills: 0 | Status: SHIPPED | OUTPATIENT
Start: 2021-08-17

## 2021-08-27 DIAGNOSIS — E89.0 POSTABLATIVE HYPOTHYROIDISM: ICD-10-CM

## 2021-08-27 RX ORDER — LEVOTHYROXINE SODIUM 137 UG/1
137 TABLET ORAL DAILY
Qty: 90 TABLET | Refills: 3 | Status: SHIPPED | OUTPATIENT
Start: 2021-08-27 | End: 2021-09-29

## 2021-09-01 ENCOUNTER — LAB VISIT (OUTPATIENT)
Dept: LAB | Facility: HOSPITAL | Age: 71
End: 2021-09-01
Attending: FAMILY MEDICINE
Payer: COMMERCIAL

## 2021-09-01 DIAGNOSIS — E89.0 POSTABLATIVE HYPOTHYROIDISM: ICD-10-CM

## 2021-09-01 LAB
ALBUMIN SERPL BCP-MCNC: 3.6 G/DL (ref 3.5–5.2)
ALP SERPL-CCNC: 75 U/L (ref 55–135)
ALT SERPL W/O P-5'-P-CCNC: 12 U/L (ref 10–44)
ANION GAP SERPL CALC-SCNC: 13 MMOL/L (ref 8–16)
AST SERPL-CCNC: 16 U/L (ref 10–40)
BILIRUB SERPL-MCNC: 0.7 MG/DL (ref 0.1–1)
BUN SERPL-MCNC: 20 MG/DL (ref 8–23)
CALCIUM SERPL-MCNC: 9.9 MG/DL (ref 8.7–10.5)
CHLORIDE SERPL-SCNC: 104 MMOL/L (ref 95–110)
CHOLEST SERPL-MCNC: 91 MG/DL (ref 120–199)
CHOLEST/HDLC SERPL: 2.8 {RATIO} (ref 2–5)
CO2 SERPL-SCNC: 22 MMOL/L (ref 23–29)
CREAT SERPL-MCNC: 1.5 MG/DL (ref 0.5–1.4)
EST. GFR  (AFRICAN AMERICAN): 40.7 ML/MIN/1.73 M^2
EST. GFR  (NON AFRICAN AMERICAN): 35.3 ML/MIN/1.73 M^2
ESTIMATED AVG GLUCOSE: 128 MG/DL (ref 68–131)
GLUCOSE SERPL-MCNC: 99 MG/DL (ref 70–110)
HBA1C MFR BLD: 6.1 % (ref 4–5.6)
HDLC SERPL-MCNC: 32 MG/DL (ref 40–75)
HDLC SERPL: 35.2 % (ref 20–50)
LDLC SERPL CALC-MCNC: 40.2 MG/DL (ref 63–159)
NONHDLC SERPL-MCNC: 59 MG/DL
POTASSIUM SERPL-SCNC: 3.6 MMOL/L (ref 3.5–5.1)
PROT SERPL-MCNC: 7.8 G/DL (ref 6–8.4)
SODIUM SERPL-SCNC: 139 MMOL/L (ref 136–145)
T4 FREE SERPL-MCNC: 1.25 NG/DL (ref 0.71–1.51)
TRIGL SERPL-MCNC: 94 MG/DL (ref 30–150)
TSH SERPL DL<=0.005 MIU/L-ACNC: 0.13 UIU/ML (ref 0.4–4)

## 2021-09-01 PROCEDURE — 83036 HEMOGLOBIN GLYCOSYLATED A1C: CPT | Performed by: FAMILY MEDICINE

## 2021-09-01 PROCEDURE — 80061 LIPID PANEL: CPT | Performed by: FAMILY MEDICINE

## 2021-09-01 PROCEDURE — 80053 COMPREHEN METABOLIC PANEL: CPT | Performed by: FAMILY MEDICINE

## 2021-09-01 PROCEDURE — 36415 COLL VENOUS BLD VENIPUNCTURE: CPT | Mod: PO | Performed by: FAMILY MEDICINE

## 2021-09-01 PROCEDURE — 84439 ASSAY OF FREE THYROXINE: CPT | Performed by: FAMILY MEDICINE

## 2021-09-01 PROCEDURE — 84443 ASSAY THYROID STIM HORMONE: CPT | Performed by: FAMILY MEDICINE

## 2021-09-23 ENCOUNTER — TELEPHONE (OUTPATIENT)
Dept: OPHTHALMOLOGY | Facility: CLINIC | Age: 71
End: 2021-09-23

## 2021-09-28 ENCOUNTER — LAB VISIT (OUTPATIENT)
Dept: LAB | Facility: HOSPITAL | Age: 71
End: 2021-09-28
Attending: NURSE PRACTITIONER
Payer: COMMERCIAL

## 2021-09-28 DIAGNOSIS — E89.0 POSTABLATIVE HYPOTHYROIDISM: ICD-10-CM

## 2021-09-28 LAB
T4 FREE SERPL-MCNC: 1.26 NG/DL (ref 0.71–1.51)
TSH SERPL DL<=0.005 MIU/L-ACNC: 0.1 UIU/ML (ref 0.4–4)

## 2021-09-28 PROCEDURE — 36415 COLL VENOUS BLD VENIPUNCTURE: CPT | Mod: PO | Performed by: NURSE PRACTITIONER

## 2021-09-28 PROCEDURE — 84439 ASSAY OF FREE THYROXINE: CPT | Performed by: NURSE PRACTITIONER

## 2021-09-28 PROCEDURE — 84443 ASSAY THYROID STIM HORMONE: CPT | Performed by: NURSE PRACTITIONER

## 2021-09-29 DIAGNOSIS — E89.0 POSTABLATIVE HYPOTHYROIDISM: ICD-10-CM

## 2021-09-29 RX ORDER — LEVOTHYROXINE SODIUM 137 UG/1
TABLET ORAL
Qty: 90 TABLET | Refills: 3 | Status: SHIPPED | OUTPATIENT
Start: 2021-09-29 | End: 2021-11-11

## 2021-11-10 ENCOUNTER — LAB VISIT (OUTPATIENT)
Dept: LAB | Facility: HOSPITAL | Age: 71
End: 2021-11-10
Attending: NURSE PRACTITIONER
Payer: COMMERCIAL

## 2021-11-10 DIAGNOSIS — E89.0 POSTABLATIVE HYPOTHYROIDISM: ICD-10-CM

## 2021-11-10 LAB
T4 FREE SERPL-MCNC: 1.24 NG/DL (ref 0.71–1.51)
TSH SERPL DL<=0.005 MIU/L-ACNC: 0.12 UIU/ML (ref 0.4–4)

## 2021-11-10 PROCEDURE — 84439 ASSAY OF FREE THYROXINE: CPT | Performed by: NURSE PRACTITIONER

## 2021-11-10 PROCEDURE — 36415 COLL VENOUS BLD VENIPUNCTURE: CPT | Mod: PO | Performed by: NURSE PRACTITIONER

## 2021-11-10 PROCEDURE — 84443 ASSAY THYROID STIM HORMONE: CPT | Performed by: NURSE PRACTITIONER

## 2021-11-11 DIAGNOSIS — E89.0 POSTABLATIVE HYPOTHYROIDISM: Primary | ICD-10-CM

## 2021-11-11 RX ORDER — LEVOTHYROXINE SODIUM 112 UG/1
112 TABLET ORAL
Qty: 30 TABLET | Refills: 11 | Status: SHIPPED | OUTPATIENT
Start: 2021-11-11 | End: 2022-06-09

## 2021-11-16 ENCOUNTER — PATIENT OUTREACH (OUTPATIENT)
Dept: ADMINISTRATIVE | Facility: OTHER | Age: 71
End: 2021-11-16
Payer: COMMERCIAL

## 2021-11-17 ENCOUNTER — OFFICE VISIT (OUTPATIENT)
Dept: OPTOMETRY | Facility: CLINIC | Age: 71
End: 2021-11-17
Payer: COMMERCIAL

## 2021-11-17 ENCOUNTER — CLINICAL SUPPORT (OUTPATIENT)
Dept: OPHTHALMOLOGY | Facility: CLINIC | Age: 71
End: 2021-11-17
Payer: COMMERCIAL

## 2021-11-17 DIAGNOSIS — H52.203 HYPEROPIA WITH ASTIGMATISM AND PRESBYOPIA, BILATERAL: ICD-10-CM

## 2021-11-17 DIAGNOSIS — H25.13 NUCLEAR SCLEROSIS, BILATERAL: ICD-10-CM

## 2021-11-17 DIAGNOSIS — H26.9 CORTICAL CATARACT OF BOTH EYES: ICD-10-CM

## 2021-11-17 DIAGNOSIS — Z01.00 DIABETIC EYE EXAM: Primary | ICD-10-CM

## 2021-11-17 DIAGNOSIS — H52.4 HYPEROPIA WITH ASTIGMATISM AND PRESBYOPIA, BILATERAL: ICD-10-CM

## 2021-11-17 DIAGNOSIS — H40.053 OCULAR HYPERTENSION, BILATERAL: ICD-10-CM

## 2021-11-17 DIAGNOSIS — H52.03 HYPEROPIA WITH ASTIGMATISM AND PRESBYOPIA, BILATERAL: ICD-10-CM

## 2021-11-17 DIAGNOSIS — E11.9 DIABETIC EYE EXAM: Primary | ICD-10-CM

## 2021-11-17 DIAGNOSIS — E11.9 TYPE 2 DIABETES MELLITUS WITHOUT RETINOPATHY: ICD-10-CM

## 2021-11-17 PROCEDURE — 2023F DILAT RTA XM W/O RTNOPTHY: CPT | Mod: CPTII,S$GLB,, | Performed by: OPTOMETRIST

## 2021-11-17 PROCEDURE — 2023F PR DILATED RETINAL EXAM W/O EVID OF RETINOPATHY: ICD-10-PCS | Mod: CPTII,S$GLB,, | Performed by: OPTOMETRIST

## 2021-11-17 PROCEDURE — 92015 DETERMINE REFRACTIVE STATE: CPT | Mod: S$GLB,,, | Performed by: OPTOMETRIST

## 2021-11-17 PROCEDURE — 3061F NEG MICROALBUMINURIA REV: CPT | Mod: CPTII,S$GLB,, | Performed by: OPTOMETRIST

## 2021-11-17 PROCEDURE — 92014 PR EYE EXAM, EST PATIENT,COMPREHESV: ICD-10-PCS | Mod: S$GLB,,, | Performed by: OPTOMETRIST

## 2021-11-17 PROCEDURE — 3061F PR NEG MICROALBUMINURIA RESULT DOCUMENTED/REVIEW: ICD-10-PCS | Mod: CPTII,S$GLB,, | Performed by: OPTOMETRIST

## 2021-11-17 PROCEDURE — 3288F FALL RISK ASSESSMENT DOCD: CPT | Mod: CPTII,S$GLB,, | Performed by: OPTOMETRIST

## 2021-11-17 PROCEDURE — 92015 PR REFRACTION: ICD-10-PCS | Mod: S$GLB,,, | Performed by: OPTOMETRIST

## 2021-11-17 PROCEDURE — 3044F PR MOST RECENT HEMOGLOBIN A1C LEVEL <7.0%: ICD-10-PCS | Mod: CPTII,S$GLB,, | Performed by: OPTOMETRIST

## 2021-11-17 PROCEDURE — 99999 PR PBB SHADOW E&M-EST. PATIENT-LVL III: CPT | Mod: PBBFAC,,, | Performed by: OPTOMETRIST

## 2021-11-17 PROCEDURE — 1101F PT FALLS ASSESS-DOCD LE1/YR: CPT | Mod: CPTII,S$GLB,, | Performed by: OPTOMETRIST

## 2021-11-17 PROCEDURE — 3066F PR DOCUMENTATION OF TREATMENT FOR NEPHROPATHY: ICD-10-PCS | Mod: CPTII,S$GLB,, | Performed by: OPTOMETRIST

## 2021-11-17 PROCEDURE — 99999 PR PBB SHADOW E&M-EST. PATIENT-LVL III: ICD-10-PCS | Mod: PBBFAC,,, | Performed by: OPTOMETRIST

## 2021-11-17 PROCEDURE — 3288F PR FALLS RISK ASSESSMENT DOCUMENTED: ICD-10-PCS | Mod: CPTII,S$GLB,, | Performed by: OPTOMETRIST

## 2021-11-17 PROCEDURE — 1159F PR MEDICATION LIST DOCUMENTED IN MEDICAL RECORD: ICD-10-PCS | Mod: CPTII,S$GLB,, | Performed by: OPTOMETRIST

## 2021-11-17 PROCEDURE — 4010F ACE/ARB THERAPY RXD/TAKEN: CPT | Mod: CPTII,S$GLB,, | Performed by: OPTOMETRIST

## 2021-11-17 PROCEDURE — 92083 EXTENDED VISUAL FIELD XM: CPT | Mod: S$GLB,,, | Performed by: OPTOMETRIST

## 2021-11-17 PROCEDURE — 92083 HUMPHREY VISUAL FIELD - OU - BOTH EYES: ICD-10-PCS | Mod: S$GLB,,, | Performed by: OPTOMETRIST

## 2021-11-17 PROCEDURE — 3044F HG A1C LEVEL LT 7.0%: CPT | Mod: CPTII,S$GLB,, | Performed by: OPTOMETRIST

## 2021-11-17 PROCEDURE — 92014 COMPRE OPH EXAM EST PT 1/>: CPT | Mod: S$GLB,,, | Performed by: OPTOMETRIST

## 2021-11-17 PROCEDURE — 4010F PR ACE/ARB THEARPY RXD/TAKEN: ICD-10-PCS | Mod: CPTII,S$GLB,, | Performed by: OPTOMETRIST

## 2021-11-17 PROCEDURE — 3066F NEPHROPATHY DOC TX: CPT | Mod: CPTII,S$GLB,, | Performed by: OPTOMETRIST

## 2021-11-17 PROCEDURE — 1101F PR PT FALLS ASSESS DOC 0-1 FALLS W/OUT INJ PAST YR: ICD-10-PCS | Mod: CPTII,S$GLB,, | Performed by: OPTOMETRIST

## 2021-11-17 PROCEDURE — 1159F MED LIST DOCD IN RCRD: CPT | Mod: CPTII,S$GLB,, | Performed by: OPTOMETRIST

## 2021-12-01 DIAGNOSIS — N18.9 CHRONIC KIDNEY DISEASE, UNSPECIFIED CKD STAGE: ICD-10-CM

## 2021-12-23 ENCOUNTER — LAB VISIT (OUTPATIENT)
Dept: LAB | Facility: HOSPITAL | Age: 71
End: 2021-12-23
Attending: NURSE PRACTITIONER
Payer: COMMERCIAL

## 2021-12-23 DIAGNOSIS — E89.0 POSTABLATIVE HYPOTHYROIDISM: ICD-10-CM

## 2021-12-23 LAB
T4 FREE SERPL-MCNC: 1.49 NG/DL (ref 0.71–1.51)
TSH SERPL DL<=0.005 MIU/L-ACNC: 0.03 UIU/ML (ref 0.4–4)

## 2021-12-23 PROCEDURE — 84443 ASSAY THYROID STIM HORMONE: CPT | Performed by: NURSE PRACTITIONER

## 2021-12-23 PROCEDURE — 84439 ASSAY OF FREE THYROXINE: CPT | Performed by: NURSE PRACTITIONER

## 2021-12-23 PROCEDURE — 36415 COLL VENOUS BLD VENIPUNCTURE: CPT | Mod: PO | Performed by: NURSE PRACTITIONER

## 2021-12-30 ENCOUNTER — TELEPHONE (OUTPATIENT)
Dept: ENDOCRINOLOGY | Facility: CLINIC | Age: 71
End: 2021-12-30
Payer: COMMERCIAL

## 2022-03-07 DIAGNOSIS — I10 ESSENTIAL HYPERTENSION: ICD-10-CM

## 2022-03-07 RX ORDER — LOSARTAN POTASSIUM 100 MG/1
TABLET ORAL
Qty: 90 TABLET | Refills: 0 | Status: SHIPPED | OUTPATIENT
Start: 2022-03-07 | End: 2022-05-30

## 2022-03-07 NOTE — TELEPHONE ENCOUNTER
Care Due:                  Date            Visit Type   Department     Provider  --------------------------------------------------------------------------------                                EP -                              PRIMARY      [facility] FAMILY  Last Visit: 05-      CARE ([facility])   MEDICINE       Lenny Javier  Next Visit: None Scheduled  None         None Found                                                            Last  Test          Frequency    Reason                     Performed    Due Date  --------------------------------------------------------------------------------    Office Visit  12 months..  hydroCHLOROthiazide,       05- 05-                             losartan.................    Powered by [employer]. Reference number: [id number].   3/07/2022 10:50:24 AM CST

## 2022-03-09 DIAGNOSIS — E11.9 TYPE 2 DIABETES MELLITUS WITHOUT COMPLICATION: ICD-10-CM

## 2022-03-22 ENCOUNTER — TELEPHONE (OUTPATIENT)
Dept: FAMILY MEDICINE | Facility: CLINIC | Age: 72
End: 2022-03-22
Payer: COMMERCIAL

## 2022-03-22 DIAGNOSIS — Z12.31 ENCOUNTER FOR SCREENING MAMMOGRAM FOR BREAST CANCER: Primary | ICD-10-CM

## 2022-03-22 NOTE — TELEPHONE ENCOUNTER
----- Message from Orestes Martínez sent at 3/22/2022  1:57 PM CDT -----  Type:  Patient Requesting Call Back    Who Called:Hunter Cisneros  Would the patient rather a call back or a response via [facility]? Call back  Best Call Back Number:[phone]  Additional Information: Patient Requesting Call Back regarding orders to be uploaded for a mammogram.

## 2022-03-24 ENCOUNTER — HOSPITAL ENCOUNTER (OUTPATIENT)
Dept: RADIOLOGY | Facility: HOSPITAL | Age: 72
Discharge: HOME | End: 2022-03-24
Attending: FAMILY MEDICINE
Payer: COMMERCIAL

## 2022-03-24 VITALS — HEIGHT: 61 IN | WEIGHT: 213.38 LBS | BODY MASS INDEX: 40.29 KG/M2

## 2022-03-24 DIAGNOSIS — Z12.31 ENCOUNTER FOR SCREENING MAMMOGRAM FOR BREAST CANCER: ICD-10-CM

## 2022-03-24 PROCEDURE — 77067 SCR MAMMO BI INCL CAD: CPT | Mod: TC,PO

## 2022-05-12 ENCOUNTER — PATIENT OUTREACH (OUTPATIENT)
Dept: ADMINISTRATIVE | Facility: HOSPITAL | Age: 72
End: 2022-05-12
Payer: COMMERCIAL

## 2022-05-12 NOTE — PROGRESS NOTES
2022 Care Everywhere updates requested and reviewed.  Immunizations reconciled. Media reports reviewed.  Duplicate HM overrides and  orders removed.  Overdue HM topic chart audit and/or requested.  Overdue lab testing linked to upcoming lab appointments if applies.             Health Maintenance Due   Topic Date Due    Colorectal Cancer Screening  2021    Hemoglobin A1c  2022    COVID-19 Vaccine (4 - Booster for Pfizer series) 2022    Diabetes Urine Screening  2022

## 2022-05-26 ENCOUNTER — OFFICE VISIT (OUTPATIENT)
Dept: FAMILY MEDICINE | Facility: CLINIC | Age: 72
End: 2022-05-26
Payer: COMMERCIAL

## 2022-05-26 VITALS
WEIGHT: 184.5 LBS | DIASTOLIC BLOOD PRESSURE: 56 MMHG | BODY MASS INDEX: 34.83 KG/M2 | HEIGHT: 61 IN | TEMPERATURE: 98 F | HEART RATE: 69 BPM | OXYGEN SATURATION: 100 % | SYSTOLIC BLOOD PRESSURE: 138 MMHG

## 2022-05-26 DIAGNOSIS — Z12.11 COLON CANCER SCREENING: ICD-10-CM

## 2022-05-26 DIAGNOSIS — N18.30 DIABETES MELLITUS WITH STAGE 3 CHRONIC KIDNEY DISEASE: ICD-10-CM

## 2022-05-26 DIAGNOSIS — Z00.00 ROUTINE GENERAL MEDICAL EXAMINATION AT A HEALTH CARE FACILITY: Primary | ICD-10-CM

## 2022-05-26 DIAGNOSIS — E11.22 DIABETES MELLITUS WITH STAGE 3 CHRONIC KIDNEY DISEASE: ICD-10-CM

## 2022-05-26 DIAGNOSIS — N18.30 STAGE 3 CHRONIC KIDNEY DISEASE, UNSPECIFIED WHETHER STAGE 3A OR 3B CKD: ICD-10-CM

## 2022-05-26 DIAGNOSIS — E89.0 POSTABLATIVE HYPOTHYROIDISM: ICD-10-CM

## 2022-05-26 PROCEDURE — 3008F BODY MASS INDEX DOCD: CPT | Mod: CPTII,S$GLB,, | Performed by: FAMILY MEDICINE

## 2022-05-26 PROCEDURE — 99999 PR PBB SHADOW E&M-EST. PATIENT-LVL IV: ICD-10-PCS | Mod: PBBFAC,,, | Performed by: FAMILY MEDICINE

## 2022-05-26 PROCEDURE — 1126F AMNT PAIN NOTED NONE PRSNT: CPT | Mod: CPTII,S$GLB,, | Performed by: FAMILY MEDICINE

## 2022-05-26 PROCEDURE — 1101F PT FALLS ASSESS-DOCD LE1/YR: CPT | Mod: CPTII,S$GLB,, | Performed by: FAMILY MEDICINE

## 2022-05-26 PROCEDURE — 3075F SYST BP GE 130 - 139MM HG: CPT | Mod: CPTII,S$GLB,, | Performed by: FAMILY MEDICINE

## 2022-05-26 PROCEDURE — 4010F ACE/ARB THERAPY RXD/TAKEN: CPT | Mod: CPTII,S$GLB,, | Performed by: FAMILY MEDICINE

## 2022-05-26 PROCEDURE — 3072F PR LOW RISK FOR RETINOPATHY: ICD-10-PCS | Mod: CPTII,S$GLB,, | Performed by: FAMILY MEDICINE

## 2022-05-26 PROCEDURE — 99999 PR PBB SHADOW E&M-EST. PATIENT-LVL IV: CPT | Mod: PBBFAC,,, | Performed by: FAMILY MEDICINE

## 2022-05-26 PROCEDURE — 3288F FALL RISK ASSESSMENT DOCD: CPT | Mod: CPTII,S$GLB,, | Performed by: FAMILY MEDICINE

## 2022-05-26 PROCEDURE — 99397 PR PREVENTIVE VISIT,EST,65 & OVER: ICD-10-PCS | Mod: S$GLB,,, | Performed by: FAMILY MEDICINE

## 2022-05-26 PROCEDURE — 1101F PR PT FALLS ASSESS DOC 0-1 FALLS W/OUT INJ PAST YR: ICD-10-PCS | Mod: CPTII,S$GLB,, | Performed by: FAMILY MEDICINE

## 2022-05-26 PROCEDURE — 3288F PR FALLS RISK ASSESSMENT DOCUMENTED: ICD-10-PCS | Mod: CPTII,S$GLB,, | Performed by: FAMILY MEDICINE

## 2022-05-26 PROCEDURE — 99397 PER PM REEVAL EST PAT 65+ YR: CPT | Mod: S$GLB,,, | Performed by: FAMILY MEDICINE

## 2022-05-26 PROCEDURE — 4010F PR ACE/ARB THEARPY RXD/TAKEN: ICD-10-PCS | Mod: CPTII,S$GLB,, | Performed by: FAMILY MEDICINE

## 2022-05-26 PROCEDURE — 1159F PR MEDICATION LIST DOCUMENTED IN MEDICAL RECORD: ICD-10-PCS | Mod: CPTII,S$GLB,, | Performed by: FAMILY MEDICINE

## 2022-05-26 PROCEDURE — 3078F DIAST BP <80 MM HG: CPT | Mod: CPTII,S$GLB,, | Performed by: FAMILY MEDICINE

## 2022-05-26 PROCEDURE — 1159F MED LIST DOCD IN RCRD: CPT | Mod: CPTII,S$GLB,, | Performed by: FAMILY MEDICINE

## 2022-05-26 PROCEDURE — 3072F LOW RISK FOR RETINOPATHY: CPT | Mod: CPTII,S$GLB,, | Performed by: FAMILY MEDICINE

## 2022-05-26 PROCEDURE — 3008F PR BODY MASS INDEX (BMI) DOCUMENTED: ICD-10-PCS | Mod: CPTII,S$GLB,, | Performed by: FAMILY MEDICINE

## 2022-05-26 PROCEDURE — 3075F PR MOST RECENT SYSTOLIC BLOOD PRESS GE 130-139MM HG: ICD-10-PCS | Mod: CPTII,S$GLB,, | Performed by: FAMILY MEDICINE

## 2022-05-26 PROCEDURE — 3078F PR MOST RECENT DIASTOLIC BLOOD PRESSURE < 80 MM HG: ICD-10-PCS | Mod: CPTII,S$GLB,, | Performed by: FAMILY MEDICINE

## 2022-05-26 PROCEDURE — 1126F PR PAIN SEVERITY QUANTIFIED, NO PAIN PRESENT: ICD-10-PCS | Mod: CPTII,S$GLB,, | Performed by: FAMILY MEDICINE

## 2022-05-26 NOTE — PROGRESS NOTES
(Portions of this note were dictated using voice recognition software and may contain dictation related errors in spelling/grammar/syntax not found on text review)    CC:   Chief Complaint   Patient presents with    Annual Exam       HPI: 71 y.o. female here for annual exam.  Additionally followed by endocrinology for diabetes and hypothyroidism.    Hypertension on losartan 100 mg daily, hydrochlorothiazide 25 mg daily, amlodipine 5 mg daily  .  BP stable     Hyperlipidemia, Crestor 40 mg daily.      Diabetes, controlled on metformin 500 b.i.d.      Hypothyroidism on Synthroid, was on 137 mcg but given suppressed TSH been decreased to 112 mcg     Meralgia paresthetica bilaterally, discussed at prior visits, would get numbness sensation bilateral outer thighs, no radiating. .  Takes gabapentin titrated to 300 mg t.i.d if needed.  This is gotten a lot better however that she has lost a significant amount of weight.  She is not really exercising very much but she is really trying to cut down on her food intake.  She denies any symptoms of  hyperthyroidism      Past Medical History:   Diagnosis Date    Cataract     CKD (chronic kidney disease) stage 3, GFR 30-59 ml/min 3/21/2014    Diabetes mellitus type II     GERD (gastroesophageal reflux disease)     Glaucoma (increased eye pressure)     HLD (hyperlipidemia)     HTN (hypertension)     Hypothyroidism     Morbid obesity 3/21/2014    Osteopenia     Vitamin D deficiency 3/21/2014       Past Surgical History:   Procedure Laterality Date     SECTION, LOW TRANSVERSE         Family History   Problem Relation Age of Onset    Coronary artery disease Mother         premature, 57    No Known Problems Father     Cancer Sister     Breast cancer Sister 33    No Known Problems Maternal Aunt     Glaucoma Maternal Uncle     Diabetes Maternal Uncle     No Known Problems Paternal Aunt     No Known Problems Paternal Uncle     No Known Problems Maternal  Grandmother     No Known Problems Maternal Grandfather     No Known Problems Paternal Grandmother     No Known Problems Paternal Grandfather     No Known Problems Brother     Diabetes Unknown     Hypertension Unknown     Thyroid disease Unknown     Amblyopia Neg Hx     Blindness Neg Hx     Cataracts Neg Hx     Macular degeneration Neg Hx     Retinal detachment Neg Hx     Strabismus Neg Hx     Stroke Neg Hx        Social History     Socioeconomic History    Marital status:    Tobacco Use    Smoking status: Former Smoker     Types: Cigarettes    Tobacco comment: occasionally   Substance and Sexual Activity    Alcohol use: No    Drug use: No       Lab Results   Component Value Date    WBC 8.77 05/26/2021    HGB 13.4 05/26/2021    HCT 42.1 05/26/2021    MCV 91 05/26/2021     05/26/2021    CHOL 91 (L) 09/01/2021    TRIG 94 09/01/2021    HDL 32 (L) 09/01/2021    ALT 12 09/01/2021    AST 16 09/01/2021    BILITOT 0.7 09/01/2021    ALKPHOS 75 09/01/2021     09/01/2021    K 3.6 09/01/2021     09/01/2021    CREATININE 1.5 (H) 09/01/2021    ESTGFRAFRICA 40.7 (A) 09/01/2021    EGFRNONAA 35.3 (A) 09/01/2021    CALCIUM 9.9 09/01/2021    ALBUMIN 3.6 09/01/2021    BUN 20 09/01/2021    CO2 22 (L) 09/01/2021    TSH 0.029 (L) 12/23/2021    INR 1.1 01/25/2005    HGBA1C 6.1 (H) 09/01/2021    MICALBCREAT 15.3 05/26/2021    LDLCALC 40.2 (L) 09/01/2021    GLU 99 09/01/2021             Hemoglobin A1C (%)   Date Value   09/01/2021 6.1 (H)   05/26/2021 5.9 (H)   07/14/2020 5.7 (H)   02/26/2020 6.0 (H)   11/16/2018 5.8 (H)   04/18/2018 5.7 (H)     eGFR if African American (mL/min/1.73 m^2)   Date Value   09/01/2021 40.7 (A)   05/26/2021 59.6 (A)   02/26/2020 54.6 (A)   11/16/2018 51.3 (A)   06/16/2017 55.8 (A)   03/07/2017 51.5 (A)                   Vital signs reviewed  PE:   APPEARANCE: Well nourished, well developed, in no acute distress.    HEAD: Normocephalic, atraumatic.  EYES: PERRL. EOMI.    Conjunctivae noninjected.  EARS: TM's intact. Light reflex normal. No retraction or perforation.    NOSE: Mucosa pink. Airway clear.  MOUTH & THROAT: No tonsillar enlargement. No pharyngeal erythema or exudate.   NECK: Supple with no cervical lymphadenopathy.  No carotid bruits.  No thyromegaly  CHEST: Good inspiratory effort. Lungs clear to auscultation with no wheezes or crackles.  CARDIOVASCULAR: Normal S1, S2. No rubs, murmurs, or gallops.  ABDOMEN: Bowel sounds normal. Not distended. Soft. No tenderness or masses. No organomegaly.  EXTREMITIES: No edema, cyanosis, or clubbing.  Does have some pain over bilateral greater trochanters and also along lateral thigh palpation  DIABETIC FOOT EXAM: Protective Sensation (w/ 10 gram monofilament):  Right: Intact  Left: Intact    Visual Inspection:  Normal -  Bilateral and Onychomycosis -  Bilateral    Pedal Pulses:   Right: Present  Left: Present    Posterior tibialis:   Right:Present  Left: Present        IMPRESSION  1. Routine general medical examination at a health care facility    2. Postablative hypothyroidism    3. Stage 3 chronic kidney disease, unspecified whether stage 3a or 3b CKD    4. Diabetes mellitus with stage 3 chronic kidney disease    5. Colon cancer screening        PLAN  Orders Placed This Encounter   Procedures    CBC Auto Differential    Comprehensive Metabolic Panel    Lipid Panel    TSH    Microalbumin/Creatinine Ratio, Urine    Hemoglobin A1C    Fecal Immunochemical Test (iFOBT)   Update labs  Diabetes health maintenance:  -- advise regular and consistent glucose monitoring and medication compliance.  -- advise daily foot checks  -- advise yearly ophthalmologic exams  -- advise adequate dietary and exercise modification  -- advise regular dental visits  -- advise daily low-dose aspirin use if patient is not on other anticoagulants and there are no other contraindications.  -- Medication management:  Continue metformin 500  b.i.d.    Dyslipidemia:  Continue Crestor    Hypothyroidism:  Continue Synthroid 112 mcg daily.  Trend TSH    Hypertension:  Continue losartan 100 mg daily, hydrochlorothiazide 25 mg daily, amlodipine 5 mg daily.  BP stable                HEALTH SCREENINGS  Immunization:  Tdap in 2015   Pneumovax : 9/24/12, 2018  Flu: utd  Pcv:  09/22/2017  CoVID vaccine up-to-date x2    Shingles vaccine up-to-date  Mammogram 03/24/22     Pap:2016     Colonoscopy 2008. Multiple small hyperplastic polyps removed. Repeat in 7-10 years, rpt screening due: FIT negative in February 2020, needs repeat     DEXA scan:  02/26/2020.  Osteopenia:  L-spine T-score is-1.6.  Left Femoral neck T-score is-1.4.  Right femoral neck T-score -1.5

## 2022-05-27 ENCOUNTER — LAB VISIT (OUTPATIENT)
Dept: LAB | Facility: HOSPITAL | Age: 72
End: 2022-05-27
Attending: FAMILY MEDICINE
Payer: COMMERCIAL

## 2022-05-27 DIAGNOSIS — Z12.11 COLON CANCER SCREENING: ICD-10-CM

## 2022-05-27 PROCEDURE — 82274 ASSAY TEST FOR BLOOD FECAL: CPT | Performed by: FAMILY MEDICINE

## 2022-05-30 ENCOUNTER — LAB VISIT (OUTPATIENT)
Dept: LAB | Facility: HOSPITAL | Age: 72
End: 2022-05-30
Attending: FAMILY MEDICINE
Payer: COMMERCIAL

## 2022-05-30 DIAGNOSIS — E11.22 DIABETES MELLITUS WITH STAGE 3 CHRONIC KIDNEY DISEASE: ICD-10-CM

## 2022-05-30 DIAGNOSIS — N18.30 DIABETES MELLITUS WITH STAGE 3 CHRONIC KIDNEY DISEASE: ICD-10-CM

## 2022-05-30 DIAGNOSIS — N18.30 STAGE 3 CHRONIC KIDNEY DISEASE, UNSPECIFIED WHETHER STAGE 3A OR 3B CKD: ICD-10-CM

## 2022-05-30 DIAGNOSIS — I10 ESSENTIAL HYPERTENSION: ICD-10-CM

## 2022-05-30 DIAGNOSIS — E89.0 POSTABLATIVE HYPOTHYROIDISM: ICD-10-CM

## 2022-05-30 LAB
ALBUMIN SERPL BCP-MCNC: 3.3 G/DL (ref 3.5–5.2)
ALP SERPL-CCNC: 78 U/L (ref 55–135)
ALT SERPL W/O P-5'-P-CCNC: 23 U/L (ref 10–44)
ANION GAP SERPL CALC-SCNC: 9 MMOL/L (ref 8–16)
AST SERPL-CCNC: 23 U/L (ref 10–40)
BASOPHILS # BLD AUTO: 0.05 K/UL (ref 0–0.2)
BASOPHILS NFR BLD: 0.5 % (ref 0–1.9)
BILIRUB SERPL-MCNC: 0.4 MG/DL (ref 0.1–1)
BUN SERPL-MCNC: 17 MG/DL (ref 8–23)
CALCIUM SERPL-MCNC: 9.7 MG/DL (ref 8.7–10.5)
CHLORIDE SERPL-SCNC: 108 MMOL/L (ref 95–110)
CHOLEST SERPL-MCNC: 75 MG/DL (ref 120–199)
CHOLEST/HDLC SERPL: 2.7 {RATIO} (ref 2–5)
CO2 SERPL-SCNC: 25 MMOL/L (ref 23–29)
CREAT SERPL-MCNC: 1.2 MG/DL (ref 0.5–1.4)
DIFFERENTIAL METHOD: ABNORMAL
EOSINOPHIL # BLD AUTO: 0.4 K/UL (ref 0–0.5)
EOSINOPHIL NFR BLD: 4.6 % (ref 0–8)
ERYTHROCYTE [DISTWIDTH] IN BLOOD BY AUTOMATED COUNT: 15.3 % (ref 11.5–14.5)
EST. GFR  (AFRICAN AMERICAN): 53.3 ML/MIN/1.73 M^2
EST. GFR  (NON AFRICAN AMERICAN): 46.2 ML/MIN/1.73 M^2
ESTIMATED AVG GLUCOSE: 108 MG/DL (ref 68–131)
GLUCOSE SERPL-MCNC: 87 MG/DL (ref 70–110)
HBA1C MFR BLD: 5.4 % (ref 4–5.6)
HCT VFR BLD AUTO: 34.4 % (ref 37–48.5)
HDLC SERPL-MCNC: 28 MG/DL (ref 40–75)
HDLC SERPL: 37.3 % (ref 20–50)
HGB BLD-MCNC: 10.9 G/DL (ref 12–16)
IMM GRANULOCYTES # BLD AUTO: 0.02 K/UL (ref 0–0.04)
IMM GRANULOCYTES NFR BLD AUTO: 0.2 % (ref 0–0.5)
LDLC SERPL CALC-MCNC: 34 MG/DL (ref 63–159)
LYMPHOCYTES # BLD AUTO: 3.3 K/UL (ref 1–4.8)
LYMPHOCYTES NFR BLD: 34.5 % (ref 18–48)
MCH RBC QN AUTO: 27 PG (ref 27–31)
MCHC RBC AUTO-ENTMCNC: 31.7 G/DL (ref 32–36)
MCV RBC AUTO: 85 FL (ref 82–98)
MONOCYTES # BLD AUTO: 0.8 K/UL (ref 0.3–1)
MONOCYTES NFR BLD: 8.3 % (ref 4–15)
NEUTROPHILS # BLD AUTO: 4.9 K/UL (ref 1.8–7.7)
NEUTROPHILS NFR BLD: 51.9 % (ref 38–73)
NONHDLC SERPL-MCNC: 47 MG/DL
NRBC BLD-RTO: 0 /100 WBC
PLATELET # BLD AUTO: 166 K/UL (ref 150–450)
PMV BLD AUTO: 12.3 FL (ref 9.2–12.9)
POTASSIUM SERPL-SCNC: 4 MMOL/L (ref 3.5–5.1)
PROT SERPL-MCNC: 6.5 G/DL (ref 6–8.4)
RBC # BLD AUTO: 4.04 M/UL (ref 4–5.4)
SODIUM SERPL-SCNC: 142 MMOL/L (ref 136–145)
T4 FREE SERPL-MCNC: 1.16 NG/DL (ref 0.71–1.51)
TRIGL SERPL-MCNC: 65 MG/DL (ref 30–150)
TSH SERPL DL<=0.005 MIU/L-ACNC: 0.02 UIU/ML (ref 0.4–4)
WBC # BLD AUTO: 9.51 K/UL (ref 3.9–12.7)

## 2022-05-30 PROCEDURE — 84439 ASSAY OF FREE THYROXINE: CPT | Performed by: FAMILY MEDICINE

## 2022-05-30 PROCEDURE — 85025 COMPLETE CBC W/AUTO DIFF WBC: CPT | Performed by: FAMILY MEDICINE

## 2022-05-30 PROCEDURE — 80053 COMPREHEN METABOLIC PANEL: CPT | Performed by: FAMILY MEDICINE

## 2022-05-30 PROCEDURE — 36415 COLL VENOUS BLD VENIPUNCTURE: CPT | Mod: PO | Performed by: FAMILY MEDICINE

## 2022-05-30 PROCEDURE — 83036 HEMOGLOBIN GLYCOSYLATED A1C: CPT | Performed by: FAMILY MEDICINE

## 2022-05-30 PROCEDURE — 84443 ASSAY THYROID STIM HORMONE: CPT | Performed by: FAMILY MEDICINE

## 2022-05-30 PROCEDURE — 80061 LIPID PANEL: CPT | Performed by: FAMILY MEDICINE

## 2022-05-30 RX ORDER — LOSARTAN POTASSIUM 100 MG/1
TABLET ORAL
Qty: 90 TABLET | Refills: 3 | Status: SHIPPED | OUTPATIENT
Start: 2022-05-30 | End: 2023-03-06 | Stop reason: SDUPTHER

## 2022-05-30 NOTE — TELEPHONE ENCOUNTER
Refill Authorization Note   Hunter Del Cidpio  is requesting a refill authorization.  Brief Assessment and Rationale for Refill:  Approve     Medication Therapy Plan:       Medication Reconciliation Completed: No   Comments:     No Care Gaps recommended.     Note composed:6:51 PM 05/30/2022

## 2022-05-30 NOTE — TELEPHONE ENCOUNTER
No new care gaps identified.  [facility] Embedded Care Gaps. Reference number: [id number]. 5/30/2022   6:04:01 PM CDT

## 2022-06-02 LAB — HEMOCCULT STL QL IA: NEGATIVE

## 2022-06-06 ENCOUNTER — TELEPHONE (OUTPATIENT)
Dept: FAMILY MEDICINE | Facility: CLINIC | Age: 72
End: 2022-06-06
Payer: COMMERCIAL

## 2022-06-06 DIAGNOSIS — E11.22 DIABETES MELLITUS WITH STAGE 3 CHRONIC KIDNEY DISEASE: ICD-10-CM

## 2022-06-06 DIAGNOSIS — E03.9 HYPOTHYROIDISM, UNSPECIFIED TYPE: ICD-10-CM

## 2022-06-06 DIAGNOSIS — N18.30 DIABETES MELLITUS WITH STAGE 3 CHRONIC KIDNEY DISEASE: ICD-10-CM

## 2022-06-06 DIAGNOSIS — N18.30 STAGE 3 CHRONIC KIDNEY DISEASE, UNSPECIFIED WHETHER STAGE 3A OR 3B CKD: Primary | ICD-10-CM

## 2022-06-06 DIAGNOSIS — D64.9 ANEMIA, UNSPECIFIED TYPE: ICD-10-CM

## 2022-06-06 NOTE — TELEPHONE ENCOUNTER
Please tell patient thatlabs were reviewed.  Diabetes is very well controlled.  Thyroid test looks like it is still a bit overactive.  I believe she is followed by her  endocrinologist regarding thyroid medication so would recommend she check with her regarding that.  The medication dose will likely need to be reduced a little bit.  Kidney function is sluggish but stable.  Blood counts were slightly low, not sure if she reports any bleeding concerns more recently.      Can update labs in 3 months below  Orders Placed This Encounter   Procedures    CBC Auto Differential    Iron and TIBC    Ferritin    Folate    Vitamin B12    Reticulocytes    Comprehensive Metabolic Panel    Hemoglobin A1C    TSH

## 2022-06-06 NOTE — TELEPHONE ENCOUNTER
Attempted to reach patient to go over lab results and schedule repeat lab. Left voicemail for return call. Lab results letter mailed to patient.

## 2022-06-07 ENCOUNTER — TELEPHONE (OUTPATIENT)
Dept: FAMILY MEDICINE | Facility: CLINIC | Age: 72
End: 2022-06-07
Payer: COMMERCIAL

## 2022-06-07 NOTE — TELEPHONE ENCOUNTER
Returned patient's call. Went over lab results and scheduled repeat lab appointment in three months. Patient verbalized understanding.

## 2022-06-07 NOTE — TELEPHONE ENCOUNTER
----- Message from Janeth Antonio sent at 6/6/2022  5:22 PM CDT -----  Type: Requesting to speak with nurse         Who Called: PT  Regarding: returning call please advise   Would the patient rather a call back or a response via [facility]? Call back  Best Call Back Number: [phone]  Additional Information: n/a

## 2022-06-08 ENCOUNTER — TELEPHONE (OUTPATIENT)
Dept: ENDOCRINOLOGY | Facility: CLINIC | Age: 72
End: 2022-06-08
Payer: COMMERCIAL

## 2022-06-09 ENCOUNTER — TELEPHONE (OUTPATIENT)
Dept: ENDOCRINOLOGY | Facility: CLINIC | Age: 72
End: 2022-06-09
Payer: COMMERCIAL

## 2022-06-09 DIAGNOSIS — E89.0 POSTABLATIVE HYPOTHYROIDISM: Primary | ICD-10-CM

## 2022-06-09 RX ORDER — LEVOTHYROXINE SODIUM 100 UG/1
100 TABLET ORAL
Qty: 30 TABLET | Refills: 11 | Status: SHIPPED | OUTPATIENT
Start: 2022-06-09 | End: 2022-07-15

## 2022-06-09 NOTE — PROGRESS NOTES
Message received from PCP regarding suppressed TSH. MA called patient, she is taking LT4 112 mcg daily. Will decrease to LT4 100 mcg daily and check labs in 5 weeks.

## 2022-07-14 ENCOUNTER — LAB VISIT (OUTPATIENT)
Dept: LAB | Facility: HOSPITAL | Age: 72
End: 2022-07-14
Attending: NURSE PRACTITIONER
Payer: COMMERCIAL

## 2022-07-14 DIAGNOSIS — E89.0 POSTABLATIVE HYPOTHYROIDISM: ICD-10-CM

## 2022-07-14 LAB
T4 FREE SERPL-MCNC: 1.22 NG/DL (ref 0.71–1.51)
TSH SERPL DL<=0.005 MIU/L-ACNC: 0.08 UIU/ML (ref 0.4–4)

## 2022-07-14 PROCEDURE — 36415 COLL VENOUS BLD VENIPUNCTURE: CPT | Mod: PO | Performed by: NURSE PRACTITIONER

## 2022-07-14 PROCEDURE — 84443 ASSAY THYROID STIM HORMONE: CPT | Performed by: NURSE PRACTITIONER

## 2022-07-14 PROCEDURE — 84439 ASSAY OF FREE THYROXINE: CPT | Performed by: NURSE PRACTITIONER

## 2022-07-15 ENCOUNTER — PATIENT MESSAGE (OUTPATIENT)
Dept: ENDOCRINOLOGY | Facility: CLINIC | Age: 72
End: 2022-07-15
Payer: COMMERCIAL

## 2022-07-15 DIAGNOSIS — E89.0 POSTABLATIVE HYPOTHYROIDISM: Primary | ICD-10-CM

## 2022-07-15 RX ORDER — LEVOTHYROXINE SODIUM 88 UG/1
88 TABLET ORAL
Qty: 30 TABLET | Refills: 3 | Status: SHIPPED | OUTPATIENT
Start: 2022-07-15 | End: 2022-09-09 | Stop reason: SDUPTHER

## 2022-08-15 ENCOUNTER — TELEPHONE (OUTPATIENT)
Dept: ENDOCRINOLOGY | Facility: CLINIC | Age: 72
End: 2022-08-15
Payer: COMMERCIAL

## 2022-08-15 NOTE — TELEPHONE ENCOUNTER
----- Message from Joanna Corrales sent at 8/15/2022  4:05 PM CDT -----  Contact: @ [phone]  Pt is calling in to return a missed call please call and adv @ [phone]

## 2022-08-25 ENCOUNTER — OFFICE VISIT (OUTPATIENT)
Dept: ENDOCRINOLOGY | Facility: CLINIC | Age: 72
End: 2022-08-25
Payer: COMMERCIAL

## 2022-08-25 VITALS
OXYGEN SATURATION: 98 % | WEIGHT: 186.06 LBS | HEIGHT: 61 IN | SYSTOLIC BLOOD PRESSURE: 112 MMHG | BODY MASS INDEX: 35.13 KG/M2 | DIASTOLIC BLOOD PRESSURE: 66 MMHG | HEART RATE: 70 BPM

## 2022-08-25 DIAGNOSIS — E11.9 TYPE 2 DIABETES MELLITUS WITHOUT COMPLICATION, WITHOUT LONG-TERM CURRENT USE OF INSULIN: ICD-10-CM

## 2022-08-25 DIAGNOSIS — E78.2 MIXED HYPERLIPIDEMIA: ICD-10-CM

## 2022-08-25 DIAGNOSIS — E55.9 VITAMIN D DEFICIENCY: ICD-10-CM

## 2022-08-25 DIAGNOSIS — E89.0 POSTABLATIVE HYPOTHYROIDISM: Primary | ICD-10-CM

## 2022-08-25 DIAGNOSIS — I10 ESSENTIAL HYPERTENSION: ICD-10-CM

## 2022-08-25 DIAGNOSIS — M85.80 OSTEOPENIA, UNSPECIFIED LOCATION: ICD-10-CM

## 2022-08-25 PROCEDURE — 1101F PT FALLS ASSESS-DOCD LE1/YR: CPT | Mod: CPTII,S$GLB,, | Performed by: INTERNAL MEDICINE

## 2022-08-25 PROCEDURE — 1101F PR PT FALLS ASSESS DOC 0-1 FALLS W/OUT INJ PAST YR: ICD-10-PCS | Mod: CPTII,S$GLB,, | Performed by: INTERNAL MEDICINE

## 2022-08-25 PROCEDURE — 3078F PR MOST RECENT DIASTOLIC BLOOD PRESSURE < 80 MM HG: ICD-10-PCS | Mod: CPTII,S$GLB,, | Performed by: INTERNAL MEDICINE

## 2022-08-25 PROCEDURE — 3044F PR MOST RECENT HEMOGLOBIN A1C LEVEL <7.0%: ICD-10-PCS | Mod: CPTII,S$GLB,, | Performed by: INTERNAL MEDICINE

## 2022-08-25 PROCEDURE — 1160F PR REVIEW ALL MEDS BY PRESCRIBER/CLIN PHARMACIST DOCUMENTED: ICD-10-PCS | Mod: CPTII,S$GLB,, | Performed by: INTERNAL MEDICINE

## 2022-08-25 PROCEDURE — 3074F PR MOST RECENT SYSTOLIC BLOOD PRESSURE < 130 MM HG: ICD-10-PCS | Mod: CPTII,S$GLB,, | Performed by: INTERNAL MEDICINE

## 2022-08-25 PROCEDURE — 3060F POS MICROALBUMINURIA REV: CPT | Mod: CPTII,S$GLB,, | Performed by: INTERNAL MEDICINE

## 2022-08-25 PROCEDURE — 99214 PR OFFICE/OUTPT VISIT, EST, LEVL IV, 30-39 MIN: ICD-10-PCS | Mod: S$GLB,,, | Performed by: INTERNAL MEDICINE

## 2022-08-25 PROCEDURE — 1126F AMNT PAIN NOTED NONE PRSNT: CPT | Mod: CPTII,S$GLB,, | Performed by: INTERNAL MEDICINE

## 2022-08-25 PROCEDURE — 1159F MED LIST DOCD IN RCRD: CPT | Mod: CPTII,S$GLB,, | Performed by: INTERNAL MEDICINE

## 2022-08-25 PROCEDURE — 3066F PR DOCUMENTATION OF TREATMENT FOR NEPHROPATHY: ICD-10-PCS | Mod: CPTII,S$GLB,, | Performed by: INTERNAL MEDICINE

## 2022-08-25 PROCEDURE — 4010F ACE/ARB THERAPY RXD/TAKEN: CPT | Mod: CPTII,S$GLB,, | Performed by: INTERNAL MEDICINE

## 2022-08-25 PROCEDURE — 3008F PR BODY MASS INDEX (BMI) DOCUMENTED: ICD-10-PCS | Mod: CPTII,S$GLB,, | Performed by: INTERNAL MEDICINE

## 2022-08-25 PROCEDURE — 3072F PR LOW RISK FOR RETINOPATHY: ICD-10-PCS | Mod: CPTII,S$GLB,, | Performed by: INTERNAL MEDICINE

## 2022-08-25 PROCEDURE — 1126F PR PAIN SEVERITY QUANTIFIED, NO PAIN PRESENT: ICD-10-PCS | Mod: CPTII,S$GLB,, | Performed by: INTERNAL MEDICINE

## 2022-08-25 PROCEDURE — 3074F SYST BP LT 130 MM HG: CPT | Mod: CPTII,S$GLB,, | Performed by: INTERNAL MEDICINE

## 2022-08-25 PROCEDURE — 99214 OFFICE O/P EST MOD 30 MIN: CPT | Mod: S$GLB,,, | Performed by: INTERNAL MEDICINE

## 2022-08-25 PROCEDURE — 4010F PR ACE/ARB THEARPY RXD/TAKEN: ICD-10-PCS | Mod: CPTII,S$GLB,, | Performed by: INTERNAL MEDICINE

## 2022-08-25 PROCEDURE — 3072F LOW RISK FOR RETINOPATHY: CPT | Mod: CPTII,S$GLB,, | Performed by: INTERNAL MEDICINE

## 2022-08-25 PROCEDURE — 3078F DIAST BP <80 MM HG: CPT | Mod: CPTII,S$GLB,, | Performed by: INTERNAL MEDICINE

## 2022-08-25 PROCEDURE — 3060F PR POS MICROALBUMINURIA RESULT DOCUMENTED/REVIEW: ICD-10-PCS | Mod: CPTII,S$GLB,, | Performed by: INTERNAL MEDICINE

## 2022-08-25 PROCEDURE — 1160F RVW MEDS BY RX/DR IN RCRD: CPT | Mod: CPTII,S$GLB,, | Performed by: INTERNAL MEDICINE

## 2022-08-25 PROCEDURE — 99999 PR PBB SHADOW E&M-EST. PATIENT-LVL IV: CPT | Mod: PBBFAC,,, | Performed by: INTERNAL MEDICINE

## 2022-08-25 PROCEDURE — 99999 PR PBB SHADOW E&M-EST. PATIENT-LVL IV: ICD-10-PCS | Mod: PBBFAC,,, | Performed by: INTERNAL MEDICINE

## 2022-08-25 PROCEDURE — 1159F PR MEDICATION LIST DOCUMENTED IN MEDICAL RECORD: ICD-10-PCS | Mod: CPTII,S$GLB,, | Performed by: INTERNAL MEDICINE

## 2022-08-25 PROCEDURE — 3044F HG A1C LEVEL LT 7.0%: CPT | Mod: CPTII,S$GLB,, | Performed by: INTERNAL MEDICINE

## 2022-08-25 PROCEDURE — 3066F NEPHROPATHY DOC TX: CPT | Mod: CPTII,S$GLB,, | Performed by: INTERNAL MEDICINE

## 2022-08-25 PROCEDURE — 3288F PR FALLS RISK ASSESSMENT DOCUMENTED: ICD-10-PCS | Mod: CPTII,S$GLB,, | Performed by: INTERNAL MEDICINE

## 2022-08-25 PROCEDURE — 3008F BODY MASS INDEX DOCD: CPT | Mod: CPTII,S$GLB,, | Performed by: INTERNAL MEDICINE

## 2022-08-25 PROCEDURE — 3288F FALL RISK ASSESSMENT DOCD: CPT | Mod: CPTII,S$GLB,, | Performed by: INTERNAL MEDICINE

## 2022-08-25 RX ORDER — METFORMIN HYDROCHLORIDE 500 MG/1
500 TABLET ORAL 2 TIMES DAILY WITH MEALS
Qty: 180 TABLET | Refills: 3 | Status: SHIPPED | OUTPATIENT
Start: 2022-08-25 | End: 2023-10-20

## 2022-08-25 RX ORDER — ROSUVASTATIN CALCIUM 40 MG/1
40 TABLET, COATED ORAL NIGHTLY
Qty: 90 TABLET | Refills: 3 | Status: SHIPPED | OUTPATIENT
Start: 2022-08-25 | End: 2023-09-21

## 2022-08-25 NOTE — ASSESSMENT & PLAN NOTE
Last TSH low and has had reduction in LT4 dose which may be due to weight loss and taking medication appropriately  Update TSH in coming weeks  Cont LT4 88 mcg daily with dose titration pending results

## 2022-08-25 NOTE — ASSESSMENT & PLAN NOTE
Last A1c normal  Tolerating metformin well  If able to maintain weight loss and A1c remains well controlled can consider trial off in future

## 2022-08-25 NOTE — PROGRESS NOTES
Subjective:      Patient ID: Hunter Cisneros is a 71 y.o. female.    Chief Complaint:  Diabetes and Hypothyroidism    History of Present Illness: Hunter Cisneros is a 71 y.o. woman with Type 2 DM, HTN, CKD stage 3, postablative hypothyroidism, vitamin d deficiency, dyslipidemia, osteopenia, GERD, and ocular hypertension presents for follow up of Type 2 DM.     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   Reduced 1 month ago from 100 mcg daily    Palpitations and tremor better with reduction in dose  Intentional weight loss about 40 pounds over past several months  Will have repeat labs in coming weeks    Lab Results   Component Value Date    TSH 0.083 (L) 07/14/2022     With regards to the diabetes:    Diagnosed with Type 2 DM July 2011  Family History of Type 2 DM: maternal uncles  Known complications:  DKA/HHS: denies  RN: denies; up to date  PN: on gabapentin  Nephropathy: denies  Gastroparesis: denies  CAD: denies    Current regimen:  On metformin 500 mg twice daily    Missed doses? -none    Other medications tried:  invokana-too expensive    1-2 # times a day testing  Log reviewed: none Oral recall 's    Hypoglycemic event- Denies  Yes, did not need assistance   Knows how to correct with 15 grams of carbs- juice, coke, or a peppermint.     Dietary recall: improved with weight loss as above  Eats 3 meals a day.   Snacks: little bit             Education - last visit: politely patricia lopez Management Status  Statin: Taking  ACE/ARB: Taking    Lab Results   Component Value Date    HGBA1C 5.4 05/30/2022    HGBA1C 6.1 (H) 09/01/2021    HGBA1C 5.9 (H) 05/26/2021     Screening or Prevention Patient's value Goal Complete/Controlled?   HgA1C Testing and Control   Lab Results   Component Value Date    HGBA1C 5.4 05/30/2022      Annually/Less  than 8% Yes   Lipid profile : 05/30/2022 Annually Yes   LDL control Lab Results   Component Value Date    LDLCALC 34.0 (L) 05/30/2022    Annually/Less than 100 mg/dl  Yes   Nephropathy screening Lab Results   Component Value Date    LABMICR 104.0 05/30/2022     Lab Results   Component Value Date    PROTEINUA Negative 04/25/2014    Annually No   Blood pressure BP Readings from Last 1 Encounters:   08/25/22 112/66    Less than 140/90 Yes   Dilated retinal exam : 11/17/2021 Annually No   Foot exam   : 08/17/2021 Annually No     With regards to obesity,     Weight loss as above    Body mass index is 35.16 kg/m².    With regards to osteopenia and Vit D deficiency,     Vit D def and osteopenia spine and fem neck (BMD in 2020)  Taking Vit D 1000 iu daily  Taking calcium 1200 mg daily  Denies fractures    Social alcohol   Rare tobacco    She is walking for exercise     2/26/2020 DXA  COMPARISON:  11/15/2017  FINDINGS:  The T score associated with the lumbar spine is -1.6 on the current examination.  It was -1.4 on the prior examination.  The T score associated with the left femoral neck is -1.4 on the current examination.  It was -1.7 on the prior examination.  The T score associated with the right femoral neck is -1.5 on the current examination.  It was -1.6 on the prior examination.  Impression:  Osteopenia  Vit D, 25-Hydroxy   Date Value Ref Range Status   11/03/2016 37 30 - 96 ng/mL Final     Comment:     Vitamin D deficiency.........<10 ng/mL                              Vitamin D insufficiency......10-29 ng/mL       Vitamin D sufficiency........> or equal to 30 ng/mL  Vitamin D toxicity............>100 ng/mL           With regards to hypertension,     She is taking losartan-HCTZ  BP at goal today    With regards to dyslipidemia,      She is on crestor 40 mg qhs    Results for VICKYLEXII JAIN (MRN [id number]) as of 8/17/2021 08:58  Lab Results   Component Value Date    LDLCALC 34.0 (L) 05/30/2022           Objective:         Vitals:    08/25/22 1109   BP: 112/66   Pulse: 70       Physical Exam  Vitals reviewed.   Constitutional:       Appearance: She is well-developed.   Neck:      Thyroid: No thyromegaly.   Pulmonary:      Effort: Pulmonary effort is normal.   Abdominal:      Palpations: Abdomen is soft.   Musculoskeletal:      Comments: Using cane     Protective Sensation (w/ 10 gram monofilament):8/25/22  Right: Intact  Left: Intact    Visual Inspection:  Normal -  Bilateral    Pedal Pulses:   Right: Present  Left: Present    Posterior tibialis:   Right:Present  Left: Present      Lab Review:   Lab Results   Component Value Date    HGBA1C 5.4 05/30/2022     Lab Results   Component Value Date    TSH 0.083 (L) 07/14/2022       Assessment:     1. Postablative hypothyroidism  TSH   2. DM (diabetes mellitus), type 2, uncontrolled with complications     3. Vitamin D deficiency  Vitamin D   4. Type 2 diabetes mellitus without complication, without long-term current use of insulin  metFORMIN (GLUCOPHAGE) 500 MG tablet   5. Mixed hyperlipidemia  rosuvastatin (CRESTOR) 40 MG Tab   6. Osteopenia, unspecified location     7. BMI 35.0-35.9,adult     8. Essential hypertension          Plan:     Postablative hypothyroidism  Last TSH low and has had reduction in LT4 dose which may be due to weight loss and taking medication appropriately  Update TSH in coming weeks  Cont LT4 88 mcg daily with dose titration pending results    Type 2 diabetes mellitus without complication  Last A1c normal  Tolerating metformin well  If able to maintain weight loss and A1c remains well controlled can consider trial off in future    Vitamin D deficiency  Update with next labs    Osteopenia  Due for DXA in 2023  Has not met criteria for treatment in past    BMI 35.0-35.9,adult  Excellent weight loss with recent diet changes    Mixed hyperlipidemia  LDL at goal    Essential hypertension  BP at goal      Follow up in about 1 year (around 8/25/2023).

## 2022-09-08 ENCOUNTER — LAB VISIT (OUTPATIENT)
Dept: LAB | Facility: HOSPITAL | Age: 72
End: 2022-09-08
Attending: FAMILY MEDICINE
Payer: COMMERCIAL

## 2022-09-08 DIAGNOSIS — E03.9 HYPOTHYROIDISM, UNSPECIFIED TYPE: ICD-10-CM

## 2022-09-08 DIAGNOSIS — E55.9 VITAMIN D DEFICIENCY: ICD-10-CM

## 2022-09-08 DIAGNOSIS — E11.22 DIABETES MELLITUS WITH STAGE 3 CHRONIC KIDNEY DISEASE: ICD-10-CM

## 2022-09-08 DIAGNOSIS — N18.30 STAGE 3 CHRONIC KIDNEY DISEASE, UNSPECIFIED WHETHER STAGE 3A OR 3B CKD: ICD-10-CM

## 2022-09-08 DIAGNOSIS — E89.0 POSTABLATIVE HYPOTHYROIDISM: ICD-10-CM

## 2022-09-08 DIAGNOSIS — N18.30 DIABETES MELLITUS WITH STAGE 3 CHRONIC KIDNEY DISEASE: ICD-10-CM

## 2022-09-08 DIAGNOSIS — D64.9 ANEMIA, UNSPECIFIED TYPE: ICD-10-CM

## 2022-09-08 LAB
25(OH)D3+25(OH)D2 SERPL-MCNC: 64 NG/ML (ref 30–96)
ALBUMIN SERPL BCP-MCNC: 3.5 G/DL (ref 3.5–5.2)
ALP SERPL-CCNC: 76 U/L (ref 55–135)
ALT SERPL W/O P-5'-P-CCNC: 75 U/L (ref 10–44)
ANION GAP SERPL CALC-SCNC: 10 MMOL/L (ref 8–16)
AST SERPL-CCNC: 60 U/L (ref 10–40)
BASOPHILS # BLD AUTO: 0.04 K/UL (ref 0–0.2)
BASOPHILS NFR BLD: 0.5 % (ref 0–1.9)
BILIRUB SERPL-MCNC: 0.5 MG/DL (ref 0.1–1)
BUN SERPL-MCNC: 20 MG/DL (ref 8–23)
CALCIUM SERPL-MCNC: 10.1 MG/DL (ref 8.7–10.5)
CHLORIDE SERPL-SCNC: 108 MMOL/L (ref 95–110)
CO2 SERPL-SCNC: 23 MMOL/L (ref 23–29)
CREAT SERPL-MCNC: 1.3 MG/DL (ref 0.5–1.4)
DIFFERENTIAL METHOD: ABNORMAL
EOSINOPHIL # BLD AUTO: 0.3 K/UL (ref 0–0.5)
EOSINOPHIL NFR BLD: 3.4 % (ref 0–8)
ERYTHROCYTE [DISTWIDTH] IN BLOOD BY AUTOMATED COUNT: 14.6 % (ref 11.5–14.5)
EST. GFR  (NO RACE VARIABLE): 44.2 ML/MIN/1.73 M^2
ESTIMATED AVG GLUCOSE: 105 MG/DL (ref 68–131)
FERRITIN SERPL-MCNC: 689 NG/ML (ref 20–300)
FOLATE SERPL-MCNC: 9.7 NG/ML (ref 4–24)
GLUCOSE SERPL-MCNC: 74 MG/DL (ref 70–110)
HBA1C MFR BLD: 5.3 % (ref 4–5.6)
HCT VFR BLD AUTO: 37.1 % (ref 37–48.5)
HGB BLD-MCNC: 11.8 G/DL (ref 12–16)
IMM GRANULOCYTES # BLD AUTO: 0.03 K/UL (ref 0–0.04)
IMM GRANULOCYTES NFR BLD AUTO: 0.4 % (ref 0–0.5)
IRON SERPL-MCNC: 57 UG/DL (ref 30–160)
LYMPHOCYTES # BLD AUTO: 2.5 K/UL (ref 1–4.8)
LYMPHOCYTES NFR BLD: 29.5 % (ref 18–48)
MCH RBC QN AUTO: 27.4 PG (ref 27–31)
MCHC RBC AUTO-ENTMCNC: 31.8 G/DL (ref 32–36)
MCV RBC AUTO: 86 FL (ref 82–98)
MONOCYTES # BLD AUTO: 0.6 K/UL (ref 0.3–1)
MONOCYTES NFR BLD: 7 % (ref 4–15)
NEUTROPHILS # BLD AUTO: 4.9 K/UL (ref 1.8–7.7)
NEUTROPHILS NFR BLD: 59.2 % (ref 38–73)
NRBC BLD-RTO: 0 /100 WBC
PLATELET # BLD AUTO: 156 K/UL (ref 150–450)
PMV BLD AUTO: 12.6 FL (ref 9.2–12.9)
POTASSIUM SERPL-SCNC: 4.2 MMOL/L (ref 3.5–5.1)
PROT SERPL-MCNC: 6.9 G/DL (ref 6–8.4)
RBC # BLD AUTO: 4.3 M/UL (ref 4–5.4)
RETICS/RBC NFR AUTO: 1.6 % (ref 0.5–2.5)
SATURATED IRON: 21 % (ref 20–50)
SODIUM SERPL-SCNC: 141 MMOL/L (ref 136–145)
TOTAL IRON BINDING CAPACITY: 275 UG/DL (ref 250–450)
TRANSFERRIN SERPL-MCNC: 186 MG/DL (ref 200–375)
TSH SERPL DL<=0.005 MIU/L-ACNC: 0.54 UIU/ML (ref 0.4–4)
TSH SERPL DL<=0.005 MIU/L-ACNC: 0.54 UIU/ML (ref 0.4–4)
VIT B12 SERPL-MCNC: 416 PG/ML (ref 210–950)
WBC # BLD AUTO: 8.31 K/UL (ref 3.9–12.7)

## 2022-09-08 PROCEDURE — 82306 VITAMIN D 25 HYDROXY: CPT | Performed by: INTERNAL MEDICINE

## 2022-09-08 PROCEDURE — 82728 ASSAY OF FERRITIN: CPT | Performed by: FAMILY MEDICINE

## 2022-09-08 PROCEDURE — 84466 ASSAY OF TRANSFERRIN: CPT | Performed by: FAMILY MEDICINE

## 2022-09-08 PROCEDURE — 83036 HEMOGLOBIN GLYCOSYLATED A1C: CPT | Performed by: FAMILY MEDICINE

## 2022-09-08 PROCEDURE — 85025 COMPLETE CBC W/AUTO DIFF WBC: CPT | Performed by: FAMILY MEDICINE

## 2022-09-08 PROCEDURE — 80053 COMPREHEN METABOLIC PANEL: CPT | Performed by: FAMILY MEDICINE

## 2022-09-08 PROCEDURE — 85045 AUTOMATED RETICULOCYTE COUNT: CPT | Performed by: FAMILY MEDICINE

## 2022-09-08 PROCEDURE — 36415 COLL VENOUS BLD VENIPUNCTURE: CPT | Mod: PO | Performed by: FAMILY MEDICINE

## 2022-09-08 PROCEDURE — 84443 ASSAY THYROID STIM HORMONE: CPT | Performed by: FAMILY MEDICINE

## 2022-09-08 PROCEDURE — 82607 VITAMIN B-12: CPT | Performed by: FAMILY MEDICINE

## 2022-09-08 PROCEDURE — 82746 ASSAY OF FOLIC ACID SERUM: CPT | Performed by: FAMILY MEDICINE

## 2022-09-09 ENCOUNTER — TELEPHONE (OUTPATIENT)
Dept: ENDOCRINOLOGY | Facility: CLINIC | Age: 72
End: 2022-09-09
Payer: COMMERCIAL

## 2022-09-09 ENCOUNTER — TELEPHONE (OUTPATIENT)
Dept: FAMILY MEDICINE | Facility: CLINIC | Age: 72
End: 2022-09-09
Payer: COMMERCIAL

## 2022-09-09 DIAGNOSIS — E03.9 HYPOTHYROIDISM, UNSPECIFIED TYPE: ICD-10-CM

## 2022-09-09 DIAGNOSIS — N18.30 STAGE 3 CHRONIC KIDNEY DISEASE, UNSPECIFIED WHETHER STAGE 3A OR 3B CKD: Primary | ICD-10-CM

## 2022-09-09 DIAGNOSIS — E89.0 POSTABLATIVE HYPOTHYROIDISM: Primary | ICD-10-CM

## 2022-09-09 DIAGNOSIS — I10 ESSENTIAL HYPERTENSION: ICD-10-CM

## 2022-09-09 DIAGNOSIS — R74.01 TRANSAMINITIS: ICD-10-CM

## 2022-09-09 RX ORDER — LEVOTHYROXINE SODIUM 88 UG/1
88 TABLET ORAL
Qty: 30 TABLET | Refills: 11 | Status: SHIPPED | OUTPATIENT
Start: 2022-09-09 | End: 2023-10-20

## 2022-09-09 NOTE — TELEPHONE ENCOUNTER
Please tell patient labs were mainly normal, kidney function was a bit sluggish though and a couple of  liver enzymes were mildly elevated.  Please make sure to focus on healthy diet, hydrating really well, monitoring your blood pressure regularly.  I would like to recheck a lab below in a month.  If your blood pressure is running very normal , such as less than 120 on the top number and less than 80 on the bottom number but the kidney function is still sluggish, we may need to scale back on your diuretic medication (hydrochlorothiazide) especially if you have lost weight, as some of the fluid pill diuretic medications can sometimes keep people little bit dehydrated and affect kidney function      Orders Placed This Encounter   Procedures    Comprehensive Metabolic Panel

## 2022-09-09 NOTE — TELEPHONE ENCOUNTER
Spoke to patient and let her know that Dr Cornell reviewed her labs and her TSH has improved but is still a little lower than Dr Cornell would like , Dr Cornell  would like you to  reduce your dose of levothyroxine 88 mcg by having you skip the dose on Sundays for total of 6 tabs weekly. Repeat TSH 6 weeks   Vitamin D at goal, continue current supplements

## 2022-09-09 NOTE — TELEPHONE ENCOUNTER
----- Message from Tabitha Cornell MD sent at 9/9/2022  8:03 AM CDT -----  Please let her know TSH has improved but is still a little lower than I would like so I would like to reduce her dose of levothyroxine 88 mcg by having her skip the dose on Sundays for total of 6 tabs weekly. Repeat TSH 6 weeks  Vitamin D at goal, continue current supplements

## 2022-09-13 NOTE — TELEPHONE ENCOUNTER
Called patient and went over lab results. Asked that she monitor her BP daily, which she said she has been doing but not every day, and concentrate on staying hydrated and a healthy diet. Also scheduled her next lab appointment and told her that her medications may be adjusted at that time based on the results. She verbalized understanding.

## 2022-10-17 ENCOUNTER — LAB VISIT (OUTPATIENT)
Dept: LAB | Facility: HOSPITAL | Age: 72
End: 2022-10-17
Attending: FAMILY MEDICINE
Payer: COMMERCIAL

## 2022-10-17 ENCOUNTER — TELEPHONE (OUTPATIENT)
Dept: ENDOCRINOLOGY | Facility: CLINIC | Age: 72
End: 2022-10-17
Payer: COMMERCIAL

## 2022-10-17 ENCOUNTER — TELEPHONE (OUTPATIENT)
Dept: FAMILY MEDICINE | Facility: CLINIC | Age: 72
End: 2022-10-17
Payer: COMMERCIAL

## 2022-10-17 DIAGNOSIS — E03.9 HYPOTHYROIDISM, UNSPECIFIED TYPE: ICD-10-CM

## 2022-10-17 DIAGNOSIS — N18.30 DIABETES MELLITUS WITH STAGE 3 CHRONIC KIDNEY DISEASE: Primary | ICD-10-CM

## 2022-10-17 DIAGNOSIS — E11.22 DIABETES MELLITUS WITH STAGE 3 CHRONIC KIDNEY DISEASE: Primary | ICD-10-CM

## 2022-10-17 DIAGNOSIS — N18.30 STAGE 3 CHRONIC KIDNEY DISEASE, UNSPECIFIED WHETHER STAGE 3A OR 3B CKD: ICD-10-CM

## 2022-10-17 DIAGNOSIS — R74.01 TRANSAMINITIS: ICD-10-CM

## 2022-10-17 DIAGNOSIS — E11.9 TYPE 2 DIABETES MELLITUS WITHOUT COMPLICATION: ICD-10-CM

## 2022-10-17 DIAGNOSIS — E89.0 POSTABLATIVE HYPOTHYROIDISM: ICD-10-CM

## 2022-10-17 LAB
ALBUMIN SERPL BCP-MCNC: 3.6 G/DL (ref 3.5–5.2)
ALP SERPL-CCNC: 76 U/L (ref 55–135)
ALT SERPL W/O P-5'-P-CCNC: 22 U/L (ref 10–44)
ANION GAP SERPL CALC-SCNC: 10 MMOL/L (ref 8–16)
AST SERPL-CCNC: 21 U/L (ref 10–40)
BILIRUB SERPL-MCNC: 0.6 MG/DL (ref 0.1–1)
BUN SERPL-MCNC: 19 MG/DL (ref 8–23)
CALCIUM SERPL-MCNC: 10.1 MG/DL (ref 8.7–10.5)
CHLORIDE SERPL-SCNC: 106 MMOL/L (ref 95–110)
CO2 SERPL-SCNC: 23 MMOL/L (ref 23–29)
CREAT SERPL-MCNC: 1.7 MG/DL (ref 0.5–1.4)
EST. GFR  (NO RACE VARIABLE): 32.1 ML/MIN/1.73 M^2
ESTIMATED AVG GLUCOSE: 108 MG/DL (ref 68–131)
GLUCOSE SERPL-MCNC: 88 MG/DL (ref 70–110)
HBA1C MFR BLD: 5.4 % (ref 4–5.6)
POTASSIUM SERPL-SCNC: 4.2 MMOL/L (ref 3.5–5.1)
PROT SERPL-MCNC: 7.4 G/DL (ref 6–8.4)
SODIUM SERPL-SCNC: 139 MMOL/L (ref 136–145)
TSH SERPL DL<=0.005 MIU/L-ACNC: 1.43 UIU/ML (ref 0.4–4)

## 2022-10-17 PROCEDURE — 83036 HEMOGLOBIN GLYCOSYLATED A1C: CPT | Performed by: FAMILY MEDICINE

## 2022-10-17 PROCEDURE — 80053 COMPREHEN METABOLIC PANEL: CPT | Performed by: FAMILY MEDICINE

## 2022-10-17 PROCEDURE — 36415 COLL VENOUS BLD VENIPUNCTURE: CPT | Mod: PO | Performed by: INTERNAL MEDICINE

## 2022-10-17 PROCEDURE — 84443 ASSAY THYROID STIM HORMONE: CPT | Performed by: INTERNAL MEDICINE

## 2022-10-17 NOTE — TELEPHONE ENCOUNTER
Spoke with patient. Informed her of results, gave her instructions to continue same dose of levothyroxine and recheck lab annually. Pt verbalized understanding

## 2022-10-17 NOTE — TELEPHONE ENCOUNTER
----- Message from Tabitha Cornell MD sent at 10/17/2022  2:08 PM CDT -----  Please let her know that thyroid function is at goal so we will continue current dose of levothyroxine and monitor level annually

## 2022-10-17 NOTE — TELEPHONE ENCOUNTER
Please tell patient that repeat kidney labs look like the kidney function has gone down further.  Please make sure she is checking her blood pressure.  If blood pressure is stable I want her to stop her hydrochlorothiazide fluid pill and she can continue her other blood pressure medicines like amlodipine and losartan as is.  Make sure she stays well hydrated and also avoids any anti-inflammatory medication like Advil or Aleve.  Let me know however if blood pressure is elevated already with the above regimen since high blood pressures can also affect kidney function.  Needs follow-up in 1 month with me.  Will get repeat BMP urine protein and also renal ultrasound prior to visit.    Orders Placed This Encounter   Procedures    US Retroperitoneal Complete    Basic Metabolic Panel    Protein / creatinine ratio, urine

## 2022-10-21 ENCOUNTER — HOSPITAL ENCOUNTER (OUTPATIENT)
Dept: RADIOLOGY | Facility: HOSPITAL | Age: 72
Discharge: HOME | End: 2022-10-21
Attending: FAMILY MEDICINE
Payer: COMMERCIAL

## 2022-10-21 DIAGNOSIS — N18.30 DIABETES MELLITUS WITH STAGE 3 CHRONIC KIDNEY DISEASE: ICD-10-CM

## 2022-10-21 DIAGNOSIS — E11.22 DIABETES MELLITUS WITH STAGE 3 CHRONIC KIDNEY DISEASE: ICD-10-CM

## 2022-10-21 PROCEDURE — 76770 US EXAM ABDO BACK WALL COMP: CPT | Mod: TC

## 2022-10-21 NOTE — TELEPHONE ENCOUNTER
YESSI:  Called patient and went over her information from the lab. She stated that her BP has only gone over 140/90 once. Every other time she has checked it has been under 140 and under 90. She understands that she is to stop her hydrochlorothiazide, stay well hydrated, and avoid anti-inflammatories. Scheduled her labs and follow up appointment for late November.

## 2022-11-21 ENCOUNTER — LAB VISIT (OUTPATIENT)
Dept: LAB | Facility: HOSPITAL | Age: 72
End: 2022-11-21
Attending: FAMILY MEDICINE
Payer: COMMERCIAL

## 2022-11-21 DIAGNOSIS — N18.30 DIABETES MELLITUS WITH STAGE 3 CHRONIC KIDNEY DISEASE: ICD-10-CM

## 2022-11-21 DIAGNOSIS — E11.22 DIABETES MELLITUS WITH STAGE 3 CHRONIC KIDNEY DISEASE: ICD-10-CM

## 2022-11-21 LAB
ANION GAP SERPL CALC-SCNC: 11 MMOL/L (ref 8–16)
BUN SERPL-MCNC: 17 MG/DL (ref 8–23)
CALCIUM SERPL-MCNC: 10.6 MG/DL (ref 8.7–10.5)
CHLORIDE SERPL-SCNC: 105 MMOL/L (ref 95–110)
CO2 SERPL-SCNC: 24 MMOL/L (ref 23–29)
CREAT SERPL-MCNC: 1.5 MG/DL (ref 0.5–1.4)
EST. GFR  (NO RACE VARIABLE): 37.3 ML/MIN/1.73 M^2
GLUCOSE SERPL-MCNC: 99 MG/DL (ref 70–110)
POTASSIUM SERPL-SCNC: 4.2 MMOL/L (ref 3.5–5.1)
SODIUM SERPL-SCNC: 140 MMOL/L (ref 136–145)

## 2022-11-21 PROCEDURE — 80048 BASIC METABOLIC PNL TOTAL CA: CPT | Performed by: FAMILY MEDICINE

## 2022-11-21 PROCEDURE — 36415 COLL VENOUS BLD VENIPUNCTURE: CPT | Mod: PO | Performed by: FAMILY MEDICINE

## 2022-11-28 ENCOUNTER — OFFICE VISIT (OUTPATIENT)
Dept: FAMILY MEDICINE | Facility: CLINIC | Age: 72
End: 2022-11-28
Payer: COMMERCIAL

## 2022-11-28 ENCOUNTER — TELEPHONE (OUTPATIENT)
Dept: FAMILY MEDICINE | Facility: CLINIC | Age: 72
End: 2022-11-28

## 2022-11-28 VITALS
HEART RATE: 71 BPM | DIASTOLIC BLOOD PRESSURE: 58 MMHG | SYSTOLIC BLOOD PRESSURE: 116 MMHG | WEIGHT: 185.88 LBS | OXYGEN SATURATION: 99 % | BODY MASS INDEX: 35.09 KG/M2 | HEIGHT: 61 IN

## 2022-11-28 DIAGNOSIS — Z12.31 BREAST CANCER SCREENING BY MAMMOGRAM: ICD-10-CM

## 2022-11-28 DIAGNOSIS — M85.88 OTHER SPECIFIED DISORDERS OF BONE DENSITY AND STRUCTURE, OTHER SITE: ICD-10-CM

## 2022-11-28 DIAGNOSIS — M85.80 OSTEOPENIA, UNSPECIFIED LOCATION: ICD-10-CM

## 2022-11-28 DIAGNOSIS — E89.0 POSTABLATIVE HYPOTHYROIDISM: ICD-10-CM

## 2022-11-28 DIAGNOSIS — I10 ESSENTIAL HYPERTENSION: ICD-10-CM

## 2022-11-28 DIAGNOSIS — E11.22 DIABETES MELLITUS WITH STAGE 3 CHRONIC KIDNEY DISEASE: Primary | ICD-10-CM

## 2022-11-28 DIAGNOSIS — N18.30 DIABETES MELLITUS WITH STAGE 3 CHRONIC KIDNEY DISEASE: Primary | ICD-10-CM

## 2022-11-28 PROCEDURE — 4010F PR ACE/ARB THEARPY RXD/TAKEN: ICD-10-PCS | Mod: CPTII,S$GLB,, | Performed by: FAMILY MEDICINE

## 2022-11-28 PROCEDURE — 99214 PR OFFICE/OUTPT VISIT, EST, LEVL IV, 30-39 MIN: ICD-10-PCS | Mod: S$GLB,,, | Performed by: FAMILY MEDICINE

## 2022-11-28 PROCEDURE — 1126F PR PAIN SEVERITY QUANTIFIED, NO PAIN PRESENT: ICD-10-PCS | Mod: CPTII,S$GLB,, | Performed by: FAMILY MEDICINE

## 2022-11-28 PROCEDURE — 1159F MED LIST DOCD IN RCRD: CPT | Mod: CPTII,S$GLB,, | Performed by: FAMILY MEDICINE

## 2022-11-28 PROCEDURE — 1126F AMNT PAIN NOTED NONE PRSNT: CPT | Mod: CPTII,S$GLB,, | Performed by: FAMILY MEDICINE

## 2022-11-28 PROCEDURE — 3008F BODY MASS INDEX DOCD: CPT | Mod: CPTII,S$GLB,, | Performed by: FAMILY MEDICINE

## 2022-11-28 PROCEDURE — 3066F PR DOCUMENTATION OF TREATMENT FOR NEPHROPATHY: ICD-10-PCS | Mod: CPTII,S$GLB,, | Performed by: FAMILY MEDICINE

## 2022-11-28 PROCEDURE — 3060F PR POS MICROALBUMINURIA RESULT DOCUMENTED/REVIEW: ICD-10-PCS | Mod: CPTII,S$GLB,, | Performed by: FAMILY MEDICINE

## 2022-11-28 PROCEDURE — 3044F PR MOST RECENT HEMOGLOBIN A1C LEVEL <7.0%: ICD-10-PCS | Mod: CPTII,S$GLB,, | Performed by: FAMILY MEDICINE

## 2022-11-28 PROCEDURE — 3074F PR MOST RECENT SYSTOLIC BLOOD PRESSURE < 130 MM HG: ICD-10-PCS | Mod: CPTII,S$GLB,, | Performed by: FAMILY MEDICINE

## 2022-11-28 PROCEDURE — 3288F PR FALLS RISK ASSESSMENT DOCUMENTED: ICD-10-PCS | Mod: CPTII,S$GLB,, | Performed by: FAMILY MEDICINE

## 2022-11-28 PROCEDURE — 3072F LOW RISK FOR RETINOPATHY: CPT | Mod: CPTII,S$GLB,, | Performed by: FAMILY MEDICINE

## 2022-11-28 PROCEDURE — 1159F PR MEDICATION LIST DOCUMENTED IN MEDICAL RECORD: ICD-10-PCS | Mod: CPTII,S$GLB,, | Performed by: FAMILY MEDICINE

## 2022-11-28 PROCEDURE — 3008F PR BODY MASS INDEX (BMI) DOCUMENTED: ICD-10-PCS | Mod: CPTII,S$GLB,, | Performed by: FAMILY MEDICINE

## 2022-11-28 PROCEDURE — 3072F PR LOW RISK FOR RETINOPATHY: ICD-10-PCS | Mod: CPTII,S$GLB,, | Performed by: FAMILY MEDICINE

## 2022-11-28 PROCEDURE — 3288F FALL RISK ASSESSMENT DOCD: CPT | Mod: CPTII,S$GLB,, | Performed by: FAMILY MEDICINE

## 2022-11-28 PROCEDURE — 99999 PR PBB SHADOW E&M-EST. PATIENT-LVL IV: CPT | Mod: PBBFAC,,, | Performed by: FAMILY MEDICINE

## 2022-11-28 PROCEDURE — 99999 PR PBB SHADOW E&M-EST. PATIENT-LVL IV: ICD-10-PCS | Mod: PBBFAC,,, | Performed by: FAMILY MEDICINE

## 2022-11-28 PROCEDURE — 99214 OFFICE O/P EST MOD 30 MIN: CPT | Mod: S$GLB,,, | Performed by: FAMILY MEDICINE

## 2022-11-28 PROCEDURE — 3078F DIAST BP <80 MM HG: CPT | Mod: CPTII,S$GLB,, | Performed by: FAMILY MEDICINE

## 2022-11-28 PROCEDURE — 1101F PT FALLS ASSESS-DOCD LE1/YR: CPT | Mod: CPTII,S$GLB,, | Performed by: FAMILY MEDICINE

## 2022-11-28 PROCEDURE — 3078F PR MOST RECENT DIASTOLIC BLOOD PRESSURE < 80 MM HG: ICD-10-PCS | Mod: CPTII,S$GLB,, | Performed by: FAMILY MEDICINE

## 2022-11-28 PROCEDURE — 3044F HG A1C LEVEL LT 7.0%: CPT | Mod: CPTII,S$GLB,, | Performed by: FAMILY MEDICINE

## 2022-11-28 PROCEDURE — 1101F PR PT FALLS ASSESS DOC 0-1 FALLS W/OUT INJ PAST YR: ICD-10-PCS | Mod: CPTII,S$GLB,, | Performed by: FAMILY MEDICINE

## 2022-11-28 PROCEDURE — 3074F SYST BP LT 130 MM HG: CPT | Mod: CPTII,S$GLB,, | Performed by: FAMILY MEDICINE

## 2022-11-28 PROCEDURE — 3060F POS MICROALBUMINURIA REV: CPT | Mod: CPTII,S$GLB,, | Performed by: FAMILY MEDICINE

## 2022-11-28 PROCEDURE — 4010F ACE/ARB THERAPY RXD/TAKEN: CPT | Mod: CPTII,S$GLB,, | Performed by: FAMILY MEDICINE

## 2022-11-28 PROCEDURE — 3066F NEPHROPATHY DOC TX: CPT | Mod: CPTII,S$GLB,, | Performed by: FAMILY MEDICINE

## 2022-11-28 NOTE — PROGRESS NOTES
(Portions of this note were dictated using voice recognition software and may contain dictation related errors in spelling/grammar/syntax not found on text review)    CC:   Follow-up labs    HPI: 72 y.o. female annual exam in May of 2022.  Additionally followed by endocrinology for diabetes and hypothyroidism.    Hypertension on losartan 100 mg daily  amlodipine 5 mg daily  .  BP stable at home usually 130s over 60s.  Has lost significant amount of weight intentionally through healthy diet.  Was on hydrochlorothiazide but given decreasing renal function as below, she was advised to stay off this.  Blood pressure stable even off thiazide.  Does not take any NSAIDs.  Renal function shows slightly improved GFR of 37 up from 32 last month when she was on the thiazide.     Hyperlipidemia, Crestor 40 mg daily.      Diabetes with CKD 3, controlled on metformin 500 b.i.d. has had down trending GFR recently 37.3.  Protein creatinine ratio is elevated at 1.47.  Most recently on BMP had elevated calcium 10.6, albumin not reported, prior CMP show calcium 10.1, albumin 3.6.  Has had normal vitamin-D level in September     Hypothyroidism on Synthroid, currently on 88 mcg.  Last TSH in October 2022 was normal     Meralgia paresthetica bilaterally, discussed at prior visits, would get numbness sensation bilateral outer thighs, no radiating. .  Takes gabapentin titrated to 300 mg t.i.d if needed.  This is gotten a lot better however that she has lost a significant amount of weight.  She is not really exercising very much but she is really trying to cut down on her food intake.  She denies any symptoms of  hyperthyroidism      Past Medical History:   Diagnosis Date    Cataract     CKD (chronic kidney disease) stage 3, GFR 30-59 ml/min 3/21/2014    Diabetes mellitus type II     GERD (gastroesophageal reflux disease)     Glaucoma (increased eye pressure)     HLD (hyperlipidemia)     HTN (hypertension)     Hypothyroidism     Morbid  obesity 3/21/2014    Osteopenia     Vitamin D deficiency 3/21/2014       Past Surgical History:   Procedure Laterality Date     SECTION, LOW TRANSVERSE         Family History   Problem Relation Age of Onset    Coronary artery disease Mother         premature, 57    No Known Problems Father     Cancer Sister     Breast cancer Sister 33    No Known Problems Maternal Aunt     Glaucoma Maternal Uncle     Diabetes Maternal Uncle     No Known Problems Paternal Aunt     No Known Problems Paternal Uncle     No Known Problems Maternal Grandmother     No Known Problems Maternal Grandfather     No Known Problems Paternal Grandmother     No Known Problems Paternal Grandfather     No Known Problems Brother     Diabetes Other     Hypertension Other     Thyroid disease Other     Amblyopia Neg Hx     Blindness Neg Hx     Cataracts Neg Hx     Macular degeneration Neg Hx     Retinal detachment Neg Hx     Strabismus Neg Hx     Stroke Neg Hx        Social History     Socioeconomic History    Marital status:    Tobacco Use    Smoking status: Former     Types: Cigarettes    Smokeless tobacco: Never    Tobacco comments:     occasionally   Substance and Sexual Activity    Alcohol use: No    Drug use: No       Lab Results   Component Value Date    WBC 8.31 2022    HGB 11.8 (L) 2022    HCT 37.1 2022    MCV 86 2022     2022    CHOL 75 (L) 2022    TRIG 65 2022    HDL 28 (L) 2022    ALT 22 10/17/2022    AST 21 10/17/2022    BILITOT 0.6 10/17/2022    ALKPHOS 76 10/17/2022     2022    K 4.2 2022     2022    CREATININE 1.5 (H) 2022    ESTGFRAFRICA 53.3 (A) 2022    EGFRNONAA 46.2 (A) 2022    CALCIUM 10.6 (H) 2022    ALBUMIN 3.6 10/17/2022    BUN 17 2022    CO2 24 2022    TSH 1.433 10/17/2022    INR 1.1 2005    HGBA1C 5.4 10/17/2022    MICALBCREAT 82.5 (H) 2022    LDLCALC 34.0 (L) 2022    GLU 99  11/21/2022             Hemoglobin A1C (%)   Date Value   10/17/2022 5.4   09/08/2022 5.3   05/30/2022 5.4   09/01/2021 6.1 (H)   05/26/2021 5.9 (H)   07/14/2020 5.7 (H)     eGFR if non African American (mL/min/1.73 m^2)   Date Value   05/30/2022 46.2 (A)   09/01/2021 35.3 (A)   05/26/2021 51.7 (A)   02/26/2020 47.4 (A)   11/16/2018 44.5 (A)   06/16/2017 48.4 (A)   03/07/2017 44.7 (A)   11/03/2016 48 (A)   06/02/2016 53.6 (A)   11/02/2015 >60     eGFR (mL/min/1.73 m^2)   Date Value   11/21/2022 37.3 (A)   10/17/2022 32.1 (A)   09/08/2022 44.2 (A)                   Vital signs reviewed  PE:   APPEARANCE: Well nourished, well developed, in no acute distress.    HEAD: Normocephalic, atraumatic.  NECK: Supple with no cervical lymphadenopathy.  No carotid bruits.  No thyromegaly  CHEST: Good inspiratory effort. Lungs clear to auscultation with no wheezes or crackles.  CARDIOVASCULAR: Normal S1, S2. No rubs, murmurs, or gallops.  ABDOMEN: Bowel sounds normal. Not distended. Soft. No tenderness or masses. No organomegaly.  EXTREMITIES: No edema   DIABETIC FOOT EXAM:  Up-to-date August 25, 2022    IMPRESSION  1. Diabetes mellitus with stage 3 chronic kidney disease    2. Postablative hypothyroidism    3. Essential hypertension    4. Osteopenia, unspecified location    5. Other specified disorders of bone density and structure, other site    6. Breast cancer screening by mammogram          PLAN  Orders Placed This Encounter   Procedures    DXA Bone Density Spine And Hip    Mammo Digital Screening Bilat    CBC Auto Differential    Comprehensive Metabolic Panel    Hemoglobin A1C    TSH    Lipid Panel    Protein / creatinine ratio, urine        Diabetes health maintenance:  -- advise regular and consistent glucose monitoring and medication compliance.  -- advise daily foot checks  -- advise yearly ophthalmologic exams (she is due for eye exam, could not get appointment until February 2023)  -- advise adequate dietary and  exercise modification  -- advise regular dental visits  -- advise daily low-dose aspirin use if patient is not on other anticoagulants and there are no other contraindications.  -- Medication management:  Continue metformin 500 b.i.d.    Dyslipidemia:  Continue Crestor    Hypothyroidism:  Continue Synthroid 88 mcg daily.  Trend TSH    Hypertension:  Continue losartan 100 mg daily,amlodipine 5 mg daily.  BP stable, recheck at 130/50.  She overall feels quite well.  She is off thiazide now for the last month.  Renal function has improved slightly since being off thiazide.  Renal ultrasound normal.  Encourage adequate hydration.  Update labs above in 3 months with visit to follow.  Continue blood pressure checks at home.    Will update DEXA given osteopenia.  Mammogram order also placed to be done after March 2023               HEALTH SCREENINGS  Immunization:  Tdap in 2015   Pneumovax : 9/24/12, 2018  Flu: utd  Pcv:  09/22/2017  CoVID vaccine booster is scheduled (bivalent)    Shingles vaccine up-to-date  Mammogram 03/24/22, reordered for March 2023     Pap:2016     Colonoscopy 2008. Multiple small hyperplastic polyps removed. Repeat in 7-10 years, rpt screening fit was negative on 6/2/22    DEXA scan:  02/26/2020.  Osteopenia:  L-spine T-score is-1.6.  Left Femoral neck T-score is-1.4.  Right femoral neck T-score -1.5, update order

## 2022-12-26 RX ORDER — AMLODIPINE BESYLATE 5 MG/1
TABLET ORAL
Qty: 90 TABLET | Refills: 3 | Status: SHIPPED | OUTPATIENT
Start: 2022-12-26 | End: 2023-11-27 | Stop reason: SDUPTHER

## 2022-12-26 NOTE — TELEPHONE ENCOUNTER
No new care gaps identified.  [facility] Embedded Care Gaps. Reference number: [id number]. 12/26/2022   11:32:27 AM CST   Problem: Mobility Impaired (Adult and Pediatric)  Goal: *Acute Goals and Plan of Care (Insert Text)  Physical Therapy Goals  Initiated 7/12/2018  1. Patient will move from supine to sit and sit to supine  in bed with modified independence within 7 day(s). 2.  Patient will transfer from bed to chair and chair to bed with modified independence using the least restrictive device within 7 day(s). 3.  Patient will perform sit to stand with modified independence within 7 day(s). 4.  Patient will ambulate with modified independence for 150 feet with the least restrictive device within 7 day(s). 5.  Patient will ascend/descend 3 stairs with 1 handrail(s) with modified independence within 7 day(s). physical Therapy EVALUATION  Patient: Jocy Pretty Sr. (82 y.o. male)  Date: 7/12/2018  Primary Diagnosis: Dizziness        Precautions:   Fall    ASSESSMENT :  Based on the objective data described below, the patient presents with decreased functional mobility from baseline level of function. At baseline lives alone in a 1 level home with approx 3 LUKASZ. Amb with RW at baseline and has intermittent dizziness per his report. Patient currently needing CGA-Shamir for bed mobility. Sit to stand with Shamir and cues for technique. Amb approx 25 feet with RW and CGA with slow lynette and decreased step clearance but no overt LOB. Patient returned to room and positioned in bed secondary to flank pain. RN notified regarding the pain. Based on current level of function recommend SNF rehab vs [facility] PT pending progress. Patient will benefit from skilled intervention to address the above impairments.   Patients rehabilitation potential is considered to be Good  Factors which may influence rehabilitation potential include:   [x]         None noted  []         Mental ability/status  []         Medical condition  []         Home/family situation and support systems  []         Safety awareness  []         Pain tolerance/management  []         Other:      PLAN :  Recommendations and Planned Interventions:  [x]           Bed Mobility Training             [x]    Neuromuscular Re-Education  [x]           Transfer Training                   []    Orthotic/Prosthetic Training  [x]           Gait Training                         []    Modalities  [x]           Therapeutic Exercises           []    Edema Management/Control  [x]           Therapeutic Activities            [x]    Patient and Family Training/Education  []           Other (comment):    Frequency/Duration: Patient will be followed by physical therapy  5 times a week to address goals. Discharge Recommendations: Home Health and [address] Recommendations for Discharge: TBD     SUBJECTIVE:   Patient stated I've had lots of tests today so I am pretty weak.     OBJECTIVE DATA SUMMARY:   HISTORY:    Past Medical History:   Diagnosis Date    CAD (coronary artery disease)     Heart failure ([facility] [address]     Hypertension      Past Surgical History:   Procedure Laterality Date    CARDIAC SURG PROCEDURE UNLIST      cath w/stent    HX ORTHOPAEDIC      bilateral hip replacement    HX PACEMAKER       Prior Level of Function/Home Situation: Independent with mobility at baseline.   Lives alone and uses RW  Personal factors and/or comorbidities impacting plan of care:     Home Situation  Home Environment: Private residence  # Steps to Enter: 3  Rails to Enter: Yes  Hand Rails : Bilateral  One/Two Story Residence: One story  Living Alone: Yes  Support Systems: Child(gale), Family member(s)  Patient Expects to be Discharged to[de-identified] Private residence  Current DME Used/Available at Home: Walker, rolling    EXAMINATION/PRESENTATION/DECISION MAKING:   Critical Behavior:  Neurologic State: Alert  Orientation Level: Oriented X4  Cognition: Appropriate decision making, Appropriate for age attention/concentration, Appropriate safety awareness, Follows commands Hearing: Auditory  Auditory Impairment: None    Range Of Motion:  AROM: Generally decreased, functional                       Strength:    Strength: Generally decreased, functional        Functional Mobility:  Bed Mobility:  Rolling: Supervision  Supine to Sit: Contact guard assistance;Assist x1  Sit to Supine: Minimum assistance  Scooting: Supervision  Transfers:  Sit to Stand: Minimum assistance;Assist x1  Stand to Sit: Minimum assistance;Assist x1        Bed to Chair: Minimum assistance              Balance:   Sitting: Intact  Standing: Impaired  Standing - Static: Constant support;Good  Standing - Dynamic : Fair  Ambulation/Gait Training:  Distance (ft): 25 Feet (ft)  Assistive Device: Gait belt;Walker, rolling  Ambulation - Level of Assistance: Contact guard assistance;Assist x1     Gait Description (WDL): Exceptions to WDL  Gait Abnormalities: Antalgic;Decreased step clearance;Shuffling gait        Base of Support: Narrowed     Speed/Evelina: Pace decreased (<100 feet/min); Slow  Step Length: Left shortened;Right shortened       Functional Measure:  Barthel Index:    Bathin  Bladder: 5  Bowels: 5  Groomin  Dressin  Feeding: 10  Mobility: 0  Stairs: 0  Toilet Use: 5  Transfer (Bed to Chair and Back): 10  Total: 40       Barthel and G-code impairment scale:  Percentage of impairment CH  0% CI  1-19% CJ  20-39% CK  40-59% CL  60-79% CM  80-99% CN  100%   Barthel Score 0-100 100 99-80 79-60 59-40 20-39 1-19   0   Barthel Score 0-20 20 17-19 13-16 9-12 5-8 1-4 0      The Barthel ADL Index: Guidelines  1. The index should be used as a record of what a patient does, not as a record of what a patient could do. 2. The main aim is to establish degree of independence from any help, physical or verbal, however minor and for whatever reason. 3. The need for supervision renders the patient not independent. 4. A patient's performance should be established using the best available evidence.  Asking the patient, friends/relatives and nurses are the usual sources, but direct observation and common sense are also important. However direct testing is not needed. 5. Usually the patient's performance over the preceding 24-48 hours is important, but occasionally longer periods will be relevant. 6. Middle categories imply that the patient supplies over 50 per cent of the effort. 7. Use of aids to be independent is allowed. [place]., Barthel, D.W. (5666). Functional evaluation: the Barthel Index. [address] [facility] (14)2. Thanh Gill maurilio MELISSA StewartF, Eryn Mchugh., Marc Barrett., [facility], [address] [facility] (1999). Measuring the change indisability after inpatient rehabilitation; comparison of the responsiveness of the Barthel Index and Functional Gladys Measure. Journal of Neurology, Neurosurgery, and Psychiatry, 66(4), 719-747. OTONIEL JoyA, BRUCE Ornelas, & Lionel So M.A. (2004.) Assessment of post-stroke quality of life in cost-effectiveness studies: The usefulness of the Barthel Index and the EuroQoL-5D. Quality of Life Research, 13, 750-40         G codes: In compliance with CMSs Claims Based Outcome Reporting, the following G-code set was chosen for this patient based on their primary functional limitation being treated: The outcome measure chosen to determine the severity of the functional limitation was the Barthel with a score of 40/100 which was correlated with the impairment scale. ? Mobility - Walking and Moving Around:     - CURRENT STATUS: CK - 40%-59% impaired, limited or restricted    - GOAL STATUS: CJ - 20%-39% impaired, limited or restricted    - D/C STATUS:  ---------------To be determined---------------        Pain:  Pain Scale 1: Numeric (0 - 10)  Pain Intensity 1: 0              Activity Tolerance:   VSS  Please refer to the flowsheet for vital signs taken during this treatment.   After treatment:   []         Patient left in no apparent distress sitting up in chair  [x]         Patient left in no apparent distress in bed  [x]         Call bell left within reach  [x]         Nursing notified  []         Caregiver present  []         Bed alarm activated    COMMUNICATION/EDUCATION:   The patients plan of care was discussed with: Physical Therapist, Occupational Therapist and Registered Nurse. [x]         Fall prevention education was provided and the patient/caregiver indicated understanding. [x]         Patient/family have participated as able in goal setting and plan of care. [x]         Patient/family agree to work toward stated goals and plan of care. []         Patient understands intent and goals of therapy, but is neutral about his/her participation. []         Patient is unable to participate in goal setting and plan of care.     Thank you for this referral.  Ioana Hernandez, PT, DPT   Time Calculation: 15 mins

## 2022-12-26 NOTE — TELEPHONE ENCOUNTER
Refill Decision Note   Hunter Cisneros  is requesting a refill authorization.  Brief Assessment and Rationale for Refill:  Approve     Medication Therapy Plan:       Medication Reconciliation Completed: No   Comments:     No Care Gaps recommended.     Note composed:5:59 PM 12/26/2022

## 2023-02-01 ENCOUNTER — OFFICE VISIT (OUTPATIENT)
Dept: OPTOMETRY | Facility: CLINIC | Age: 73
End: 2023-02-01
Payer: COMMERCIAL

## 2023-02-01 ENCOUNTER — CLINICAL SUPPORT (OUTPATIENT)
Dept: OPHTHALMOLOGY | Facility: CLINIC | Age: 73
End: 2023-02-01
Payer: COMMERCIAL

## 2023-02-01 DIAGNOSIS — E11.9 DIABETIC EYE EXAM: Primary | ICD-10-CM

## 2023-02-01 DIAGNOSIS — H52.203 HYPEROPIA WITH ASTIGMATISM AND PRESBYOPIA, BILATERAL: ICD-10-CM

## 2023-02-01 DIAGNOSIS — H53.40 VISUAL FIELD DEFECT: ICD-10-CM

## 2023-02-01 DIAGNOSIS — H25.13 NUCLEAR SCLEROSIS, BILATERAL: ICD-10-CM

## 2023-02-01 DIAGNOSIS — E11.9 TYPE 2 DIABETES MELLITUS WITHOUT RETINOPATHY: ICD-10-CM

## 2023-02-01 DIAGNOSIS — H52.03 HYPEROPIA WITH ASTIGMATISM AND PRESBYOPIA, BILATERAL: ICD-10-CM

## 2023-02-01 DIAGNOSIS — H26.9 CORTICAL CATARACT OF BOTH EYES: ICD-10-CM

## 2023-02-01 DIAGNOSIS — H52.4 HYPEROPIA WITH ASTIGMATISM AND PRESBYOPIA, BILATERAL: ICD-10-CM

## 2023-02-01 DIAGNOSIS — H40.053 OCULAR HYPERTENSION, BILATERAL: ICD-10-CM

## 2023-02-01 DIAGNOSIS — Z01.00 DIABETIC EYE EXAM: Primary | ICD-10-CM

## 2023-02-01 PROCEDURE — 1125F AMNT PAIN NOTED PAIN PRSNT: CPT | Mod: CPTII,S$GLB,, | Performed by: OPTOMETRIST

## 2023-02-01 PROCEDURE — 2023F DILAT RTA XM W/O RTNOPTHY: CPT | Mod: CPTII,S$GLB,, | Performed by: OPTOMETRIST

## 2023-02-01 PROCEDURE — 4010F PR ACE/ARB THEARPY RXD/TAKEN: ICD-10-PCS | Mod: CPTII,S$GLB,, | Performed by: OPTOMETRIST

## 2023-02-01 PROCEDURE — 1159F MED LIST DOCD IN RCRD: CPT | Mod: CPTII,S$GLB,, | Performed by: OPTOMETRIST

## 2023-02-01 PROCEDURE — 99999 PR PBB SHADOW E&M-EST. PATIENT-LVL IV: ICD-10-PCS | Mod: PBBFAC,,, | Performed by: OPTOMETRIST

## 2023-02-01 PROCEDURE — 92014 COMPRE OPH EXAM EST PT 1/>: CPT | Mod: S$GLB,,, | Performed by: OPTOMETRIST

## 2023-02-01 PROCEDURE — 1101F PR PT FALLS ASSESS DOC 0-1 FALLS W/OUT INJ PAST YR: ICD-10-PCS | Mod: CPTII,S$GLB,, | Performed by: OPTOMETRIST

## 2023-02-01 PROCEDURE — 3288F PR FALLS RISK ASSESSMENT DOCUMENTED: ICD-10-PCS | Mod: CPTII,S$GLB,, | Performed by: OPTOMETRIST

## 2023-02-01 PROCEDURE — 1101F PT FALLS ASSESS-DOCD LE1/YR: CPT | Mod: CPTII,S$GLB,, | Performed by: OPTOMETRIST

## 2023-02-01 PROCEDURE — 92015 DETERMINE REFRACTIVE STATE: CPT | Mod: S$GLB,,, | Performed by: OPTOMETRIST

## 2023-02-01 PROCEDURE — 1125F PR PAIN SEVERITY QUANTIFIED, PAIN PRESENT: ICD-10-PCS | Mod: CPTII,S$GLB,, | Performed by: OPTOMETRIST

## 2023-02-01 PROCEDURE — 99999 PR PBB SHADOW E&M-EST. PATIENT-LVL IV: CPT | Mod: PBBFAC,,, | Performed by: OPTOMETRIST

## 2023-02-01 PROCEDURE — 92014 PR EYE EXAM, EST PATIENT,COMPREHESV: ICD-10-PCS | Mod: S$GLB,,, | Performed by: OPTOMETRIST

## 2023-02-01 PROCEDURE — 1159F PR MEDICATION LIST DOCUMENTED IN MEDICAL RECORD: ICD-10-PCS | Mod: CPTII,S$GLB,, | Performed by: OPTOMETRIST

## 2023-02-01 PROCEDURE — 3288F FALL RISK ASSESSMENT DOCD: CPT | Mod: CPTII,S$GLB,, | Performed by: OPTOMETRIST

## 2023-02-01 PROCEDURE — 2023F PR DILATED RETINAL EXAM W/O EVID OF RETINOPATHY: ICD-10-PCS | Mod: CPTII,S$GLB,, | Performed by: OPTOMETRIST

## 2023-02-01 PROCEDURE — 92015 PR REFRACTION: ICD-10-PCS | Mod: S$GLB,,, | Performed by: OPTOMETRIST

## 2023-02-01 PROCEDURE — 4010F ACE/ARB THERAPY RXD/TAKEN: CPT | Mod: CPTII,S$GLB,, | Performed by: OPTOMETRIST

## 2023-02-01 NOTE — PROGRESS NOTES
HPI     diabetic eye exam            Comments: Annual diabetic eye exam and refraction.  No acute ocular/vision problems.  Wears glasses full-time  HVF test (24-2) done today,           Comments    Patient's age: 72 y.o. AA female   Occupation: Retired  Approximate date of last eye examination:  11/17/2021  Name of last eye doctor seen: Dr. Aquino  City/State: [facility]  Wears glasses? Yes     If yes, wears  Full-time or part-time?  Full-time  Present glasses are: Bifocal, SV Distance, SV Reading?  Progressive  Approximate age of present glasses:  1 year + three months   Got new glasses following last exam, or subsequently?:  Yes (!)   Any problem with VA with glasses?  No  Wears CLs?:  No  Headaches?  No  Eye pain/discomfort?  No                                                                                     Flashes?  No  Floaters?  No  Diplopia/Double vision?  No  Patient's Ocular History:         Any eye surgeries? No         Any eye injury?  No         Any treatment for eye disease?  No  Family history of eye disease? Glaucoma and Cataracts (Maternal Aunts and   Uncles)  Significant patient medical history:         1. Diabetes?  Yes - type 2 x 10 + years       If yes, IDDM or NIDDM? NIDDM (taking Metformin)   2. HBP?  Yes - takes meds - states well controlled.               3. Other (describe):  N/A   ! OTC eyedrops currently using:  No   ! Prescription eye meds currently using:  Yes Timolol 0.5% qAM OU and   Latanoprost 0.005% qPM OU   ! Any history of allergy/adverse reaction to any eye meds used   previously?  No    ! Any history of allergy/adverse reaction to eyedrops used during prior   eye exam(s)? No    ! Any history of allergy/adverse reaction to Novacaine or similar meds?   No   ! Any history of allergy/adverse reaction to Epinephrine or similar meds?   No    ! Patient okay with use of anesthetic eyedrops to check eye pressure?    Yes         ! Patient okay with use of eyedrops to dilate pupils today?   Yes   !  Allergies/Medications/Medical History/Family History reviewed today?    Yes      PD =   69/65  Desired reading distance =  14.5  Approx computer distance =                                                                         Last edited by Cesario Aquino, OD on 2/1/2023 11:46 AM.            Assessment /Plan     For exam results, see Encounter Report.    1. Diabetic eye exam        2. Type 2 diabetes mellitus without retinopathy        3. Ocular hypertension, bilateral        4. Visual field defect        5. Nuclear sclerosis, bilateral        6. Cortical cataract of both eyes        7. Hyperopia with astigmatism and presbyopia, bilateral                   Nuclear sclerotic/cortical cataract in both eyes.  Still no need for cataract surgery in either eye, based on the best-corrected VA achieved with refraction today.  Continue to monitor     Type 2 diabetes without evidence of diabetic retinopathy in either eye.     Prior diagnosis of ocular hypertension.  No evidence of glaucomatous changes in the appearance of the optic nerve head in either eye.  Using timolol maleate 0.5% ophthalmic solution every morning in both eyes, and Latanoprost 0.005% ophthalmic solution every evening in both eyes for IOP control.  Intraocular pressure remains within normal range in each eye today (14 mm Hg in the right eye  and 13 mm Hg in the left eye)     HVF test (24-2 EMANUEL Standard) done today..   Test results appear non-reliable OD and OS, secondary to fixation losses and excessively high false positive responses in each eye.    SUGGEST REPEAT HVF test (24-2) IN FOUR TO SIX MONTHS.  Will send recall notice in the mail      Continue Latanoprost 0.005% every evening in both eyes, and continue Timolol Maleate 0.5% every morning in both eyes.     Hyperopia with astigmatism in each eye, with satisfactory best-corrected VA in each eye. Presbyopia consistent with age   New spectacle lens Rx issued for full-time wear.       Return for repeat HVF in six months, as noted above.  Repeat refraction in one year

## 2023-02-01 NOTE — PROGRESS NOTES
Visual field test done.  Patient stated no latex allergies used coverlet      Date: 2/1/2023      Glasses Prescription     Sphere Cylinder Axis Dist VA Add   Right +3.50 +3.00 178 20/25-1 +2.25   Left +3.75 +4.50 010 20/25 (-) +2.25   Comments: ST bifocal, OR single vision distance lenses and/or single vision reading lenses.   ST trifocal okay, if desired.       If progressives, +2.50 D add OU

## 2023-02-01 NOTE — PATIENT INSTRUCTIONS
Nuclear sclerotic/cortical cataract in both eyes.  Still no need for cataract surgery in either eye, based on the best-corrected VA achieved with refraction today.  Continue to monitor     Type 2 diabetes without evidence of diabetic retinopathy in either eye.     Prior diagnosis of ocular hypertension.  No evidence of glaucomatous changes in the appearance of the optic nerve head in either eye.  Using timolol maleate 0.5% ophthalmic solution every morning in both eyes, and Latanoprost 0.005% ophthalmic solution every evening in both eyes for IOP control.  Intraocular pressure remains within normal range in each eye today (14 mm Hg in the right eye  and 13 mm Hg in the left eye)     HVF test (24-2 EMANUEL Standard) done today..   Test results appear non-reliable OD and OS, secondary to fixation losses and excessively high false positive responses in each eye.    SUGGEST REPEAT HVF test (24-2) IN FOUR TO SIX MONTHS.  Will send recall notice in the mail      Continue Latanoprost 0.005% every evening in both eyes, and continue Timolol Maleate 0.5% every morning in both eyes.     Hyperopia with astigmatism in each eye, with satisfactory best-corrected VA in each eye. Presbyopia consistent with age   New spectacle lens Rx issued for full-time wear.      Return for repeat HVF in six months, as noted above.  Repeat refraction in one year

## 2023-02-27 ENCOUNTER — HOSPITAL ENCOUNTER (OUTPATIENT)
Dept: RADIOLOGY | Facility: CLINIC | Age: 73
Discharge: HOME | End: 2023-02-27
Attending: FAMILY MEDICINE
Payer: COMMERCIAL

## 2023-02-27 DIAGNOSIS — M85.80 OSTEOPENIA, UNSPECIFIED LOCATION: ICD-10-CM

## 2023-02-27 DIAGNOSIS — M85.88 OTHER SPECIFIED DISORDERS OF BONE DENSITY AND STRUCTURE, OTHER SITE: ICD-10-CM

## 2023-02-27 PROCEDURE — 77080 DEXA BONE DENSITY SPINE HIP: ICD-10-PCS | Mod: 26,,, | Performed by: RADIOLOGY

## 2023-02-27 PROCEDURE — 77080 DXA BONE DENSITY AXIAL: CPT | Mod: TC,PO

## 2023-02-27 PROCEDURE — 77080 DXA BONE DENSITY AXIAL: CPT | Mod: 26,,, | Performed by: RADIOLOGY

## 2023-03-02 RX ORDER — TIMOLOL MALEATE 5 MG/ML
SOLUTION OPHTHALMIC
Qty: 5 ML | Refills: 5 | Status: SHIPPED | OUTPATIENT
Start: 2023-03-02

## 2023-03-06 ENCOUNTER — OFFICE VISIT (OUTPATIENT)
Dept: FAMILY MEDICINE | Facility: CLINIC | Age: 73
End: 2023-03-06
Payer: COMMERCIAL

## 2023-03-06 VITALS
BODY MASS INDEX: 35.26 KG/M2 | DIASTOLIC BLOOD PRESSURE: 54 MMHG | HEIGHT: 61 IN | SYSTOLIC BLOOD PRESSURE: 118 MMHG | OXYGEN SATURATION: 100 % | WEIGHT: 186.75 LBS | TEMPERATURE: 98 F | HEART RATE: 63 BPM

## 2023-03-06 DIAGNOSIS — N18.30 DIABETES MELLITUS WITH STAGE 3 CHRONIC KIDNEY DISEASE: Primary | ICD-10-CM

## 2023-03-06 DIAGNOSIS — E11.22 DIABETES MELLITUS WITH STAGE 3 CHRONIC KIDNEY DISEASE: Primary | ICD-10-CM

## 2023-03-06 DIAGNOSIS — I10 ESSENTIAL HYPERTENSION: ICD-10-CM

## 2023-03-06 DIAGNOSIS — E89.0 POSTABLATIVE HYPOTHYROIDISM: ICD-10-CM

## 2023-03-06 DIAGNOSIS — M81.8 OTHER OSTEOPOROSIS WITHOUT CURRENT PATHOLOGICAL FRACTURE: ICD-10-CM

## 2023-03-06 DIAGNOSIS — E66.01 SEVERE OBESITY (BMI 35.0-35.9 WITH COMORBIDITY): ICD-10-CM

## 2023-03-06 PROBLEM — M81.0 OSTEOPOROSIS: Status: ACTIVE | Noted: 2023-03-06

## 2023-03-06 PROCEDURE — 3044F HG A1C LEVEL LT 7.0%: CPT | Mod: CPTII,S$GLB,, | Performed by: FAMILY MEDICINE

## 2023-03-06 PROCEDURE — 3008F BODY MASS INDEX DOCD: CPT | Mod: CPTII,S$GLB,, | Performed by: FAMILY MEDICINE

## 2023-03-06 PROCEDURE — 1159F MED LIST DOCD IN RCRD: CPT | Mod: CPTII,S$GLB,, | Performed by: FAMILY MEDICINE

## 2023-03-06 PROCEDURE — 3008F PR BODY MASS INDEX (BMI) DOCUMENTED: ICD-10-PCS | Mod: CPTII,S$GLB,, | Performed by: FAMILY MEDICINE

## 2023-03-06 PROCEDURE — 4010F PR ACE/ARB THEARPY RXD/TAKEN: ICD-10-PCS | Mod: CPTII,S$GLB,, | Performed by: FAMILY MEDICINE

## 2023-03-06 PROCEDURE — 3074F PR MOST RECENT SYSTOLIC BLOOD PRESSURE < 130 MM HG: ICD-10-PCS | Mod: CPTII,S$GLB,, | Performed by: FAMILY MEDICINE

## 2023-03-06 PROCEDURE — 3044F PR MOST RECENT HEMOGLOBIN A1C LEVEL <7.0%: ICD-10-PCS | Mod: CPTII,S$GLB,, | Performed by: FAMILY MEDICINE

## 2023-03-06 PROCEDURE — 1126F AMNT PAIN NOTED NONE PRSNT: CPT | Mod: CPTII,S$GLB,, | Performed by: FAMILY MEDICINE

## 2023-03-06 PROCEDURE — 3078F PR MOST RECENT DIASTOLIC BLOOD PRESSURE < 80 MM HG: ICD-10-PCS | Mod: CPTII,S$GLB,, | Performed by: FAMILY MEDICINE

## 2023-03-06 PROCEDURE — 1101F PT FALLS ASSESS-DOCD LE1/YR: CPT | Mod: CPTII,S$GLB,, | Performed by: FAMILY MEDICINE

## 2023-03-06 PROCEDURE — 3078F DIAST BP <80 MM HG: CPT | Mod: CPTII,S$GLB,, | Performed by: FAMILY MEDICINE

## 2023-03-06 PROCEDURE — 3288F FALL RISK ASSESSMENT DOCD: CPT | Mod: CPTII,S$GLB,, | Performed by: FAMILY MEDICINE

## 2023-03-06 PROCEDURE — 3074F SYST BP LT 130 MM HG: CPT | Mod: CPTII,S$GLB,, | Performed by: FAMILY MEDICINE

## 2023-03-06 PROCEDURE — 99214 OFFICE O/P EST MOD 30 MIN: CPT | Mod: S$GLB,,, | Performed by: FAMILY MEDICINE

## 2023-03-06 PROCEDURE — 1101F PR PT FALLS ASSESS DOC 0-1 FALLS W/OUT INJ PAST YR: ICD-10-PCS | Mod: CPTII,S$GLB,, | Performed by: FAMILY MEDICINE

## 2023-03-06 PROCEDURE — 99214 PR OFFICE/OUTPT VISIT, EST, LEVL IV, 30-39 MIN: ICD-10-PCS | Mod: S$GLB,,, | Performed by: FAMILY MEDICINE

## 2023-03-06 PROCEDURE — 3288F PR FALLS RISK ASSESSMENT DOCUMENTED: ICD-10-PCS | Mod: CPTII,S$GLB,, | Performed by: FAMILY MEDICINE

## 2023-03-06 PROCEDURE — 99999 PR PBB SHADOW E&M-EST. PATIENT-LVL V: ICD-10-PCS | Mod: PBBFAC,,, | Performed by: FAMILY MEDICINE

## 2023-03-06 PROCEDURE — 1126F PR PAIN SEVERITY QUANTIFIED, NO PAIN PRESENT: ICD-10-PCS | Mod: CPTII,S$GLB,, | Performed by: FAMILY MEDICINE

## 2023-03-06 PROCEDURE — 1159F PR MEDICATION LIST DOCUMENTED IN MEDICAL RECORD: ICD-10-PCS | Mod: CPTII,S$GLB,, | Performed by: FAMILY MEDICINE

## 2023-03-06 PROCEDURE — 99999 PR PBB SHADOW E&M-EST. PATIENT-LVL V: CPT | Mod: PBBFAC,,, | Performed by: FAMILY MEDICINE

## 2023-03-06 PROCEDURE — 4010F ACE/ARB THERAPY RXD/TAKEN: CPT | Mod: CPTII,S$GLB,, | Performed by: FAMILY MEDICINE

## 2023-03-06 RX ORDER — LOSARTAN POTASSIUM 50 MG/1
50 TABLET ORAL DAILY
Qty: 90 TABLET | Refills: 3 | Status: SHIPPED | OUTPATIENT
Start: 2023-03-06 | End: 2024-02-28

## 2023-03-06 NOTE — PATIENT INSTRUCTIONS
Start taking 50 mg of losartan daily (in case blood pressures are too low affecting kidneys)    Goal blood pressure should be less than 140/90    If going above this, may need to restart 100 mg daily    Continue your amlodipine      Osteoporosis: thin bones (especially left hip). To prevent fracture and improve bone density, will do every 6 month Prolia injections. I'll send a message to the infusion clinic and they will contact you to set this up.

## 2023-03-06 NOTE — Clinical Note
Zuhair Paula, new osteoporosis with CKD, she agreed to prolia, I put therapy plan in and she has cmp in chart and updated dxa. Thanks Lenny Javier MD

## 2023-03-06 NOTE — PROGRESS NOTES
(Portions of this note were dictated using voice recognition software and may contain dictation related errors in spelling/grammar/syntax not found on text review)    CC:   Follow-up labs    HPI: 72 y.o. female annual exam in May of 2022.  Additionally followed by endocrinology for diabetes and hypothyroidism.    Hypertension on losartan 100 mg daily  amlodipine 5 mg daily  .  BP stable .Has lost significant amount of weight intentionally through healthy diet.  Was on hydrochlorothiazide but given decreasing renal function as below, she was advised to stay off this.  Blood pressure stable even off thiazide.  Does not take any NSAIDs.       Hyperlipidemia, Crestor 40 mg daily.      Diabetes with CKD 3, controlled on metformin 500 b.i.d. has had down trending GFR recently 37.3 (improved slightly from 2022 but decreased from prior to that).  Protein creatinine ratio is elevated at 1.47.        Hypothyroidism on Synthroid, currently on 88 mcg.             Past Medical History:   Diagnosis Date    Cataract     CKD (chronic kidney disease) stage 3, GFR 30-59 ml/min 2014    Diabetes mellitus type II     GERD (gastroesophageal reflux disease)     Glaucoma (increased eye pressure)     HLD (hyperlipidemia)     HTN (hypertension)     Hypothyroidism     Morbid obesity 2014    Osteoporosis     Vitamin D deficiency 2014       Past Surgical History:   Procedure Laterality Date     SECTION, LOW TRANSVERSE         Family History   Problem Relation Age of Onset    Coronary artery disease Mother         premature, 57    No Known Problems Father     Cancer Sister     Breast cancer Sister 33    No Known Problems Maternal Aunt     Glaucoma Maternal Uncle     Diabetes Maternal Uncle     No Known Problems Paternal Aunt     No Known Problems Paternal Uncle     No Known Problems Maternal Grandmother     No Known Problems Maternal Grandfather     No Known Problems Paternal Grandmother     No Known Problems  Paternal Grandfather     No Known Problems Brother     Diabetes Other     Hypertension Other     Thyroid disease Other     Amblyopia Neg Hx     Blindness Neg Hx     Cataracts Neg Hx     Macular degeneration Neg Hx     Retinal detachment Neg Hx     Strabismus Neg Hx     Stroke Neg Hx        Social History     Socioeconomic History    Marital status:    Tobacco Use    Smoking status: Former     Types: Cigarettes    Smokeless tobacco: Never    Tobacco comments:     occasionally   Substance and Sexual Activity    Alcohol use: No    Drug use: No       Lab Results   Component Value Date    WBC 8.84 02/27/2023    HGB 12.1 02/27/2023    HCT 39.2 02/27/2023    MCV 89 02/27/2023     02/27/2023    CHOL 85 (L) 02/27/2023    TRIG 64 02/27/2023    HDL 31 (L) 02/27/2023    ALT 25 02/27/2023    AST 25 02/27/2023    BILITOT 0.4 02/27/2023    ALKPHOS 90 02/27/2023     02/27/2023    K 4.4 02/27/2023     02/27/2023    CREATININE 1.5 (H) 02/27/2023    ESTGFRAFRICA 53.3 (A) 05/30/2022    EGFRNONAA 46.2 (A) 05/30/2022    CALCIUM 10.2 02/27/2023    ALBUMIN 3.6 02/27/2023    BUN 23 02/27/2023    CO2 26 02/27/2023    TSH 4.032 (H) 02/27/2023    INR 1.1 01/25/2005    HGBA1C 5.4 02/27/2023    MICALBCREAT 82.5 (H) 05/30/2022    LDLCALC 41.2 (L) 02/27/2023    GLU 78 02/27/2023       Hemoglobin A1C (%)   Date Value   02/27/2023 5.4   10/17/2022 5.4   09/08/2022 5.3   05/30/2022 5.4   09/01/2021 6.1 (H)           eGFR if non African American (mL/min/1.73 m^2)   Date Value   05/30/2022 46.2 (A)   09/01/2021 35.3 (A)   05/26/2021 51.7 (A)   02/26/2020 47.4 (A)   11/16/2018 44.5 (A)   06/16/2017 48.4 (A)   03/07/2017 44.7 (A)   11/03/2016 48 (A)   06/02/2016 53.6 (A)   11/02/2015 >60     eGFR (mL/min/1.73 m^2)   Date Value   02/27/2023 37.3 (A)   11/21/2022 37.3 (A)   10/17/2022 32.1 (A)   09/08/2022 44.2 (A)     Creatinine (mg/dL)   Date Value   02/27/2023 1.5 (H)   11/21/2022 1.5 (H)   10/17/2022 1.7 (H)   09/08/2022 1.3  "  05/30/2022 1.2   09/01/2021 1.5 (H)   05/26/2021 1.1   02/26/2020 1.19   11/16/2018 1.26   06/16/2017 1.19                   Vital signs reviewed  Vitals:    03/06/23 1020   BP: (!) 118/54   BP Location: Left arm   Patient Position: Sitting   BP Method: Medium (Manual)   Pulse: 63   Temp: 98 °F (36.7 °C)   TempSrc: Oral   SpO2: 100%   Weight: 84.7 kg (186 lb 11.7 oz)   Height: 5' 1" (1.549 m)     Wt Readings from Last 6 Encounters:   03/06/23 84.7 kg (186 lb 11.7 oz)   11/28/22 84.3 kg (185 lb 13.6 oz)   08/25/22 84.4 kg (186 lb 1.1 oz)   05/26/22 83.7 kg (184 lb 8.4 oz)   03/24/22 96.8 kg (213 lb 6.5 oz)   08/17/21 96.8 kg (213 lb 6.5 oz)       PE:   APPEARANCE: Well nourished, well developed, in no acute distress.    HEAD: Normocephalic, atraumatic.  NECK: Supple with no cervical lymphadenopathy.  No carotid bruits.  No thyromegaly  CHEST: Good inspiratory effort. Lungs clear to auscultation with no wheezes or crackles.  CARDIOVASCULAR: Normal S1, S2. No rubs, murmurs, or gallops.  ABDOMEN: Bowel sounds normal. Not distended. Soft. No tenderness or masses. No organomegaly.  EXTREMITIES: No edema   DIABETIC FOOT EXAM:  Up-to-date August 25, 2022    IMPRESSION  1. Diabetes mellitus with stage 3 chronic kidney disease    2. Essential hypertension    3. Other osteoporosis without current pathological fracture    4. Postablative hypothyroidism    5. Severe obesity (BMI 35.0-35.9 with comorbidity)            PLAN          Diabetes health maintenance:  -- advise regular and consistent glucose monitoring and medication compliance.  -- advise daily foot checks  -- advise yearly ophthalmologic exams    -- advise adequate dietary and exercise modification  -- advise regular dental visits  -- advise daily low-dose aspirin use if patient is not on other anticoagulants and there are no other contraindications.  -- Medication management:  Continue metformin 500 b.i.d.    Dyslipidemia:  Continue Crestor    Hypothyroidism:  " Continue Synthroid 88 mcg daily.  Trend TSH    Hypertension:   amlodipine 5 mg daily.  Given consistently low diastolics, will prior to reduce losartan to 50 mg daily in case this is contributing to some lower renal perfusion issues and affecting her renal function.  She stays hydrated.  Stays off of any NSAIDs..  Maintain blood pressure less than 140/90.  Notify blood pressures elevating beyond this with the reduction in losartan.    Osteoporosis:  Discussed treatment options.encourage exercise, getting enough calcium and vitamin-D in diet.  Given renal function, will avoid bisphosphonates and start instead on Prolia ,will message infusion team.     Follow-up in 6 months with labs prior     Orders Placed This Encounter   Procedures    CBC Auto Differential    Comprehensive Metabolic Panel    Hemoglobin A1C    TSH    Microalbumin/Creatinine Ratio, Urine               HEALTH SCREENINGS  Immunization:  Tdap in 2015   Pneumovax : 9/24/12, 2018  Flu: utd  Pcv:  09/22/2017  CoVID vaccine booster is scheduled (bivalent)    Shingles vaccine up-to-date  Mammogram 03/24/22, reordered for March 2023     Pap:2016     Colonoscopy 2008. Multiple small hyperplastic polyps removed. Repeat in 7-10 years, rpt screening fit was negative on 6/2/22    DEXA scan:  02/26/2020.  Osteopenia:  L-spine T-score is-1.6.  Left Femoral neck T-score is-1.4.  Right femoral neck T-score -1.5,  DEXA scan 02/27/2023.  Lumbar T-score is-3.1 area left femoral neck T-score is-2.7.

## 2023-03-27 ENCOUNTER — HOSPITAL ENCOUNTER (OUTPATIENT)
Dept: RADIOLOGY | Facility: HOSPITAL | Age: 73
Discharge: HOME | End: 2023-03-27
Attending: FAMILY MEDICINE
Payer: COMMERCIAL

## 2023-03-27 DIAGNOSIS — Z12.31 BREAST CANCER SCREENING BY MAMMOGRAM: ICD-10-CM

## 2023-03-27 PROCEDURE — 77067 SCR MAMMO BI INCL CAD: CPT | Mod: TC,PO

## 2023-03-29 ENCOUNTER — INFUSION (OUTPATIENT)
Dept: INFUSION THERAPY | Facility: HOSPITAL | Age: 73
End: 2023-03-29
Attending: FAMILY MEDICINE
Payer: COMMERCIAL

## 2023-03-29 VITALS — HEIGHT: 61 IN | WEIGHT: 186.75 LBS | BODY MASS INDEX: 35.26 KG/M2

## 2023-03-29 DIAGNOSIS — M81.0 OSTEOPOROSIS: Primary | ICD-10-CM

## 2023-03-29 PROCEDURE — 63600175 PHARM REV CODE 636 W HCPCS: Mod: JZ,JG | Performed by: FAMILY MEDICINE

## 2023-03-29 PROCEDURE — 96372 THER/PROPH/DIAG INJ SC/IM: CPT

## 2023-03-29 RX ADMIN — DENOSUMAB 60 MG: 60 INJECTION SUBCUTANEOUS at 10:03

## 2023-03-29 NOTE — NURSING
Pt tolerated Prolia injection well. Education provided on taking calcium and vitamin D supplements, along with no dental work 3 months prior to and 3 months after injection. Pt verbalized understanding. Next appointment scheduled in 6 months. Instructed pt on having CMP lab work and MD visit prior to next injection.

## 2023-08-24 ENCOUNTER — PATIENT OUTREACH (OUTPATIENT)
Dept: ADMINISTRATIVE | Facility: HOSPITAL | Age: 73
End: 2023-08-24
Payer: COMMERCIAL

## 2023-08-24 NOTE — PROGRESS NOTES
Care Everywhere updates requested and reviewed.  Immunizations reconciled. Media reports reviewed.  Duplicate HM overrides and  orders removed.  Overdue HM topic chart audit and/or requested.  Overdue lab testing linked to upcoming lab appointments if applies.      Health Maintenance Due   Topic Date Due    COVID-19 Vaccine (6 - Pfizer series) 2023    Diabetes Urine Screening  2023    Colorectal Cancer Screening  2023    Foot Exam  2023    Hemoglobin A1c  2023

## 2023-08-28 NOTE — PROGRESS NOTES
Subjective:      Patient ID: Hunter Cisneros is a 72 y.o. female.    Chief Complaint:  No chief complaint on file.    History of Present Illness: Hunter Cisneros is a 72 y.o. woman with Type 2 DM, HTN, CKD stage 3, postablative hypothyroidism, vitamin d deficiency, dyslipidemia, osteopenia, GERD, and ocular hypertension presents for follow up of Type 2 DM. Previous patient of mine. Last visit with Dr Cornell in Aug 2022.    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   Denies missing doses   She is taking correctly.    Intentional weight loss about 40 pounds over past several months but unable to lose more weight.  Denies palpitations or tremors   + mental fog and constipation     Lab Results   Component Value Date    TSH 4.032 (H) 02/27/2023     With regards to the diabetes:    Diagnosed with Type 2 DM July 2011  Family History of Type 2 DM: maternal uncles  Known complications:  DKA/HHS: denies  RN: denies; up to date  PN: on gabapentin  Nephropathy: denies  Gastroparesis: denies  CAD: denies    Current regimen:  On metformin 500 mg twice daily    Missed doses? -none    Other medications tried:  invokana-too expensive    1-2 # times a day testing  Log reviewed: none Oral recall normal range     Hypoglycemic event- Denies  Knows how to correct with 15 grams of carbs- juice, coke, or a peppermint.     Dietary recall: eating more since last visit as she is unable to leave home due to heat   Eats 3 meals a day.   Snacks: little bit             Education - last visit: politely declines    john Management Status  Statin: Taking  ACE/ARB: Taking    Denies personal history of pancreatitis or FH of thyroid cancer.     Lab Results   Component Value Date    HGBA1C 5.4 02/27/2023    HGBA1C 5.4 10/17/2022    HGBA1C 5.3 09/08/2022     Screening or Prevention Patient's value Goal  Complete/Controlled?   HgA1C Testing and Control   Lab Results   Component Value Date    HGBA1C 5.4 02/27/2023      Annually/Less than 8% Yes   Lipid profile : 02/27/2023 Annually Yes   LDL control Lab Results   Component Value Date    LDLCALC 41.2 (L) 02/27/2023    Annually/Less than 100 mg/dl  Yes   Nephropathy screening Lab Results   Component Value Date    LABMICR 104.0 05/30/2022     Lab Results   Component Value Date    PROTEINUA Negative 04/25/2014    Annually No   Blood pressure BP Readings from Last 1 Encounters:   08/30/23 (!) 142/78    Less than 140/90 Yes   Dilated retinal exam : 02/01/2023 Annually No   Foot exam   : 08/30/2023 Annually No     With regards to obesity,     Weight loss as above  Change in diet    Body mass index is 37.24 kg/m².    With regards to osteoporosis and Vit D deficiency,     Taking Vit D 1000 iu daily  Taking caltrate 2 tabs daily; calcium in diet  Denies fractures    She was started on Prolia per PCP in March 2023.    She denies falls since her last visit.   Social alcohol   Rare tobacco    She is walking for exercise   She is using cane today.  No formal exercise.   Denies need for dental work-has dentures     2/27/2023 DXA  COMPARISON:  02/26/2020.     FINDINGS:  The L1 to L4 vertebral bone mineral density is equal to 0.808 g/cm squared with a T score of -3.1.   The left femoral neck bone mineral density is equal to 0.568 g/cm squared with a T score of -2.7.   The total hip bone mineral density is equal to 0.721 g/cm squared with a T score of -2.0.   There is a 13% risk of a major osteoporotic fracture and a 3.2% risk of hip fracture in the next 10 years (FRAX).   Impression:   Osteoporosis    Vit D, 25-Hydroxy   Date Value Ref Range Status   09/08/2022 64 30 - 96 ng/mL Final     Comment:     Vitamin D deficiency.........<10 ng/mL                              Vitamin D insufficiency......10-29 ng/mL       Vitamin D sufficiency........> or equal to 30 ng/mL  Vitamin D  toxicity............>100 ng/mL       With regards to hypertension,     She is taking losartan-HCTZ  BP at goal today    With regards to dyslipidemia,      She is on crestor 40 mg qhs    Results for LEXII WALDROP (MRN [id number]) as of 8/17/2021 08:58  Lab Results   Component Value Date    LDLCALC 41.2 (L) 02/27/2023     Review of Systems   Constitutional:  Negative for fatigue.   Eyes:  Negative for visual disturbance.   Endocrine: Negative for polydipsia, polyphagia and polyuria.     Objective:        Vitals:    08/30/23 0918   BP: (!) 142/78   Pulse: 67     Physical Exam  Vitals reviewed.   Constitutional:       Appearance: She is well-developed.   Neck:      Thyroid: No thyromegaly.   Pulmonary:      Effort: Pulmonary effort is normal.   Abdominal:      Palpations: Abdomen is soft.   Musculoskeletal:      Comments: Using cane     Protective Sensation (w/ 10 gram monofilament):8/25/22  Right: Intact  Left: Intact    Visual Inspection:  Normal -  Bilateral    Pedal Pulses:   Right: Present  Left: Present    Posterior tibialis:   Right:Present  Left: Present      Lab Review:   Lab Results   Component Value Date    HGBA1C 5.4 02/27/2023     Lab Results   Component Value Date    TSH 4.032 (H) 02/27/2023       Assessment:     1. Type 2 diabetes mellitus without complication, without long-term current use of insulin  TSH    Hemoglobin A1C    tirzepatide (MOUNJARO) 2.5 mg/0.5 mL PnIj    Comprehensive Metabolic Panel      2. Postablative hypothyroidism        3. Vitamin D deficiency        4. Osteoporosis, unspecified osteoporosis type, unspecified pathological fracture presence  CANCELED: PTH, Intact      5. Mixed hyperlipidemia        6. Essential hypertension        7. BMI 35.0-35.9,adult               Plan:     Type 2 diabetes mellitus without complication  Tolerating metformin well  Check A1c     Medication Changes   Continue Metformin 500 mg two tabs daily  Start Mounjaro 2.5 mg weekly for 4 weeks & then increase  to 5 mg weekly. We will re-evaluate your blood sugars in 4 weeks and determine need for increased dose. Return demonstration of how to use pen done in office today. Discussed one pen can stay out of the refrigerator for 21 days. Please take pen out of the refrigerator 10 minutes prior to injection.     Postablative hypothyroidism   Last labs were hypothyroid in Feb 2023   Continue Levothyroxine 88 mcg Monday-Saturday   Check Tsh today    Vitamin D deficiency  Update with next labs    Osteoporosis  RDA of vitamin D 1000 IU daily  RDA of calcium is 4118-2542 mg daily, preferably from food  Avoid falls     Continue Prolia   Started in March 2023    Mixed hyperlipidemia  LDL at goal    Essential hypertension  BP slightly above goal    BMI 35.0-35.9,adult  Excellent weight loss with recent diet changes      Follow up in about 6 months (around 2/29/2024).

## 2023-08-29 NOTE — ASSESSMENT & PLAN NOTE
Tolerating metformin well  Check A1c     Medication Changes   Continue Metformin 500 mg two tabs daily  Start Mounjaro 2.5 mg weekly for 4 weeks & then increase to 5 mg weekly. We will re-evaluate your blood sugars in 4 weeks and determine need for increased dose. Return demonstration of how to use pen done in office today. Discussed one pen can stay out of the refrigerator for 21 days. Please take pen out of the refrigerator 10 minutes prior to injection.

## 2023-08-29 NOTE — ASSESSMENT & PLAN NOTE
Last labs were hypothyroid in Feb 2023   Continue Levothyroxine 88 mcg Monday-Saturday   Check Tsh today

## 2023-08-29 NOTE — ASSESSMENT & PLAN NOTE
RDA of vitamin D 1000 IU daily  RDA of calcium is 5659-6082 mg daily, preferably from food  Avoid falls     Continue Prolia   Started in March 2023

## 2023-08-30 ENCOUNTER — OFFICE VISIT (OUTPATIENT)
Dept: ENDOCRINOLOGY | Facility: CLINIC | Age: 73
End: 2023-08-30
Payer: COMMERCIAL

## 2023-08-30 ENCOUNTER — LAB VISIT (OUTPATIENT)
Dept: LAB | Facility: HOSPITAL | Age: 73
End: 2023-08-30
Payer: COMMERCIAL

## 2023-08-30 ENCOUNTER — TELEPHONE (OUTPATIENT)
Dept: ENDOCRINOLOGY | Facility: CLINIC | Age: 73
End: 2023-08-30

## 2023-08-30 VITALS
DIASTOLIC BLOOD PRESSURE: 78 MMHG | SYSTOLIC BLOOD PRESSURE: 142 MMHG | OXYGEN SATURATION: 99 % | HEART RATE: 67 BPM | HEIGHT: 61 IN | BODY MASS INDEX: 37.2 KG/M2 | WEIGHT: 197.06 LBS

## 2023-08-30 DIAGNOSIS — E89.0 POSTABLATIVE HYPOTHYROIDISM: ICD-10-CM

## 2023-08-30 DIAGNOSIS — M81.0 OSTEOPOROSIS, UNSPECIFIED OSTEOPOROSIS TYPE, UNSPECIFIED PATHOLOGICAL FRACTURE PRESENCE: ICD-10-CM

## 2023-08-30 DIAGNOSIS — E78.2 MIXED HYPERLIPIDEMIA: ICD-10-CM

## 2023-08-30 DIAGNOSIS — E11.9 TYPE 2 DIABETES MELLITUS WITHOUT COMPLICATION, WITHOUT LONG-TERM CURRENT USE OF INSULIN: ICD-10-CM

## 2023-08-30 DIAGNOSIS — E55.9 VITAMIN D DEFICIENCY: ICD-10-CM

## 2023-08-30 DIAGNOSIS — I10 ESSENTIAL HYPERTENSION: ICD-10-CM

## 2023-08-30 LAB
ALBUMIN SERPL BCP-MCNC: 3.7 G/DL (ref 3.5–5.2)
ALP SERPL-CCNC: 73 U/L (ref 55–135)
ALT SERPL W/O P-5'-P-CCNC: 31 U/L (ref 10–44)
ANION GAP SERPL CALC-SCNC: 8 MMOL/L (ref 8–16)
AST SERPL-CCNC: 32 U/L (ref 10–40)
BILIRUB SERPL-MCNC: 0.4 MG/DL (ref 0.1–1)
BUN SERPL-MCNC: 23 MG/DL (ref 8–23)
CALCIUM SERPL-MCNC: 10.4 MG/DL (ref 8.7–10.5)
CHLORIDE SERPL-SCNC: 106 MMOL/L (ref 95–110)
CO2 SERPL-SCNC: 24 MMOL/L (ref 23–29)
CREAT SERPL-MCNC: 1.3 MG/DL (ref 0.5–1.4)
EST. GFR  (NO RACE VARIABLE): 44.2 ML/MIN/1.73 M^2
ESTIMATED AVG GLUCOSE: 108 MG/DL (ref 68–131)
GLUCOSE SERPL-MCNC: 96 MG/DL (ref 70–110)
HBA1C MFR BLD: 5.4 % (ref 4–5.6)
POTASSIUM SERPL-SCNC: 4.4 MMOL/L (ref 3.5–5.1)
PROT SERPL-MCNC: 7.4 G/DL (ref 6–8.4)
SODIUM SERPL-SCNC: 138 MMOL/L (ref 136–145)
T4 FREE SERPL-MCNC: 0.89 NG/DL (ref 0.71–1.51)
TSH SERPL DL<=0.005 MIU/L-ACNC: 4.83 UIU/ML (ref 0.4–4)

## 2023-08-30 PROCEDURE — 84439 ASSAY OF FREE THYROXINE: CPT | Performed by: NURSE PRACTITIONER

## 2023-08-30 PROCEDURE — 99214 OFFICE O/P EST MOD 30 MIN: CPT | Mod: S$GLB,,, | Performed by: NURSE PRACTITIONER

## 2023-08-30 PROCEDURE — 99999 PR PBB SHADOW E&M-EST. PATIENT-LVL IV: ICD-10-PCS | Mod: PBBFAC,,, | Performed by: NURSE PRACTITIONER

## 2023-08-30 PROCEDURE — 3288F FALL RISK ASSESSMENT DOCD: CPT | Mod: CPTII,S$GLB,, | Performed by: NURSE PRACTITIONER

## 2023-08-30 PROCEDURE — 3008F PR BODY MASS INDEX (BMI) DOCUMENTED: ICD-10-PCS | Mod: CPTII,S$GLB,, | Performed by: NURSE PRACTITIONER

## 2023-08-30 PROCEDURE — 4010F ACE/ARB THERAPY RXD/TAKEN: CPT | Mod: CPTII,S$GLB,, | Performed by: NURSE PRACTITIONER

## 2023-08-30 PROCEDURE — 1125F PR PAIN SEVERITY QUANTIFIED, PAIN PRESENT: ICD-10-PCS | Mod: CPTII,S$GLB,, | Performed by: NURSE PRACTITIONER

## 2023-08-30 PROCEDURE — 1159F MED LIST DOCD IN RCRD: CPT | Mod: CPTII,S$GLB,, | Performed by: NURSE PRACTITIONER

## 2023-08-30 PROCEDURE — 1101F PR PT FALLS ASSESS DOC 0-1 FALLS W/OUT INJ PAST YR: ICD-10-PCS | Mod: CPTII,S$GLB,, | Performed by: NURSE PRACTITIONER

## 2023-08-30 PROCEDURE — 3288F PR FALLS RISK ASSESSMENT DOCUMENTED: ICD-10-PCS | Mod: CPTII,S$GLB,, | Performed by: NURSE PRACTITIONER

## 2023-08-30 PROCEDURE — 80053 COMPREHEN METABOLIC PANEL: CPT | Performed by: NURSE PRACTITIONER

## 2023-08-30 PROCEDURE — 3078F PR MOST RECENT DIASTOLIC BLOOD PRESSURE < 80 MM HG: ICD-10-PCS | Mod: CPTII,S$GLB,, | Performed by: NURSE PRACTITIONER

## 2023-08-30 PROCEDURE — 99999 PR PBB SHADOW E&M-EST. PATIENT-LVL IV: CPT | Mod: PBBFAC,,, | Performed by: NURSE PRACTITIONER

## 2023-08-30 PROCEDURE — 1101F PT FALLS ASSESS-DOCD LE1/YR: CPT | Mod: CPTII,S$GLB,, | Performed by: NURSE PRACTITIONER

## 2023-08-30 PROCEDURE — 1125F AMNT PAIN NOTED PAIN PRSNT: CPT | Mod: CPTII,S$GLB,, | Performed by: NURSE PRACTITIONER

## 2023-08-30 PROCEDURE — 3008F BODY MASS INDEX DOCD: CPT | Mod: CPTII,S$GLB,, | Performed by: NURSE PRACTITIONER

## 2023-08-30 PROCEDURE — 3077F SYST BP >= 140 MM HG: CPT | Mod: CPTII,S$GLB,, | Performed by: NURSE PRACTITIONER

## 2023-08-30 PROCEDURE — 99214 PR OFFICE/OUTPT VISIT, EST, LEVL IV, 30-39 MIN: ICD-10-PCS | Mod: S$GLB,,, | Performed by: NURSE PRACTITIONER

## 2023-08-30 PROCEDURE — 1159F PR MEDICATION LIST DOCUMENTED IN MEDICAL RECORD: ICD-10-PCS | Mod: CPTII,S$GLB,, | Performed by: NURSE PRACTITIONER

## 2023-08-30 PROCEDURE — 1160F PR REVIEW ALL MEDS BY PRESCRIBER/CLIN PHARMACIST DOCUMENTED: ICD-10-PCS | Mod: CPTII,S$GLB,, | Performed by: NURSE PRACTITIONER

## 2023-08-30 PROCEDURE — 83036 HEMOGLOBIN GLYCOSYLATED A1C: CPT | Performed by: NURSE PRACTITIONER

## 2023-08-30 PROCEDURE — 84443 ASSAY THYROID STIM HORMONE: CPT | Performed by: NURSE PRACTITIONER

## 2023-08-30 PROCEDURE — 36415 COLL VENOUS BLD VENIPUNCTURE: CPT | Performed by: NURSE PRACTITIONER

## 2023-08-30 PROCEDURE — 1160F RVW MEDS BY RX/DR IN RCRD: CPT | Mod: CPTII,S$GLB,, | Performed by: NURSE PRACTITIONER

## 2023-08-30 PROCEDURE — 3044F PR MOST RECENT HEMOGLOBIN A1C LEVEL <7.0%: ICD-10-PCS | Mod: CPTII,S$GLB,, | Performed by: NURSE PRACTITIONER

## 2023-08-30 PROCEDURE — 3077F PR MOST RECENT SYSTOLIC BLOOD PRESSURE >= 140 MM HG: ICD-10-PCS | Mod: CPTII,S$GLB,, | Performed by: NURSE PRACTITIONER

## 2023-08-30 PROCEDURE — 3044F HG A1C LEVEL LT 7.0%: CPT | Mod: CPTII,S$GLB,, | Performed by: NURSE PRACTITIONER

## 2023-08-30 PROCEDURE — 3078F DIAST BP <80 MM HG: CPT | Mod: CPTII,S$GLB,, | Performed by: NURSE PRACTITIONER

## 2023-08-30 PROCEDURE — 4010F PR ACE/ARB THEARPY RXD/TAKEN: ICD-10-PCS | Mod: CPTII,S$GLB,, | Performed by: NURSE PRACTITIONER

## 2023-08-30 RX ORDER — TIRZEPATIDE 2.5 MG/.5ML
2.5 INJECTION, SOLUTION SUBCUTANEOUS
Qty: 4 PEN | Refills: 3 | Status: SHIPPED | OUTPATIENT
Start: 2023-08-30 | End: 2023-09-11 | Stop reason: SDUPTHER

## 2023-08-30 NOTE — PATIENT INSTRUCTIONS
Thyroid Hormone    Last labs were hypothyroid in Feb 2023   Continue Levothyroxine 88 mcg Monday-Saturday   Check Tsh today    Diabetes    Check A1c     Medication Changes   Continue Metformin 500 mg two tabs daily  Start Mounjaro 2.5 mg weekly for 4 weeks & then increase to 5 mg weekly. We will re-evaluate your blood sugars in 4 weeks and determine need for increased dose. Return demonstration of how to use pen done in office today. Discussed one pen can stay out of the refrigerator for 21 days. Please take pen out of the refrigerator 10 minutes prior to injection.   Call [phone] for access to savings plan.    Osteoporosis   RDA of vitamin D 1000 IU daily  RDA of calcium is 8794-0508 mg daily, preferably from food  Avoid falls     Continue Prolia     Side effects of Prolia reviewed, including risk of hypocalcemia, eczema/skin complications and possible increased risk of infections.  Also reviewed association with ONJ and rare atypical femur fractures.  Advise to see the dentist regularly, notify dentist of using this medication and avoid non-emergent dental implants and extractions.  Also advise to contact us if develops new, persistent thigh or groin pain.    Discussed ongoing concern and accumulating data describing rebound-associated vertebral fractures (RAVFs) after denosumab discontinuation.  We now know that discontinuation of denosumab is associated with up to 50% reduction in BMD that is only partially blocked by Reclast.  Current recommendations are to either continue indefinitely or to switch to oral bisphosphonate for at least a year.      Estimated Calcium Content of Foods:  Produce  Serving Size Estimated Calcium*    [place] greens, frozen 8 oz 360 mg   Broccoli oskar 8 oz 200 mg   Kale, frozen 8 oz 180 mg   Soy Beans, green, boiled 8 oz 175 mg   Bok Janis, cooked, boiled 8 oz 160 mg   Figs, dried 2 figs 65 mg   Broccoli, fresh, cooked 8 oz 60 mg   Oranges 1 whole 55 mg   Seafood Serving Size  Estimated Calcium*    Sardines, canned with bones 3 oz 325 mg   [place], canned with bones 3 oz 180 mg   Shrimp, canned 3 oz 125 mg   Dairy Serving Size Estimated Calcium*    Ricotta, part-skim 4 oz 335 mg   Yogurt, plain, low-fat 6 oz 310 mg   Milk, skim, low-fat, whole 8 oz 300 mg   Yogurt with fruit, low-fat 6 oz 260 mg   Mozzarella, part-skim 1 oz 210 mg   Cheddar 1 oz 205 mg   Yogurt, Greek 6 oz 200 mg   American Cheese 1 oz 195 mg   Feta Cheese 4 oz 140 mg   Cottage Cheese, 2% 4 oz 105 mg   Frozen yogurt, vanilla 8 oz 105 mg   Ice Cream, vanilla 8 oz 85 mg   Parmesan 1 tbsp 55 mg   Fortified Food Serving Size Estimated Calcium*   [place] milk, rice milk or soy milk, fortified 8 oz 300 mg   Orange juice and other fruit juices, fortified 8 oz 300 mg   Tofu, prepared with calcium 4 oz 205 mg   Waffle, frozen, fortified 2 pieces 200 mg   Oatmeal, fortified 1 packet 140 mg   English muffin, fortified 1 muffin 100 mg   Cereal, fortified 8 oz 100-1,000 mg   Other Serving Size Estimated Calcium*   Mac & cheese, frozen 1 package 325 mg   Pizza, cheese, frozen 1 serving 115 mg   Pudding, chocolate, prepared with 2% milk 4 oz 160 mg   Beans, baked, canned 4 oz 160 mg   *The calcium content listed for most foods is estimated and can vary due to multiple factors. Check the food label to determine how much calcium is in a particular product.  If you read the nutrition label for a food source, it lists the % calcium in that food.  For an 8 oz glass of milk, for example, the label states calcium 30%.  This is equivalent to 300 mg of calcium (multiply the listed number by 10).   **Table from the National [employer]opor[employer] Foundation

## 2023-09-07 ENCOUNTER — OFFICE VISIT (OUTPATIENT)
Dept: FAMILY MEDICINE | Facility: CLINIC | Age: 73
End: 2023-09-07
Payer: COMMERCIAL

## 2023-09-07 ENCOUNTER — LAB VISIT (OUTPATIENT)
Dept: LAB | Facility: HOSPITAL | Age: 73
End: 2023-09-07
Attending: FAMILY MEDICINE
Payer: COMMERCIAL

## 2023-09-07 VITALS
OXYGEN SATURATION: 98 % | HEART RATE: 62 BPM | SYSTOLIC BLOOD PRESSURE: 138 MMHG | WEIGHT: 198.63 LBS | BODY MASS INDEX: 37.5 KG/M2 | DIASTOLIC BLOOD PRESSURE: 58 MMHG | HEIGHT: 61 IN

## 2023-09-07 DIAGNOSIS — E11.22 DIABETES MELLITUS WITH STAGE 3 CHRONIC KIDNEY DISEASE: ICD-10-CM

## 2023-09-07 DIAGNOSIS — M81.8 OTHER OSTEOPOROSIS WITHOUT CURRENT PATHOLOGICAL FRACTURE: ICD-10-CM

## 2023-09-07 DIAGNOSIS — Z12.11 COLON CANCER SCREENING: ICD-10-CM

## 2023-09-07 DIAGNOSIS — E89.0 POSTABLATIVE HYPOTHYROIDISM: ICD-10-CM

## 2023-09-07 DIAGNOSIS — I10 ESSENTIAL HYPERTENSION: ICD-10-CM

## 2023-09-07 DIAGNOSIS — E11.22 DIABETES MELLITUS WITH STAGE 3 CHRONIC KIDNEY DISEASE: Primary | ICD-10-CM

## 2023-09-07 DIAGNOSIS — M70.62 TROCHANTERIC BURSITIS OF LEFT HIP: ICD-10-CM

## 2023-09-07 DIAGNOSIS — E66.01 SEVERE OBESITY (BMI 35.0-35.9 WITH COMORBIDITY): ICD-10-CM

## 2023-09-07 DIAGNOSIS — N18.30 DIABETES MELLITUS WITH STAGE 3 CHRONIC KIDNEY DISEASE: Primary | ICD-10-CM

## 2023-09-07 DIAGNOSIS — N18.30 DIABETES MELLITUS WITH STAGE 3 CHRONIC KIDNEY DISEASE: ICD-10-CM

## 2023-09-07 PROCEDURE — 3075F SYST BP GE 130 - 139MM HG: CPT | Mod: CPTII,S$GLB,, | Performed by: FAMILY MEDICINE

## 2023-09-07 PROCEDURE — 3078F DIAST BP <80 MM HG: CPT | Mod: CPTII,S$GLB,, | Performed by: FAMILY MEDICINE

## 2023-09-07 PROCEDURE — 3075F PR MOST RECENT SYSTOLIC BLOOD PRESS GE 130-139MM HG: ICD-10-PCS | Mod: CPTII,S$GLB,, | Performed by: FAMILY MEDICINE

## 2023-09-07 PROCEDURE — 3044F HG A1C LEVEL LT 7.0%: CPT | Mod: CPTII,S$GLB,, | Performed by: FAMILY MEDICINE

## 2023-09-07 PROCEDURE — 99214 PR OFFICE/OUTPT VISIT, EST, LEVL IV, 30-39 MIN: ICD-10-PCS | Mod: S$GLB,,, | Performed by: FAMILY MEDICINE

## 2023-09-07 PROCEDURE — 3008F PR BODY MASS INDEX (BMI) DOCUMENTED: ICD-10-PCS | Mod: CPTII,S$GLB,, | Performed by: FAMILY MEDICINE

## 2023-09-07 PROCEDURE — 1101F PT FALLS ASSESS-DOCD LE1/YR: CPT | Mod: CPTII,S$GLB,, | Performed by: FAMILY MEDICINE

## 2023-09-07 PROCEDURE — 99214 OFFICE O/P EST MOD 30 MIN: CPT | Mod: S$GLB,,, | Performed by: FAMILY MEDICINE

## 2023-09-07 PROCEDURE — 1101F PR PT FALLS ASSESS DOC 0-1 FALLS W/OUT INJ PAST YR: ICD-10-PCS | Mod: CPTII,S$GLB,, | Performed by: FAMILY MEDICINE

## 2023-09-07 PROCEDURE — 1159F MED LIST DOCD IN RCRD: CPT | Mod: CPTII,S$GLB,, | Performed by: FAMILY MEDICINE

## 2023-09-07 PROCEDURE — 4010F PR ACE/ARB THEARPY RXD/TAKEN: ICD-10-PCS | Mod: CPTII,S$GLB,, | Performed by: FAMILY MEDICINE

## 2023-09-07 PROCEDURE — 3288F FALL RISK ASSESSMENT DOCD: CPT | Mod: CPTII,S$GLB,, | Performed by: FAMILY MEDICINE

## 2023-09-07 PROCEDURE — 82274 ASSAY TEST FOR BLOOD FECAL: CPT | Performed by: FAMILY MEDICINE

## 2023-09-07 PROCEDURE — 3078F PR MOST RECENT DIASTOLIC BLOOD PRESSURE < 80 MM HG: ICD-10-PCS | Mod: CPTII,S$GLB,, | Performed by: FAMILY MEDICINE

## 2023-09-07 PROCEDURE — 1125F PR PAIN SEVERITY QUANTIFIED, PAIN PRESENT: ICD-10-PCS | Mod: CPTII,S$GLB,, | Performed by: FAMILY MEDICINE

## 2023-09-07 PROCEDURE — 1125F AMNT PAIN NOTED PAIN PRSNT: CPT | Mod: CPTII,S$GLB,, | Performed by: FAMILY MEDICINE

## 2023-09-07 PROCEDURE — 99999 PR PBB SHADOW E&M-EST. PATIENT-LVL V: ICD-10-PCS | Mod: PBBFAC,,, | Performed by: FAMILY MEDICINE

## 2023-09-07 PROCEDURE — 4010F ACE/ARB THERAPY RXD/TAKEN: CPT | Mod: CPTII,S$GLB,, | Performed by: FAMILY MEDICINE

## 2023-09-07 PROCEDURE — 3044F PR MOST RECENT HEMOGLOBIN A1C LEVEL <7.0%: ICD-10-PCS | Mod: CPTII,S$GLB,, | Performed by: FAMILY MEDICINE

## 2023-09-07 PROCEDURE — 99999 PR PBB SHADOW E&M-EST. PATIENT-LVL V: CPT | Mod: PBBFAC,,, | Performed by: FAMILY MEDICINE

## 2023-09-07 PROCEDURE — 3288F PR FALLS RISK ASSESSMENT DOCUMENTED: ICD-10-PCS | Mod: CPTII,S$GLB,, | Performed by: FAMILY MEDICINE

## 2023-09-07 PROCEDURE — 3008F BODY MASS INDEX DOCD: CPT | Mod: CPTII,S$GLB,, | Performed by: FAMILY MEDICINE

## 2023-09-07 PROCEDURE — 1159F PR MEDICATION LIST DOCUMENTED IN MEDICAL RECORD: ICD-10-PCS | Mod: CPTII,S$GLB,, | Performed by: FAMILY MEDICINE

## 2023-09-07 NOTE — PATIENT INSTRUCTIONS
"Over the counter pain therapies (creams/patch):    1. Lidocaine patch    2. aspercreme    3. "2 old goats"    4. "blue emu"    5. salonpas     7. Biofreeze        Trochanteric Bursitis Rehabilitation Exercises  You can begin stretching the muscles that run along the outside of your hip using the first 3 exercises. You can do the strengthening exercises when the sharp pain lessens.   Stretching exercises   Gluteal stretch: Lying on your back with both knees bent, rest the ankle of one leg over the knee of your other leg. Grasp the thigh of the bottom leg and pull that knee toward your chest. You will feel a stretch along the buttocks and possibly along the outside of your hip on the top leg. Hold this for 15 to 30 seconds. Repeat 3 times.   Iliotibial band stretch: Standing: Cross one leg in front of the other leg and bend down and touch your toes. You can move your hands across the floor toward the front leg and you will feel more stretch on the outside of your thigh on the other side. Hold this position for 15 to 30 seconds. Return to the starting position. Repeat 3 times. Reverse the positions of your legs and repeat.   Iliotibial band stretch: Side-leaning: Stand sideways near a wall. Place one hand on the wall for support. Cross the leg farthest from the wall over the other leg, keeping the foot closest to the wall flat on the floor. Lean your hips into the wall. Hold the stretch for 15 seconds, repeat 3 times, and then switch legs and repeat the exercise another 3 times.   Strengthening exercises   Straight leg raise: Lie on your back with your legs straight out in front of you. Bend the knee on your uninjured side and place the foot flat on the floor. Tighten the thigh muscle of the other leg and lift it about 8 inches off the floor, keeping the thigh muscle tight throughout. Slowly lower your leg back down to the floor. Do 3 sets of 10.   Prone hip extension: Lie on your stomach with your legs straight out " behind you. Tighten the buttocks and thigh muscles of your injured leg and lift it off the floor about 8 inches. Keep your knee straight. Hold for 5 seconds. Then lower your leg and relax. Do 3 sets of 10.   Side-lying leg lift: Lying on your uninjured side, tighten the front thigh muscles on your top leg and lift that leg 8 to 10 inches away from the other leg. Keep the leg straight and lower slowly. Do 3 sets of 10.   Wall squat with a ball: Stand with your back, shoulders, and head against a wall and look straight ahead. Keep your shoulders relaxed and your feet 2 feet away from the wall and a shoulder's width apart. Place a soccer or basketball-sized ball behind your back. Keeping your back upright, slowly squat down to a 45-degree angle. Your thighs will not yet be parallel to the floor. Hold this position for 10 seconds and then slowly slide back up the wall. Repeat 10 times. Build up to 3 sets of 10.   Written by Shahida Barron MS, PT, and Evelyn Haynes PT, [facility], [facility], for [employer].   Published by [employer].  © 2009 [employer] and/or its affiliates. All Rights Reserved.

## 2023-09-07 NOTE — PROGRESS NOTES
(Portions of this note were dictated using voice recognition software and may contain dictation related errors in spelling/grammar/syntax not found on text review)    CC:   Chief Complaint   Patient presents with    Follow-up     6 month         HPI: 73 y.o. female    Hypertension on losartan 50 mg daily  amlodipine 5 mg daily  .  BP stable .Had lost significant amount of weight intentionally through healthy diet.  Has put some weight back on but feels like it could be related to her thyroid, see below.  Was on hydrochlorothiazide but given decreasing renal function as below, she was advised to stay off this.  Blood pressure stable even off thiazide.  Does not take any NSAIDs.       Hyperlipidemia, Crestor 40 mg daily.      Diabetes with CKD 3, followed by endocrinology.  controlled on metformin 500 b.i.d. , recently started on Mounjaro 2.5 mg weekly     Hypothyroidism on Synthroid, currently on 88 mcg daily.  Was on 6 days a week but because of elevated TSH was advised to increase it to 1 pill daily.       Osteoporosis, started on Prolia.     Exercise, was doing some walking before but has not recently because of heat    Pain lateral left hip    Past Medical History:   Diagnosis Date    Cataract     CKD (chronic kidney disease) stage 3, GFR 30-59 ml/min 2014    Diabetes mellitus type II     GERD (gastroesophageal reflux disease)     Glaucoma (increased eye pressure)     HLD (hyperlipidemia)     HTN (hypertension)     Hypothyroidism     Morbid obesity 2014    Osteoporosis     Vitamin D deficiency 2014       Past Surgical History:   Procedure Laterality Date     SECTION, LOW TRANSVERSE         Family History   Problem Relation Age of Onset    Coronary artery disease Mother         premature, 57    No Known Problems Father     Cancer Sister     Breast cancer Sister 33    No Known Problems Maternal Aunt     Glaucoma Maternal Uncle     Diabetes Maternal Uncle     No Known Problems Paternal Aunt      No Known Problems Paternal Uncle     No Known Problems Maternal Grandmother     No Known Problems Maternal Grandfather     No Known Problems Paternal Grandmother     No Known Problems Paternal Grandfather     No Known Problems Brother     Diabetes Other     Hypertension Other     Thyroid disease Other     Amblyopia Neg Hx     Blindness Neg Hx     Cataracts Neg Hx     Macular degeneration Neg Hx     Retinal detachment Neg Hx     Strabismus Neg Hx     Stroke Neg Hx        Social History     Socioeconomic History    Marital status:    Tobacco Use    Smoking status: Former     Types: Cigarettes    Smokeless tobacco: Never    Tobacco comments:     occasionally   Substance and Sexual Activity    Alcohol use: No    Drug use: No       Lab Results   Component Value Date    WBC 8.84 02/27/2023    HGB 12.1 02/27/2023    HCT 39.2 02/27/2023    MCV 89 02/27/2023     02/27/2023    CHOL 85 (L) 02/27/2023    TRIG 64 02/27/2023    HDL 31 (L) 02/27/2023    ALT 31 08/30/2023    AST 32 08/30/2023    BILITOT 0.4 08/30/2023    ALKPHOS 73 08/30/2023     08/30/2023    K 4.4 08/30/2023     08/30/2023    CREATININE 1.3 08/30/2023    ESTGFRAFRICA 53.3 (A) 05/30/2022    EGFRNONAA 46.2 (A) 05/30/2022    EGFRNORACEVR 44.2 (A) 08/30/2023    CALCIUM 10.4 08/30/2023    ALBUMIN 3.7 08/30/2023    BUN 23 08/30/2023    CO2 24 08/30/2023    TSH 4.825 (H) 08/30/2023    INR 1.1 01/25/2005    HGBA1C 5.4 08/30/2023    MICALBCREAT 82.5 (H) 05/30/2022    LDLCALC 41.2 (L) 02/27/2023    GLU 96 08/30/2023    [id number] 64 09/08/2022             Hemoglobin A1C (%)   Date Value   08/30/2023 5.4   02/27/2023 5.4   10/17/2022 5.4   09/08/2022 5.3   05/30/2022 5.4           eGFR if non African American (mL/min/1.73 m^2)   Date Value   05/30/2022 46.2 (A)   09/01/2021 35.3 (A)   05/26/2021 51.7 (A)   02/26/2020 47.4 (A)   11/16/2018 44.5 (A)   06/16/2017 48.4 (A)   03/07/2017 44.7 (A)   11/03/2016 48 (A)   06/02/2016 53.6 (A)  "  11/02/2015 >60     eGFR (mL/min/1.73 m^2)   Date Value   08/30/2023 44.2 (A)   02/27/2023 37.3 (A)   11/21/2022 37.3 (A)   10/17/2022 32.1 (A)   09/08/2022 44.2 (A)     Creatinine (mg/dL)   Date Value   08/30/2023 1.3   02/27/2023 1.5 (H)   11/21/2022 1.5 (H)   10/17/2022 1.7 (H)   09/08/2022 1.3   05/30/2022 1.2   09/01/2021 1.5 (H)   05/26/2021 1.1   02/26/2020 1.19   11/16/2018 1.26                   Vital signs reviewed  Vitals:    09/07/23 0959   BP: (!) 138/58   BP Location: Right arm   Patient Position: Sitting   BP Method: Medium (Manual)   Pulse: 62   SpO2: 98%   Weight: 90.1 kg (198 lb 10.2 oz)   Height: 5' 1" (1.549 m)     Wt Readings from Last 6 Encounters:   09/07/23 90.1 kg (198 lb 10.2 oz)   08/30/23 89.4 kg (197 lb 1.5 oz)   03/29/23 84.7 kg (186 lb 11.7 oz)   03/06/23 84.7 kg (186 lb 11.7 oz)   11/28/22 84.3 kg (185 lb 13.6 oz)   08/25/22 84.4 kg (186 lb 1.1 oz)       PE:   APPEARANCE: Well nourished, well developed, in no acute distress.    HEAD: Normocephalic, atraumatic.  NECK: Supple with no cervical lymphadenopathy.  No carotid bruits.  No thyromegaly  CHEST: Good inspiratory effort. Lungs clear to auscultation with no wheezes or crackles.  CARDIOVASCULAR: Normal S1, S2. No rubs, murmurs, or gallops.  ABDOMEN: Bowel sounds normal. Not distended. Soft. No tenderness or masses. No organomegaly.  EXTREMITIES: No edema .  Pain over greater trochanter on left side  DIABETIC FOOT EXAM utd through endo 8/30/23            IMPRESSION  1. Diabetes mellitus with stage 3 chronic kidney disease    2. Other osteoporosis without current pathological fracture    3. Essential hypertension    4. Postablative hypothyroidism    5. Colon cancer screening    6. Severe obesity (BMI 35.0-35.9 with comorbidity)    7. Trochanteric bursitis of left hip              PLAN          Diabetes health maintenance:  -- advise regular and consistent glucose monitoring and medication compliance.  -- advise daily foot checks  -- " advise yearly ophthalmologic exams    -- advise adequate dietary and exercise modification  -- advise regular dental visits  -- advise daily low-dose aspirin use if patient is not on other anticoagulants and there are no other contraindications.  -- Medication management:  Continue metformin 500 b.i.d., Mounjaro was recently ordered, has not picked up yet, waiting on insurance coverage.    Dyslipidemia:  Continue Crestor    Hypothyroidism:  Continue Synthroid 88 mcg daily.  Trend TSH    Hypertension:   amlodipine 5 mg daily.   losartan   50 mg daily    Osteoporosis:  Prolia q.6 months.    Advised on IT band stretching exercises, demonstrated in printed out for her.  Tylenol is okay for pain, can do topical analgesic OTC as well.     Follow-up in 6 months      Orders Placed This Encounter   Procedures    Fecal Immunochemical Test (iFOBT)               HEALTH SCREENINGS  Immunization:  Tdap in 2015   Pneumovax : 9/24/12, 2018  Flu: utd  Pcv:  09/22/2017  CoVID vaccine booster   (bivalent) up-to-date  Zoster up-to-date     Shingles vaccine up-to-date  Mammogram March 2023, normal     Pap:2016     Colonoscopy 2008. Multiple small hyperplastic polyps removed. Repeat in 7-10 years, rpt screening fit was negative on 6/2/22, reordered fit kit    DEXA scan:  02/26/2020.  Osteopenia:  L-spine T-score is-1.6.  Left Femoral neck T-score is-1.4.  Right femoral neck T-score -1.5,  DEXA scan 02/27/2023.  Lumbar T-score is-3.1 area left femoral neck T-score is-2.7.

## 2023-09-11 DIAGNOSIS — E11.9 TYPE 2 DIABETES MELLITUS WITHOUT COMPLICATION, WITHOUT LONG-TERM CURRENT USE OF INSULIN: ICD-10-CM

## 2023-09-11 RX ORDER — TIRZEPATIDE 2.5 MG/.5ML
2.5 INJECTION, SOLUTION SUBCUTANEOUS
Qty: 4 PEN | Refills: 3 | Status: SHIPPED | OUTPATIENT
Start: 2023-09-11 | End: 2023-09-22

## 2023-09-12 LAB — HEMOCCULT STL QL IA: NEGATIVE

## 2023-09-21 ENCOUNTER — TELEPHONE (OUTPATIENT)
Dept: ENDOCRINOLOGY | Facility: CLINIC | Age: 73
End: 2023-09-21
Payer: COMMERCIAL

## 2023-09-21 DIAGNOSIS — E78.2 MIXED HYPERLIPIDEMIA: ICD-10-CM

## 2023-09-21 RX ORDER — ROSUVASTATIN CALCIUM 40 MG/1
40 TABLET, COATED ORAL NIGHTLY
Qty: 90 TABLET | Refills: 3 | Status: SHIPPED | OUTPATIENT
Start: 2023-09-21

## 2023-09-21 NOTE — TELEPHONE ENCOUNTER
Call patient to inform her of the information about her insurance.    Please call patient and let her know her insurance does not cover Mounjaro but they will cover one of the other medications in the class - Ozempic or Trulicity. Is she interested in trying one of those

## 2023-09-22 DIAGNOSIS — E11.9 TYPE 2 DIABETES MELLITUS WITHOUT COMPLICATION, WITHOUT LONG-TERM CURRENT USE OF INSULIN: Primary | ICD-10-CM

## 2023-09-26 ENCOUNTER — TELEPHONE (OUTPATIENT)
Dept: ENDOCRINOLOGY | Facility: CLINIC | Age: 73
End: 2023-09-26
Payer: COMMERCIAL

## 2023-10-02 ENCOUNTER — INFUSION (OUTPATIENT)
Dept: INFUSION THERAPY | Facility: HOSPITAL | Age: 73
End: 2023-10-02
Attending: FAMILY MEDICINE
Payer: COMMERCIAL

## 2023-10-02 VITALS
BODY MASS INDEX: 37.5 KG/M2 | DIASTOLIC BLOOD PRESSURE: 64 MMHG | RESPIRATION RATE: 18 BRPM | HEART RATE: 68 BPM | HEIGHT: 61 IN | OXYGEN SATURATION: 99 % | TEMPERATURE: 98 F | SYSTOLIC BLOOD PRESSURE: 155 MMHG | WEIGHT: 198.63 LBS

## 2023-10-02 DIAGNOSIS — M81.0 OSTEOPOROSIS: Primary | ICD-10-CM

## 2023-10-02 PROCEDURE — 96372 THER/PROPH/DIAG INJ SC/IM: CPT

## 2023-10-02 PROCEDURE — 63600175 PHARM REV CODE 636 W HCPCS: Mod: JZ,JG | Performed by: FAMILY MEDICINE

## 2023-10-02 RX ADMIN — DENOSUMAB 60 MG: 60 INJECTION SUBCUTANEOUS at 10:10

## 2023-10-02 NOTE — NURSING
Pt tolerated Prolia injection well. Education provided on taking calcium and vitamin D supplements, along with no dental work 3 months prior to and 3 months after injection. Pt verbalized understanding. Pt will call to schedule next appointment in 6 months.

## 2023-10-04 NOTE — TELEPHONE ENCOUNTER
Lab Results   Component Value Date    TSH 0.083 (L) 07/14/2022    V8XTRHD 116 10/06/2004    FREET4 1.22 07/14/2022        Yes

## 2023-10-11 ENCOUNTER — LAB VISIT (OUTPATIENT)
Dept: LAB | Facility: HOSPITAL | Age: 73
End: 2023-10-11
Attending: NURSE PRACTITIONER
Payer: COMMERCIAL

## 2023-10-11 DIAGNOSIS — E89.0 POSTABLATIVE HYPOTHYROIDISM: ICD-10-CM

## 2023-10-11 LAB — TSH SERPL DL<=0.005 MIU/L-ACNC: 0.99 UIU/ML (ref 0.4–4)

## 2023-10-11 PROCEDURE — 36415 COLL VENOUS BLD VENIPUNCTURE: CPT | Mod: PO | Performed by: NURSE PRACTITIONER

## 2023-10-11 PROCEDURE — 84443 ASSAY THYROID STIM HORMONE: CPT | Performed by: NURSE PRACTITIONER

## 2023-10-12 ENCOUNTER — TELEPHONE (OUTPATIENT)
Dept: ENDOCRINOLOGY | Facility: CLINIC | Age: 73
End: 2023-10-12
Payer: COMMERCIAL

## 2023-10-19 DIAGNOSIS — E11.9 TYPE 2 DIABETES MELLITUS WITHOUT COMPLICATION, WITHOUT LONG-TERM CURRENT USE OF INSULIN: ICD-10-CM

## 2023-10-19 DIAGNOSIS — E89.0 POSTABLATIVE HYPOTHYROIDISM: ICD-10-CM

## 2023-10-20 RX ORDER — METFORMIN HYDROCHLORIDE 500 MG/1
500 TABLET ORAL 2 TIMES DAILY WITH MEALS
Qty: 180 TABLET | Refills: 0 | Status: SHIPPED | OUTPATIENT
Start: 2023-10-20 | End: 2024-01-17

## 2023-10-20 RX ORDER — LEVOTHYROXINE SODIUM 88 UG/1
TABLET ORAL
Qty: 72 TABLET | Refills: 0 | Status: SHIPPED | OUTPATIENT
Start: 2023-10-20 | End: 2024-01-09

## 2023-11-26 NOTE — TELEPHONE ENCOUNTER
No care due was identified.  Health [facility] Embedded Care Due Messages. Reference number: [id number].   11/26/2023 11:21:35 AM CST

## 2023-11-27 RX ORDER — AMLODIPINE BESYLATE 5 MG/1
TABLET ORAL
Qty: 90 TABLET | Refills: 3 | Status: SHIPPED | OUTPATIENT
Start: 2023-11-27

## 2023-11-27 NOTE — TELEPHONE ENCOUNTER
Refill Routing Note   Medication(s) are not appropriate for processing by [facility] for the following reason(s):        Required vitals abnormal    [facility] action(s):  Defer               Appointments  past 12m or future 3m with PCP    Date Provider   Last Visit   9/7/2023 Lenny Javier MD   Next Visit   3/11/2024 Lenny Javier MD   ED visits in past 90 days: 0        Note composed:4:29 AM 11/27/2023

## 2024-01-08 DIAGNOSIS — E89.0 POSTABLATIVE HYPOTHYROIDISM: ICD-10-CM

## 2024-01-09 RX ORDER — LEVOTHYROXINE SODIUM 88 UG/1
TABLET ORAL
Qty: 72 TABLET | Refills: 0 | Status: SHIPPED | OUTPATIENT
Start: 2024-01-09

## 2024-01-17 DIAGNOSIS — E11.9 TYPE 2 DIABETES MELLITUS WITHOUT COMPLICATION, WITHOUT LONG-TERM CURRENT USE OF INSULIN: ICD-10-CM

## 2024-01-17 RX ORDER — METFORMIN HYDROCHLORIDE 500 MG/1
500 TABLET ORAL 2 TIMES DAILY WITH MEALS
Qty: 180 TABLET | Refills: 0 | Status: SHIPPED | OUTPATIENT
Start: 2024-01-17 | End: 2024-04-15

## 2024-02-08 ENCOUNTER — OFFICE VISIT (OUTPATIENT)
Dept: OPTOMETRY | Facility: CLINIC | Age: 74
End: 2024-02-08
Payer: COMMERCIAL

## 2024-02-08 DIAGNOSIS — H40.053 BILATERAL OCULAR HYPERTENSION: ICD-10-CM

## 2024-02-08 DIAGNOSIS — H26.9 CORTICAL CATARACT OF BOTH EYES: ICD-10-CM

## 2024-02-08 DIAGNOSIS — E11.9 DIABETIC EYE EXAM: Primary | ICD-10-CM

## 2024-02-08 DIAGNOSIS — H52.03 HYPEROPIA WITH ASTIGMATISM AND PRESBYOPIA, BILATERAL: ICD-10-CM

## 2024-02-08 DIAGNOSIS — H52.4 HYPEROPIA WITH ASTIGMATISM AND PRESBYOPIA, BILATERAL: ICD-10-CM

## 2024-02-08 DIAGNOSIS — H52.203 HYPEROPIA WITH ASTIGMATISM AND PRESBYOPIA, BILATERAL: ICD-10-CM

## 2024-02-08 DIAGNOSIS — Z01.00 DIABETIC EYE EXAM: Primary | ICD-10-CM

## 2024-02-08 DIAGNOSIS — E11.9 TYPE 2 DIABETES MELLITUS WITHOUT RETINOPATHY: ICD-10-CM

## 2024-02-08 DIAGNOSIS — H25.13 NUCLEAR SCLEROSIS, BILATERAL: ICD-10-CM

## 2024-02-08 PROCEDURE — 92014 COMPRE OPH EXAM EST PT 1/>: CPT | Mod: S$GLB,,, | Performed by: OPTOMETRIST

## 2024-02-08 PROCEDURE — 99999 PR PBB SHADOW E&M-EST. PATIENT-LVL III: CPT | Mod: PBBFAC,,, | Performed by: OPTOMETRIST

## 2024-02-08 PROCEDURE — 2023F DILAT RTA XM W/O RTNOPTHY: CPT | Mod: CPTII,S$GLB,, | Performed by: OPTOMETRIST

## 2024-02-08 PROCEDURE — 92015 DETERMINE REFRACTIVE STATE: CPT | Mod: S$GLB,,, | Performed by: OPTOMETRIST

## 2024-02-08 PROCEDURE — 1159F MED LIST DOCD IN RCRD: CPT | Mod: CPTII,S$GLB,, | Performed by: OPTOMETRIST

## 2024-02-08 RX ORDER — LATANOPROST 50 UG/ML
1 SOLUTION/ DROPS OPHTHALMIC NIGHTLY
Qty: 2.5 ML | Refills: 5 | Status: SHIPPED | OUTPATIENT
Start: 2024-02-08

## 2024-02-08 RX ORDER — TIMOLOL MALEATE 5 MG/ML
SOLUTION OPHTHALMIC
Qty: 5 ML | Refills: 5 | Status: SHIPPED | OUTPATIENT
Start: 2024-02-08

## 2024-02-08 NOTE — PATIENT INSTRUCTIONS
Nuclear sclerotic/cortical cataract in both eyes.  Still no need for cataract surgery in either eye, based on the best-corrected VA achieved with refraction today.  Continue to monitor     Type 2 diabetes without evidence of diabetic retinopathy in either eye.     Prior diagnosis of ocular hypertension.  No evidence of glaucomatous changes in the appearance of the optic nerve head in either eye.    Intraocular pressure remains within normal range in each eye today (14 mm Hg in the right eye  and 13 mm Hg in the left eye) - stable IOP in each eye.     HVF test (24-2 EMANUEL Standard) done at last visit.   Test results appeared non-reliable OD and OS, secondary to fixation losses and excessively high false positive responses in each eye.     PREVIOUSLY SUGGESTED REPEAT HVF test (24-2) IN FOUR TO SIX MONTHS THEREAFTER, BUT NOT DONE.  THUS SUGGEST REPEAT HVF TEST (24-2) WITHIN THE NEXT FEW WEEKS.      In the meantime, continue Latanoprost 0.005% every evening in both eyes, and continue Timolol Maleate 0.5% every morning in both eyes.     Hyperopia with astigmatism in each eye, with satisfactory best-corrected VA in each   Stable refractive error in each eye.   Presbyopia consistent with age   New spectacle lens Rx issued for full-time wear.  But no need to change lenses at this time.      Assistant to call to schedule HVF test (24-2) and follow-up appointment with me (Dr. Aquino) on the same date  Repeat refraction in one year

## 2024-02-08 NOTE — PROGRESS NOTES
HPI     diabetic eye exam            Comments: Annual diabetic eye exam and refraction.  No acute ocular/vision problems.'  Wears glasses full-time.   Prior dx of bilateral ocular hypertension.  Using timolol 0.5% ophthalmic   solution in the morning (only) in both eyes, and Latanoprost 0.005% in the   evening in both eyes for control/reduction of intraocular pressure (IOP)           Comments    Patient's age: 73 y.o. AA female   Occupation: Retired  Approximate date of last eye examination:  02/01/2023  Name of last eye doctor seen: Dr. Aquino  City/State: [facility]  Wears glasses? Yes     If yes, wears  Full-time or part-time?  Full-time  Present glasses are: Bifocal, SV Distance, SV Reading?  Progressive  Approximate age of present glasses:  1 year old   Got new glasses following last exam, or subsequently?:  Yes    Any problem with VA with glasses?  No  Wears CLs?:  No  Headaches?  No  Eye pain/discomfort?  No                                                                                     Flashes?  No  Floaters?  No  Diplopia/Double vision?  No  Patient's Ocular History:         Any eye surgeries? No         Any eye injury?  No         Any treatment for eye disease?  No  Family history of eye disease? Glaucoma and Cataracts (Maternal Aunts and   Uncles)  Significant patient medical history:         1. Diabetes?  Yes - type 2 x 10 + years       If yes, IDDM or NIDDM? NIDDM (taking Metformin and Ozempic)   2. HBP?  Yes - takes meds - states well controlled.               3. Other (describe):  N/A   ! OTC eyedrops currently using:  No   ! Prescription eye meds currently using:  Yes Timolol 0.5% qAM OU and   Latanoprost 0.005% qPM OU   ! Any history of allergy/adverse reaction to any eye meds used   previously?  No    ! Any history of allergy/adverse reaction to eyedrops used during prior   eye exam(s)? No    ! Any history of allergy/adverse reaction to Novacaine or similar meds?   No   ! Any history of  "allergy/adverse reaction to Epinephrine or similar meds?   No    ! Patient okay with use of anesthetic eyedrops to check eye pressure?    Yes         ! Patient okay with use of eyedrops to dilate pupils today?  Yes   !  Allergies/Medications/Medical History/Family History reviewed today?    Yes      PD =  69/65  Desired reading distance = 14.5"                                                                        Last edited by Cesario Aquino, OD on 2/8/2024 10:56 AM.            Assessment /Plan     For exam results, see Encounter Report.  1. Diabetic eye exam        2. Bilateral ocular hypertension  timolol maleate 0.5% (TIMOPTIC-XE) 0.5 % SolG    latanoprost 0.005 % ophthalmic solution    [place] Visual Field - OU - Extended - Both Eyes      3. Type 2 diabetes mellitus without retinopathy        4. Nuclear sclerosis, bilateral        5. Cortical cataract of both eyes        6. Hyperopia with astigmatism and presbyopia, bilateral                     Nuclear sclerotic/cortical cataract in both eyes.  Still no need for cataract surgery in either eye, based on the best-corrected VA achieved with refraction today.  Continue to monitor     Type 2 diabetes without evidence of diabetic retinopathy in either eye.     Prior diagnosis of ocular hypertension.  No evidence of glaucomatous changes in the appearance of the optic nerve head in either eye.    Intraocular pressure remains within normal range in each eye today (14 mm Hg in the right eye  and 13 mm Hg in the left eye) - stable IOP in each eye.     HVF test (24-2 EMANUEL Standard) done at last visit.   Test results appeared non-reliable OD and OS, secondary to fixation losses and excessively high false positive responses in each eye.     PREVIOUSLY SUGGESTED REPEAT HVF test (24-2) IN FOUR TO SIX MONTHS THEREAFTER, BUT NOT DONE.  THUS SUGGEST REPEAT HVF TEST (24-2) WITHIN THE NEXT FEW WEEKS.      In the meantime, continue Latanoprost 0.005% every evening in both " eyes, and continue Timolol Maleate 0.5% every morning in both eyes.     Hyperopia with astigmatism in each eye, with satisfactory best-corrected VA in each   Stable refractive error in each eye.   Presbyopia consistent with age   New spectacle lens Rx issued for full-time wear.  But no need to change lenses at this time.      Assistant to call to schedule HVF test (24-2) and follow-up appointment with me (Dr. Aquino) on the same date  Repeat refraction in one year

## 2024-02-28 DIAGNOSIS — E11.9 TYPE 2 DIABETES MELLITUS WITHOUT COMPLICATION: ICD-10-CM

## 2024-02-28 DIAGNOSIS — I10 ESSENTIAL HYPERTENSION: ICD-10-CM

## 2024-02-28 RX ORDER — LOSARTAN POTASSIUM 50 MG/1
50 TABLET ORAL
Qty: 90 TABLET | Refills: 1 | Status: SHIPPED | OUTPATIENT
Start: 2024-02-28

## 2024-02-28 NOTE — TELEPHONE ENCOUNTER
Refill Routing Note   Medication(s) are not appropriate for processing by [facility] for the following reason(s):        Required vitals abnormal    [facility] action(s):  Defer               Appointments  past 12m or future 3m with PCP    Date Provider   Last Visit   9/7/2023 Lenny Javier MD   Next Visit   3/11/2024 Lenny Javier MD   ED visits in past 90 days: 0        Note composed:2:33 PM 02/28/2024

## 2024-02-28 NOTE — TELEPHONE ENCOUNTER
No care due was identified.  Health [facility] Embedded Care Due Messages. Reference number: [id number].   2/28/2024 8:54:36 AM CST

## 2024-03-06 DIAGNOSIS — E11.9 TYPE 2 DIABETES MELLITUS WITHOUT COMPLICATION: ICD-10-CM

## 2024-03-07 ENCOUNTER — TELEPHONE (OUTPATIENT)
Dept: ENDOCRINOLOGY | Facility: CLINIC | Age: 74
End: 2024-03-07
Payer: COMMERCIAL

## 2024-03-11 ENCOUNTER — LAB VISIT (OUTPATIENT)
Dept: LAB | Facility: HOSPITAL | Age: 74
End: 2024-03-11
Attending: FAMILY MEDICINE
Payer: COMMERCIAL

## 2024-03-11 ENCOUNTER — OFFICE VISIT (OUTPATIENT)
Dept: FAMILY MEDICINE | Facility: CLINIC | Age: 74
End: 2024-03-11
Payer: COMMERCIAL

## 2024-03-11 VITALS
SYSTOLIC BLOOD PRESSURE: 130 MMHG | HEART RATE: 66 BPM | BODY MASS INDEX: 33.25 KG/M2 | OXYGEN SATURATION: 99 % | HEIGHT: 61 IN | WEIGHT: 176.13 LBS | DIASTOLIC BLOOD PRESSURE: 64 MMHG | TEMPERATURE: 98 F

## 2024-03-11 DIAGNOSIS — E11.22 DIABETES MELLITUS WITH STAGE 3 CHRONIC KIDNEY DISEASE: ICD-10-CM

## 2024-03-11 DIAGNOSIS — Z00.00 ROUTINE GENERAL MEDICAL EXAMINATION AT A HEALTH CARE FACILITY: Primary | ICD-10-CM

## 2024-03-11 DIAGNOSIS — N18.30 DIABETES MELLITUS WITH STAGE 3 CHRONIC KIDNEY DISEASE: ICD-10-CM

## 2024-03-11 DIAGNOSIS — E03.9 HYPOTHYROIDISM, UNSPECIFIED TYPE: ICD-10-CM

## 2024-03-11 DIAGNOSIS — N18.30 STAGE 3 CHRONIC KIDNEY DISEASE, UNSPECIFIED WHETHER STAGE 3A OR 3B CKD: ICD-10-CM

## 2024-03-11 DIAGNOSIS — M81.8 OTHER OSTEOPOROSIS WITHOUT CURRENT PATHOLOGICAL FRACTURE: ICD-10-CM

## 2024-03-11 DIAGNOSIS — Z12.31 BREAST CANCER SCREENING BY MAMMOGRAM: ICD-10-CM

## 2024-03-11 DIAGNOSIS — I10 ESSENTIAL HYPERTENSION: ICD-10-CM

## 2024-03-11 LAB
25(OH)D3+25(OH)D2 SERPL-MCNC: 61 NG/ML (ref 30–96)
ALBUMIN SERPL BCP-MCNC: 3.6 G/DL (ref 3.5–5.2)
ALBUMIN/CREAT UR: 148.9 UG/MG (ref 0–30)
ALP SERPL-CCNC: 72 U/L (ref 55–135)
ALT SERPL W/O P-5'-P-CCNC: 28 U/L (ref 10–44)
ANION GAP SERPL CALC-SCNC: 8 MMOL/L (ref 8–16)
AST SERPL-CCNC: 19 U/L (ref 10–40)
BASOPHILS # BLD AUTO: 0.03 K/UL (ref 0–0.2)
BASOPHILS NFR BLD: 0.3 % (ref 0–1.9)
BILIRUB SERPL-MCNC: 0.5 MG/DL (ref 0.1–1)
BUN SERPL-MCNC: 23 MG/DL (ref 8–23)
CALCIUM SERPL-MCNC: 10.3 MG/DL (ref 8.7–10.5)
CHLORIDE SERPL-SCNC: 105 MMOL/L (ref 95–110)
CHOLEST SERPL-MCNC: 78 MG/DL (ref 120–199)
CHOLEST/HDLC SERPL: 2.6 {RATIO} (ref 2–5)
CO2 SERPL-SCNC: 27 MMOL/L (ref 23–29)
CREAT SERPL-MCNC: 1.4 MG/DL (ref 0.5–1.4)
CREAT UR-MCNC: 413 MG/DL (ref 15–325)
DIFFERENTIAL METHOD BLD: NORMAL
EOSINOPHIL # BLD AUTO: 0.1 K/UL (ref 0–0.5)
EOSINOPHIL NFR BLD: 1.6 % (ref 0–8)
ERYTHROCYTE [DISTWIDTH] IN BLOOD BY AUTOMATED COUNT: 14.5 % (ref 11.5–14.5)
EST. GFR  (NO RACE VARIABLE): 40 ML/MIN/1.73 M^2
ESTIMATED AVG GLUCOSE: 97 MG/DL (ref 68–131)
GLUCOSE SERPL-MCNC: 85 MG/DL (ref 70–110)
HBA1C MFR BLD: 5 % (ref 4–5.6)
HCT VFR BLD AUTO: 41.6 % (ref 37–48.5)
HDLC SERPL-MCNC: 30 MG/DL (ref 40–75)
HDLC SERPL: 38.5 % (ref 20–50)
HGB BLD-MCNC: 13.5 G/DL (ref 12–16)
IMM GRANULOCYTES # BLD AUTO: 0.02 K/UL (ref 0–0.04)
IMM GRANULOCYTES NFR BLD AUTO: 0.2 % (ref 0–0.5)
LDLC SERPL CALC-MCNC: 29.6 MG/DL (ref 63–159)
LYMPHOCYTES # BLD AUTO: 3 K/UL (ref 1–4.8)
LYMPHOCYTES NFR BLD: 33.4 % (ref 18–48)
MCH RBC QN AUTO: 27.9 PG (ref 27–31)
MCHC RBC AUTO-ENTMCNC: 32.5 G/DL (ref 32–36)
MCV RBC AUTO: 86 FL (ref 82–98)
MICROALBUMIN UR DL<=1MG/L-MCNC: 615 UG/ML
MONOCYTES # BLD AUTO: 0.7 K/UL (ref 0.3–1)
MONOCYTES NFR BLD: 7.5 % (ref 4–15)
NEUTROPHILS # BLD AUTO: 5.1 K/UL (ref 1.8–7.7)
NEUTROPHILS NFR BLD: 57 % (ref 38–73)
NONHDLC SERPL-MCNC: 48 MG/DL
NRBC BLD-RTO: 0 /100 WBC
PLATELET # BLD AUTO: 155 K/UL (ref 150–450)
PMV BLD AUTO: 12.2 FL (ref 9.2–12.9)
POTASSIUM SERPL-SCNC: 4.6 MMOL/L (ref 3.5–5.1)
PROT SERPL-MCNC: 7.4 G/DL (ref 6–8.4)
RBC # BLD AUTO: 4.84 M/UL (ref 4–5.4)
SODIUM SERPL-SCNC: 140 MMOL/L (ref 136–145)
T4 FREE SERPL-MCNC: 1.27 NG/DL (ref 0.71–1.51)
TRIGL SERPL-MCNC: 92 MG/DL (ref 30–150)
TSH SERPL DL<=0.005 MIU/L-ACNC: 0.24 UIU/ML (ref 0.4–4)
WBC # BLD AUTO: 8.93 K/UL (ref 3.9–12.7)

## 2024-03-11 PROCEDURE — 36415 COLL VENOUS BLD VENIPUNCTURE: CPT | Performed by: FAMILY MEDICINE

## 2024-03-11 PROCEDURE — 3078F DIAST BP <80 MM HG: CPT | Mod: CPTII,S$GLB,, | Performed by: FAMILY MEDICINE

## 2024-03-11 PROCEDURE — 99999 PR PBB SHADOW E&M-EST. PATIENT-LVL IV: CPT | Mod: PBBFAC,,, | Performed by: FAMILY MEDICINE

## 2024-03-11 PROCEDURE — 82306 VITAMIN D 25 HYDROXY: CPT | Performed by: FAMILY MEDICINE

## 2024-03-11 PROCEDURE — 4010F ACE/ARB THERAPY RXD/TAKEN: CPT | Mod: CPTII,S$GLB,, | Performed by: FAMILY MEDICINE

## 2024-03-11 PROCEDURE — 3288F FALL RISK ASSESSMENT DOCD: CPT | Mod: CPTII,S$GLB,, | Performed by: FAMILY MEDICINE

## 2024-03-11 PROCEDURE — 1101F PT FALLS ASSESS-DOCD LE1/YR: CPT | Mod: CPTII,S$GLB,, | Performed by: FAMILY MEDICINE

## 2024-03-11 PROCEDURE — 99397 PER PM REEVAL EST PAT 65+ YR: CPT | Mod: S$GLB,,, | Performed by: FAMILY MEDICINE

## 2024-03-11 PROCEDURE — 85025 COMPLETE CBC W/AUTO DIFF WBC: CPT | Performed by: FAMILY MEDICINE

## 2024-03-11 PROCEDURE — 80053 COMPREHEN METABOLIC PANEL: CPT | Performed by: FAMILY MEDICINE

## 2024-03-11 PROCEDURE — 84443 ASSAY THYROID STIM HORMONE: CPT | Performed by: FAMILY MEDICINE

## 2024-03-11 PROCEDURE — 84439 ASSAY OF FREE THYROXINE: CPT | Performed by: FAMILY MEDICINE

## 2024-03-11 PROCEDURE — 1159F MED LIST DOCD IN RCRD: CPT | Mod: CPTII,S$GLB,, | Performed by: FAMILY MEDICINE

## 2024-03-11 PROCEDURE — 82043 UR ALBUMIN QUANTITATIVE: CPT | Performed by: FAMILY MEDICINE

## 2024-03-11 PROCEDURE — 83036 HEMOGLOBIN GLYCOSYLATED A1C: CPT | Performed by: FAMILY MEDICINE

## 2024-03-11 PROCEDURE — 80061 LIPID PANEL: CPT | Performed by: FAMILY MEDICINE

## 2024-03-11 PROCEDURE — 3008F BODY MASS INDEX DOCD: CPT | Mod: CPTII,S$GLB,, | Performed by: FAMILY MEDICINE

## 2024-03-11 PROCEDURE — 3075F SYST BP GE 130 - 139MM HG: CPT | Mod: CPTII,S$GLB,, | Performed by: FAMILY MEDICINE

## 2024-03-11 PROCEDURE — 1126F AMNT PAIN NOTED NONE PRSNT: CPT | Mod: CPTII,S$GLB,, | Performed by: FAMILY MEDICINE

## 2024-03-11 NOTE — PROGRESS NOTES
(Portions of this note were dictated using voice recognition software and may contain dictation related errors in spelling/grammar/syntax not found on text review)    CC:   Chief Complaint   Patient presents with    Follow-up         HPI: 73 y.o. female    Hypertension on losartan 50 mg daily  amlodipine 5 mg daily  .  BP stable .Had lost significant amount of weight intentionally through healthy diet.    Was on hydrochlorothiazide but given decreasing renal function as below, she was advised to stay off this.  Blood pressure stable even off thiazide.  Does not take any NSAIDs.       Hyperlipidemia, Crestor 40 mg daily.      Diabetes with CKD 3, followed by endocrinology.  controlled on metformin 500 b.i.d. , Ozempic 0.5 mg weekly     Hypothyroidism on Synthroid, currently on 88 mcg daily.  Was on 6 days a week but because of elevated TSH was advised to increase it to 1 pill daily.       Osteoporosis, started on Prolia.  DEXA           Past Medical History:   Diagnosis Date    Cataract     CKD (chronic kidney disease) stage 3, GFR 30-59 ml/min 2014    Diabetes mellitus type II     GERD (gastroesophageal reflux disease)     Glaucoma (increased eye pressure)     HLD (hyperlipidemia)     HTN (hypertension)     Hypothyroidism     Morbid obesity 2014    Osteoporosis     Vitamin D deficiency 2014       Past Surgical History:   Procedure Laterality Date     SECTION, LOW TRANSVERSE         Family History   Problem Relation Age of Onset    Coronary artery disease Mother         premature, 57    No Known Problems Father     Cancer Sister     Breast cancer Sister 33    No Known Problems Maternal Aunt     Glaucoma Maternal Uncle     Diabetes Maternal Uncle     No Known Problems Paternal Aunt     No Known Problems Paternal Uncle     No Known Problems Maternal Grandmother     No Known Problems Maternal Grandfather     No Known Problems Paternal Grandmother     No Known Problems Paternal Grandfather      No Known Problems Brother     Diabetes Other     Hypertension Other     Thyroid disease Other     Amblyopia Neg Hx     Blindness Neg Hx     Cataracts Neg Hx     Macular degeneration Neg Hx     Retinal detachment Neg Hx     Strabismus Neg Hx     Stroke Neg Hx        Social History     Socioeconomic History    Marital status:    Tobacco Use    Smoking status: Former     Types: Cigarettes    Smokeless tobacco: Never    Tobacco comments:     occasionally   Substance and Sexual Activity    Alcohol use: No    Drug use: No       Lab Results   Component Value Date    WBC 8.84 02/27/2023    HGB 12.1 02/27/2023    HCT 39.2 02/27/2023    MCV 89 02/27/2023     02/27/2023    CHOL 85 (L) 02/27/2023    TRIG 64 02/27/2023    HDL 31 (L) 02/27/2023    ALT 31 08/30/2023    AST 32 08/30/2023    BILITOT 0.4 08/30/2023    ALKPHOS 73 08/30/2023     08/30/2023    K 4.4 08/30/2023     08/30/2023    CREATININE 1.3 08/30/2023    ESTGFRAFRICA 53.3 (A) 05/30/2022    EGFRNONAA 46.2 (A) 05/30/2022    EGFRNORACEVR 44.2 (A) 08/30/2023    CALCIUM 10.4 08/30/2023    ALBUMIN 3.7 08/30/2023    BUN 23 08/30/2023    CO2 24 08/30/2023    TSH 0.995 10/11/2023    INR 1.1 01/25/2005    HGBA1C 5.4 08/30/2023    MICALBCREAT 82.5 (H) 05/30/2022    LDLCALC 41.2 (L) 02/27/2023    GLU 96 08/30/2023    [id number] 64 09/08/2022             Hemoglobin A1C (%)   Date Value   08/30/2023 5.4   02/27/2023 5.4   10/17/2022 5.4   09/08/2022 5.3   05/30/2022 5.4           eGFR if non African American (mL/min/1.73 m^2)   Date Value   05/30/2022 46.2 (A)   09/01/2021 35.3 (A)   05/26/2021 51.7 (A)   02/26/2020 47.4 (A)   11/16/2018 44.5 (A)   06/16/2017 48.4 (A)   03/07/2017 44.7 (A)   11/03/2016 48 (A)   06/02/2016 53.6 (A)   11/02/2015 >60     eGFR (mL/min/1.73 m^2)   Date Value   08/30/2023 44.2 (A)   02/27/2023 37.3 (A)   11/21/2022 37.3 (A)   10/17/2022 32.1 (A)   09/08/2022 44.2 (A)     Creatinine (mg/dL)   Date Value   08/30/2023 1.3  "  02/27/2023 1.5 (H)   11/21/2022 1.5 (H)   10/17/2022 1.7 (H)   09/08/2022 1.3   05/30/2022 1.2   09/01/2021 1.5 (H)   05/26/2021 1.1   02/26/2020 1.19   11/16/2018 1.26                   Vital signs reviewed  Vitals:    03/11/24 1024   BP: 130/64   BP Location: Left arm   Patient Position: Sitting   BP Method: Medium (Manual)   Pulse: 66   Temp: 97.7 °F (36.5 °C)   TempSrc: Oral   SpO2: 99%   Weight: 79.9 kg (176 lb 2.4 oz)   Height: 5' 1" (1.549 m)     Wt Readings from Last 10 Encounters:   03/11/24 79.9 kg (176 lb 2.4 oz)   10/02/23 90.1 kg (198 lb 10.2 oz)   09/07/23 90.1 kg (198 lb 10.2 oz)   08/30/23 89.4 kg (197 lb 1.5 oz)   03/29/23 84.7 kg (186 lb 11.7 oz)   03/06/23 84.7 kg (186 lb 11.7 oz)   11/28/22 84.3 kg (185 lb 13.6 oz)   08/25/22 84.4 kg (186 lb 1.1 oz)   05/26/22 83.7 kg (184 lb 8.4 oz)   03/24/22 96.8 kg (213 lb 6.5 oz)       PE:   APPEARANCE: Well nourished, well developed, in no acute distress.    HEAD: Normocephalic, atraumatic.  NECK: Supple with no cervical lymphadenopathy.  No carotid bruits.  No thyromegaly  CHEST: Good inspiratory effort. Lungs clear to auscultation with no wheezes or crackles.  CARDIOVASCULAR: Normal S1, S2. No rubs, murmurs, or gallops.  ABDOMEN: Bowel sounds normal. Not distended. Soft. No tenderness or masses. No organomegaly.  EXTREMITIES: No edema .  Pain over greater trochanter on left side  DIABETIC FOOT EXAM utd through endo 8/30/23            IMPRESSION  1. Routine general medical examination at a health care facility    2. Hypothyroidism, unspecified type    3. Stage 3 chronic kidney disease, unspecified whether stage 3a or 3b CKD    4. Essential hypertension    5. Other osteoporosis without current pathological fracture    6. Diabetes mellitus with stage 3 chronic kidney disease    7. Breast cancer screening by mammogram                PLAN          Diabetes health maintenance:  -- advise regular and consistent glucose monitoring and medication compliance.  -- " advise daily foot checks  -- advise yearly ophthalmologic exams    -- advise adequate dietary and exercise modification  -- advise regular dental visits  -- advise daily low-dose aspirin use if patient is not on other anticoagulants and there are no other contraindications.  -- Medication management:  Continue metformin 500 b.i.d. Ozempic 0.5 mg weekly (followed by endocrinology)    Dyslipidemia:  Continue Crestor    Hypothyroidism:  Continue Synthroid 88 mcg daily.  Trend TSH    Hypertension:   amlodipine 5 mg daily.   losartan   50 mg daily    Osteoporosis:  Prolia q.6 months.    CKD: Continue to monitor labs.  BP stable.  Diabetes has been well controlled.     Labs below     Follow-up in 6 months  or sooner p.r.n.    Orders Placed This Encounter   Procedures    Mammo Digital Screening Bilat w/ Servando    CBC Auto Differential    Comprehensive Metabolic Panel    Lipid Panel    TSH    Hemoglobin A1C    Microalbumin/Creatinine Ratio, Urine    Vitamin D               HEALTH SCREENINGS  Immunization:  Tdap in 2015   Pneumovax : 9/24/12, 2018  Flu: utd  Pcv:  09/22/2017  CoVID vaccine booster    up-to-date  Zoster up-to-date     Shingles vaccine up-to-date  Mammogram March 2023, normal, reordered     Pap:2016     Colonoscopy 2008. Multiple small hyperplastic polyps removed. Repeat in 7-10 years, Fit on 09/12/2023    DEXA scan:  02/26/2020.  Osteopenia:  L-spine T-score is-1.6.  Left Femoral neck T-score is-1.4.  Right femoral neck T-score -1.5,  DEXA scan 02/27/2023.  Lumbar T-score is-3.1 area left femoral neck T-score is-2.7.

## 2024-03-13 ENCOUNTER — TELEPHONE (OUTPATIENT)
Dept: FAMILY MEDICINE | Facility: CLINIC | Age: 74
End: 2024-03-13
Payer: COMMERCIAL

## 2024-03-13 DIAGNOSIS — N18.30 STAGE 3 CHRONIC KIDNEY DISEASE, UNSPECIFIED WHETHER STAGE 3A OR 3B CKD: ICD-10-CM

## 2024-03-13 DIAGNOSIS — E03.9 HYPOTHYROIDISM, UNSPECIFIED TYPE: Primary | ICD-10-CM

## 2024-03-13 NOTE — TELEPHONE ENCOUNTER
Called patient and went over all of her results. Scheduled repeat non-fasting lab in 1 month and told her to try to stay well hydrated. She verbalized understanding.

## 2024-03-13 NOTE — TELEPHONE ENCOUNTER
Please tell patient that diabetes test is controlled.  Kidney test is still slow but generally stable, slightly a little bit lower than last time.  Stay well hydrated.  Thyroid test was slightly overactive but the actual thyroid hormone was normal range.  I would just recommend updating a thyroid test in the next 2-3 months along with CMP to check kidney function.  Does not need to be fasting.    Orders Placed This Encounter   Procedures    TSH    Comprehensive metabolic panel

## 2024-03-18 DIAGNOSIS — E89.0 POSTABLATIVE HYPOTHYROIDISM: ICD-10-CM

## 2024-03-18 RX ORDER — LEVOTHYROXINE SODIUM 88 UG/1
TABLET ORAL
Qty: 72 TABLET | Refills: 0 | Status: SHIPPED | OUTPATIENT
Start: 2024-03-18 | End: 2024-06-07

## 2024-04-02 ENCOUNTER — HOSPITAL ENCOUNTER (OUTPATIENT)
Dept: RADIOLOGY | Facility: HOSPITAL | Age: 74
Discharge: HOME | End: 2024-04-02
Attending: FAMILY MEDICINE
Payer: COMMERCIAL

## 2024-04-02 DIAGNOSIS — I10 ESSENTIAL HYPERTENSION: ICD-10-CM

## 2024-04-02 DIAGNOSIS — Z12.31 BREAST CANCER SCREENING BY MAMMOGRAM: ICD-10-CM

## 2024-04-02 DIAGNOSIS — N18.30 DIABETES MELLITUS WITH STAGE 3 CHRONIC KIDNEY DISEASE: ICD-10-CM

## 2024-04-02 DIAGNOSIS — E03.9 HYPOTHYROIDISM, UNSPECIFIED TYPE: ICD-10-CM

## 2024-04-02 DIAGNOSIS — E11.22 DIABETES MELLITUS WITH STAGE 3 CHRONIC KIDNEY DISEASE: ICD-10-CM

## 2024-04-02 DIAGNOSIS — M81.8 OTHER OSTEOPOROSIS WITHOUT CURRENT PATHOLOGICAL FRACTURE: ICD-10-CM

## 2024-04-02 DIAGNOSIS — N18.30 STAGE 3 CHRONIC KIDNEY DISEASE, UNSPECIFIED WHETHER STAGE 3A OR 3B CKD: ICD-10-CM

## 2024-04-02 PROCEDURE — 77067 SCR MAMMO BI INCL CAD: CPT | Mod: TC,PO

## 2024-04-03 ENCOUNTER — TELEPHONE (OUTPATIENT)
Dept: FAMILY MEDICINE | Facility: CLINIC | Age: 74
End: 2024-04-03
Payer: COMMERCIAL

## 2024-04-03 ENCOUNTER — INFUSION (OUTPATIENT)
Dept: INFUSION THERAPY | Facility: HOSPITAL | Age: 74
End: 2024-04-03
Attending: INTERNAL MEDICINE
Payer: COMMERCIAL

## 2024-04-03 VITALS
TEMPERATURE: 98 F | SYSTOLIC BLOOD PRESSURE: 141 MMHG | DIASTOLIC BLOOD PRESSURE: 60 MMHG | OXYGEN SATURATION: 99 % | RESPIRATION RATE: 18 BRPM | HEART RATE: 61 BPM

## 2024-04-03 DIAGNOSIS — M81.0 OSTEOPOROSIS, UNSPECIFIED OSTEOPOROSIS TYPE, UNSPECIFIED PATHOLOGICAL FRACTURE PRESENCE: Primary | ICD-10-CM

## 2024-04-03 PROCEDURE — 96372 THER/PROPH/DIAG INJ SC/IM: CPT

## 2024-04-03 PROCEDURE — 63600175 PHARM REV CODE 636 W HCPCS: Mod: JZ,JG | Performed by: FAMILY MEDICINE

## 2024-04-03 RX ADMIN — DENOSUMAB 60 MG: 60 INJECTION SUBCUTANEOUS at 10:04

## 2024-04-03 NOTE — TELEPHONE ENCOUNTER
Patient is in infusion office for Prolia however the orders aren't signed.  Can orders be placed?

## 2024-04-09 ENCOUNTER — LAB VISIT (OUTPATIENT)
Dept: LAB | Facility: HOSPITAL | Age: 74
End: 2024-04-09
Attending: FAMILY MEDICINE
Payer: COMMERCIAL

## 2024-04-09 DIAGNOSIS — N18.30 STAGE 3 CHRONIC KIDNEY DISEASE, UNSPECIFIED WHETHER STAGE 3A OR 3B CKD: ICD-10-CM

## 2024-04-09 DIAGNOSIS — E03.9 HYPOTHYROIDISM, UNSPECIFIED TYPE: ICD-10-CM

## 2024-04-09 LAB
ALBUMIN SERPL BCP-MCNC: 3.4 G/DL (ref 3.5–5.2)
ALP SERPL-CCNC: 77 U/L (ref 55–135)
ALT SERPL W/O P-5'-P-CCNC: 44 U/L (ref 10–44)
ANION GAP SERPL CALC-SCNC: 7 MMOL/L (ref 8–16)
AST SERPL-CCNC: 35 U/L (ref 10–40)
BILIRUB SERPL-MCNC: 0.5 MG/DL (ref 0.1–1)
BUN SERPL-MCNC: 14 MG/DL (ref 8–23)
CALCIUM SERPL-MCNC: 9.9 MG/DL (ref 8.7–10.5)
CHLORIDE SERPL-SCNC: 109 MMOL/L (ref 95–110)
CO2 SERPL-SCNC: 23 MMOL/L (ref 23–29)
CREAT SERPL-MCNC: 1.2 MG/DL (ref 0.5–1.4)
EST. GFR  (NO RACE VARIABLE): 48.4 ML/MIN/1.73 M^2
GLUCOSE SERPL-MCNC: 80 MG/DL (ref 70–110)
POTASSIUM SERPL-SCNC: 4.2 MMOL/L (ref 3.5–5.1)
PROT SERPL-MCNC: 7.2 G/DL (ref 6–8.4)
SODIUM SERPL-SCNC: 139 MMOL/L (ref 136–145)
T4 FREE SERPL-MCNC: 1.15 NG/DL (ref 0.71–1.51)
TSH SERPL DL<=0.005 MIU/L-ACNC: 0.12 UIU/ML (ref 0.4–4)

## 2024-04-09 PROCEDURE — 80053 COMPREHEN METABOLIC PANEL: CPT | Performed by: FAMILY MEDICINE

## 2024-04-09 PROCEDURE — 84439 ASSAY OF FREE THYROXINE: CPT | Performed by: FAMILY MEDICINE

## 2024-04-09 PROCEDURE — 84443 ASSAY THYROID STIM HORMONE: CPT | Performed by: FAMILY MEDICINE

## 2024-04-09 PROCEDURE — 36415 COLL VENOUS BLD VENIPUNCTURE: CPT | Mod: PO | Performed by: FAMILY MEDICINE

## 2024-04-10 ENCOUNTER — TELEPHONE (OUTPATIENT)
Dept: FAMILY MEDICINE | Facility: CLINIC | Age: 74
End: 2024-04-10
Payer: COMMERCIAL

## 2024-04-10 NOTE — TELEPHONE ENCOUNTER
Please tell patient that her kidney function is slow but it is improving.  Stay well hydrated.  Continue current medications.  1 over thyroid test shows possibly your thyroid is a bit more overactive although her thyroid hormone level was normal.  I believe she is on her Synthroid 88 mcg 6 days out of the week per her endocrinologist recommendations.  I see she has an appointment with her endocrinologist coming up on 04/19/2024.  She can keep her current dose as is right now until she talks to her to see if any further adjustment as needed

## 2024-04-11 NOTE — TELEPHONE ENCOUNTER
Informed patient that her kidney function is slow, but it is improving. Stay well hydrated. Continue current medications. 1 over thyroid test shows possibly your thyroid is a bit more overactive although her thyroid hormone level was normal. I believe she is on her Synthroid 88 mcg 6 days out of the week per her endocrinologist recommendations. I see she has an appointment with her endocrinologist coming up on 04/19/2024. She can keep her current dose as is right now until she talks to her to see if any further adjustment as needed. Pt stated that she will further discuss with her Endo doctor.

## 2024-04-14 DIAGNOSIS — E11.9 TYPE 2 DIABETES MELLITUS WITHOUT COMPLICATION, WITHOUT LONG-TERM CURRENT USE OF INSULIN: ICD-10-CM

## 2024-04-15 RX ORDER — METFORMIN HYDROCHLORIDE 500 MG/1
500 TABLET ORAL 2 TIMES DAILY WITH MEALS
Qty: 180 TABLET | Refills: 0 | Status: SHIPPED | OUTPATIENT
Start: 2024-04-15

## 2024-04-18 NOTE — ASSESSMENT & PLAN NOTE
RDA of vitamin D 1000 IU daily  RDA of calcium is 7570-1458 mg daily, preferably from food  Avoid falls     Continue Prolia; last done in April 2024  Started in March 2023; last dose in 2/2025

## 2024-04-18 NOTE — ASSESSMENT & PLAN NOTE
A1c at goal   Check A1c every 6 months     Medication Changes   Continue Metformin 500 mg two tabs daily  Increase Ozempic to 0.5 mg weekly. If you have increased constipation, decrease Ozempic to 0.25 + 5 clicks.  If you have hypoglycemia blood sugar less than 70, then decrease Metformin to one tab daily

## 2024-04-18 NOTE — ASSESSMENT & PLAN NOTE
Last labs were hypothyroid in April 2024   States she was started on Levothyroxine 88 mcg daily per PCP               Decrease Levothyroxine 88 mcg Monday-Saturday              Check Tsh in 6 weeks   Discussed she needs less thyroid hormone because she lost weight!

## 2024-04-18 NOTE — PROGRESS NOTES
Subjective:      Patient ID: Hunter Cisneros is a 73 y.o. female.    Chief Complaint:  No chief complaint on file.    History of Present Illness: Hunter Cisneros is a 73 y.o. woman with Type 2 DM, HTN, CKD stage 3, postablative hypothyroidism, vitamin d deficiency, dyslipidemia, osteopenia, GERD, and ocular hypertension presents for follow up of Type 2 DM. Previous patient of mine. Last visit with myself in Aug 2023    Denies changes in medical history since her last visit    At her last visit we added Ozempic. 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    Denies palpitations   + slight tremors   + mental fog and constipation   + fatigue   - diarrhea     Lab Results   Component Value Date    TSH 0.124 (L) 04/09/2024     With regards to the diabetes:    Diagnosed with Type 2 DM July 2011  Family History of Type 2 DM: maternal uncles  Known complications:  DKA/HHS: denies  RN: denies; up to date  PN: on gabapentin  Nephropathy: denies  Gastroparesis: denies  CAD: denies    Current regimen:  On metformin 500 mg twice daily  Ozempic 0.25 mg weekly for 4 weeks alternating with Ozempic 0.5 mg weekly for 2 weeks   Denies increased constipation when she takes higher dose of Ozempic     Missed doses-denies     Other medications tried:  invokana-too expensive    1-2 # times a day testing  Log reviewed: none Oral recall normal range     Hypoglycemic event- Denies and denies s/s of hypoglycemia   Knows how to correct with 15 grams of carbs- juice, coke, or a peppermint.     Dietary recall: eating less since last visit since addition of Ozempic    Eats 3 meals a day.    Snacks: little bit             Education - last visit: fawad declines    Diabetes Management Status  Statin: Taking  ACE/ARB: Taking    Denies personal history of pancreatitis or FH of thyroid cancer.     Lab Results   Component Value Date    HGBA1C 5.0 03/11/2024    HGBA1C 5.4 08/30/2023    HGBA1C 5.4 02/27/2023     Screening or Prevention Patient's value Goal Complete/Controlled?   HgA1C Testing and Control   Lab Results   Component Value Date    HGBA1C 5.0 03/11/2024      Annually/Less than 8% Yes   Lipid profile : 03/11/2024 Annually Yes   LDL control Lab Results   Component Value Date    LDLCALC 29.6 (L) 03/11/2024    Annually/Less than 100 mg/dl  Yes   Nephropathy screening Lab Results   Component Value Date    LABMICR 615.0 03/11/2024     Lab Results   Component Value Date    PROTEINUA Negative 04/25/2014    Annually No   Blood pressure BP Readings from Last 1 Encounters:   04/19/24 138/74    Less than 140/90 Yes   Dilated retinal exam : 02/08/2024 Annually No   Foot exam   : 09/07/2023 Annually No     With regards to obesity,     Weight loss as above  Change in diet    Body mass index is 32.57 kg/m².    With regards to osteoporosis and Vit D deficiency,     Taking Vit D 1000 iu daily  Taking caltrate 1 tabs daily; calcium in diet  Denies fractures or falls     She was started on Prolia per PCP in March 2023.  Last dose in April 2024    She denies falls since her last visit.   Social alcohol   Rare tobacco    She is walking for exercise   She is using cane today.  No formal exercise.   Denies need for dental work-has dentures     2/27/2023 DXA  COMPARISON:  02/26/2020.     FINDINGS:  The L1 to L4 vertebral bone mineral density is equal to 0.808 g/cm squared with a T score of -3.1.   The left femoral neck bone mineral density is equal to 0.568 g/cm squared with a T score of -2.7.   The total hip bone mineral density is equal to 0.721 g/cm squared with a T score of -2.0.   There is a 13% risk of a major  osteoporotic fracture and a 3.2% risk of hip fracture in the next 10 years (FRAX).   Impression:   Osteoporosis    Vit D, 25-Hydroxy   Date Value Ref Range Status   03/11/2024 61 30 - 96 ng/mL Final     Comment:     Vitamin D deficiency.........<10 ng/mL                              Vitamin D insufficiency......10-29 ng/mL       Vitamin D sufficiency........> or equal to 30 ng/mL  Vitamin D toxicity............>100 ng/mL       With regards to hypertension,     She is taking losartan-HCTZ  BP at goal today    With regards to dyslipidemia,      She is on crestor 40 mg qhs    Results for LEXII WALDROP (MRN [id number]) as of 8/17/2021 08:58  Lab Results   Component Value Date    LDLCALC 29.6 (L) 03/11/2024     Review of Systems   Constitutional:  Positive for fatigue. Negative for unexpected weight change.   Eyes:  Negative for visual disturbance.   Endocrine: Negative for polydipsia, polyphagia and polyuria.     Objective:      Vitals:    04/19/24 1258   BP: 138/74   Pulse: 75     Physical Exam  Vitals reviewed.   Constitutional:       Appearance: She is well-developed.   Neck:      Thyroid: No thyromegaly.   Pulmonary:      Effort: Pulmonary effort is normal.   Abdominal:      Palpations: Abdomen is soft.   Musculoskeletal:      Comments: Using cane       Lab Review:   Lab Results   Component Value Date    HGBA1C 5.0 03/11/2024     Lab Results   Component Value Date    TSH 0.124 (L) 04/09/2024       Assessment:     1. Type 2 diabetes mellitus without complication, without long-term current use of insulin  TSH      2. Postablative hypothyroidism  OZEMPIC 0.25 mg or 0.5 mg (2 mg/3 mL) pen injector      3. Osteoporosis, unspecified osteoporosis type, unspecified pathological fracture presence        4. BMI 35.0-35.9,adult        5. Mixed hyperlipidemia        6. Essential hypertension        7. Vitamin D deficiency          Plan:     Type 2 diabetes mellitus without complication  A1c at goal   Check A1c every 6 months      Medication Changes   Continue Metformin 500 mg two tabs daily  Increase Ozempic to 0.5 mg weekly. If you have increased constipation, decrease Ozempic to 0.25 + 5 clicks.  If you have hypoglycemia blood sugar less than 70, then decrease Metformin to one tab daily    Postablative hypothyroidism              Last labs were hypothyroid in April 2024   States she was started on Levothyroxine 88 mcg daily per PCP               Decrease Levothyroxine 88 mcg Monday-Saturday              Check Tsh in 6 weeks   Discussed she needs less thyroid hormone because she lost weight!    Osteoporosis  RDA of vitamin D 1000 IU daily  RDA of calcium is 5638-8226 mg daily, preferably from food  Avoid falls     Continue Prolia; last done in April 2024  Started in March 2023; last dose in 2/2025      BMI 35.0-35.9,adult  Excellent weight loss with recent diet changes    Mixed hyperlipidemia  LDL at goal  Continue statin    Essential hypertension  BP slightly above goal    Vitamin D deficiency  Update with next labs    Visit today included increased complexity associated with the care of episodic problems diabetes, hypothyroidism, dyslipidemia, hypertension, osteoporosis addressed and managing longitudinal care of the patient due to serious managed problems diabetes, hypothyroidism, dyslipidemia, hypertension, osteoporosis     Follow up in about 6 months (around 10/19/2024) for TSH in 6 weeks .

## 2024-04-19 ENCOUNTER — OFFICE VISIT (OUTPATIENT)
Dept: ENDOCRINOLOGY | Facility: CLINIC | Age: 74
End: 2024-04-19
Payer: COMMERCIAL

## 2024-04-19 VITALS
BODY MASS INDEX: 32.55 KG/M2 | HEART RATE: 75 BPM | HEIGHT: 61 IN | OXYGEN SATURATION: 99 % | DIASTOLIC BLOOD PRESSURE: 74 MMHG | SYSTOLIC BLOOD PRESSURE: 138 MMHG | WEIGHT: 172.38 LBS

## 2024-04-19 DIAGNOSIS — M81.0 OSTEOPOROSIS, UNSPECIFIED OSTEOPOROSIS TYPE, UNSPECIFIED PATHOLOGICAL FRACTURE PRESENCE: ICD-10-CM

## 2024-04-19 DIAGNOSIS — I10 ESSENTIAL HYPERTENSION: ICD-10-CM

## 2024-04-19 DIAGNOSIS — E89.0 POSTABLATIVE HYPOTHYROIDISM: ICD-10-CM

## 2024-04-19 DIAGNOSIS — E78.2 MIXED HYPERLIPIDEMIA: ICD-10-CM

## 2024-04-19 DIAGNOSIS — E11.9 TYPE 2 DIABETES MELLITUS WITHOUT COMPLICATION, WITHOUT LONG-TERM CURRENT USE OF INSULIN: Primary | ICD-10-CM

## 2024-04-19 DIAGNOSIS — E55.9 VITAMIN D DEFICIENCY: ICD-10-CM

## 2024-04-19 PROCEDURE — 99214 OFFICE O/P EST MOD 30 MIN: CPT | Mod: S$GLB,,, | Performed by: NURSE PRACTITIONER

## 2024-04-19 PROCEDURE — 3060F POS MICROALBUMINURIA REV: CPT | Mod: CPTII,S$GLB,, | Performed by: NURSE PRACTITIONER

## 2024-04-19 PROCEDURE — 1101F PT FALLS ASSESS-DOCD LE1/YR: CPT | Mod: CPTII,S$GLB,, | Performed by: NURSE PRACTITIONER

## 2024-04-19 PROCEDURE — 3044F HG A1C LEVEL LT 7.0%: CPT | Mod: CPTII,S$GLB,, | Performed by: NURSE PRACTITIONER

## 2024-04-19 PROCEDURE — 99999 PR PBB SHADOW E&M-EST. PATIENT-LVL IV: CPT | Mod: PBBFAC,,, | Performed by: NURSE PRACTITIONER

## 2024-04-19 PROCEDURE — 3008F BODY MASS INDEX DOCD: CPT | Mod: CPTII,S$GLB,, | Performed by: NURSE PRACTITIONER

## 2024-04-19 PROCEDURE — 1159F MED LIST DOCD IN RCRD: CPT | Mod: CPTII,S$GLB,, | Performed by: NURSE PRACTITIONER

## 2024-04-19 PROCEDURE — 3066F NEPHROPATHY DOC TX: CPT | Mod: CPTII,S$GLB,, | Performed by: NURSE PRACTITIONER

## 2024-04-19 PROCEDURE — 3075F SYST BP GE 130 - 139MM HG: CPT | Mod: CPTII,S$GLB,, | Performed by: NURSE PRACTITIONER

## 2024-04-19 PROCEDURE — 1160F RVW MEDS BY RX/DR IN RCRD: CPT | Mod: CPTII,S$GLB,, | Performed by: NURSE PRACTITIONER

## 2024-04-19 PROCEDURE — 3288F FALL RISK ASSESSMENT DOCD: CPT | Mod: CPTII,S$GLB,, | Performed by: NURSE PRACTITIONER

## 2024-04-19 PROCEDURE — 3078F DIAST BP <80 MM HG: CPT | Mod: CPTII,S$GLB,, | Performed by: NURSE PRACTITIONER

## 2024-04-19 PROCEDURE — G2211 COMPLEX E/M VISIT ADD ON: HCPCS | Mod: S$GLB,,, | Performed by: NURSE PRACTITIONER

## 2024-04-19 PROCEDURE — 4010F ACE/ARB THERAPY RXD/TAKEN: CPT | Mod: CPTII,S$GLB,, | Performed by: NURSE PRACTITIONER

## 2024-04-19 PROCEDURE — 1126F AMNT PAIN NOTED NONE PRSNT: CPT | Mod: CPTII,S$GLB,, | Performed by: NURSE PRACTITIONER

## 2024-04-19 RX ORDER — SEMAGLUTIDE 0.68 MG/ML
0.5 INJECTION, SOLUTION SUBCUTANEOUS
Qty: 3 ML | Refills: 2 | Status: SHIPPED | OUTPATIENT
Start: 2024-04-19 | End: 2024-07-18

## 2024-04-19 RX ORDER — SEMAGLUTIDE 0.68 MG/ML
INJECTION, SOLUTION SUBCUTANEOUS
COMMUNITY
Start: 2024-03-29 | End: 2024-04-19 | Stop reason: SDUPTHER

## 2024-04-19 NOTE — PATIENT INSTRUCTIONS
Thyroid Hormone               Last labs were hypothyroid in April 2024   States she was started on Levothyroxine 88 mcg daily per PCP               Decrease Levothyroxine 88 mcg Monday-Saturday, skip Sunday              Check Tsh in 6 weeks   Discussed she needs less thyroid hormone because she lost weight!     Diabetes   A1c at goal   Check A1c every 6 months     Medication Changes   Continue Metformin 500 mg two tabs daily  Increase Ozempic to 0.5 mg weekly. If you have increased constipation, decrease Ozempic to 0.25 + 5 clicks.  If you have hypoglycemia blood sugar less than 70, then decrease Metformin to one tab daily  protein powder quest     Osteoporosis   RDA of vitamin D 1000 IU daily  RDA of calcium is 1494-0903 mg daily, preferably from food  Avoid falls      Continue Prolia   Bone density due in 2/2025     Side effects of Prolia reviewed, including risk of hypocalcemia, eczema/skin complications and possible increased risk of infections.  Also reviewed association with ONJ and rare atypical femur fractures.  Advise to see the dentist regularly, notify dentist of using this medication and avoid non-emergent dental implants and extractions.  Also advise to contact us if develops new, persistent thigh or groin pain.     Discussed ongoing concern and accumulating data describing rebound-associated vertebral fractures (RAVFs) after denosumab discontinuation.  We now know that discontinuation of denosumab is associated with up to 50% reduction in BMD that is only partially blocked by Reclast.  Current recommendations are to either continue indefinitely or to switch to oral bisp

## 2024-05-31 ENCOUNTER — LAB VISIT (OUTPATIENT)
Dept: LAB | Facility: HOSPITAL | Age: 74
End: 2024-05-31
Attending: NURSE PRACTITIONER
Payer: COMMERCIAL

## 2024-05-31 DIAGNOSIS — E11.9 TYPE 2 DIABETES MELLITUS WITHOUT COMPLICATION, WITHOUT LONG-TERM CURRENT USE OF INSULIN: ICD-10-CM

## 2024-05-31 LAB — TSH SERPL DL<=0.005 MIU/L-ACNC: 0.64 UIU/ML (ref 0.4–4)

## 2024-05-31 PROCEDURE — 84443 ASSAY THYROID STIM HORMONE: CPT | Performed by: NURSE PRACTITIONER

## 2024-05-31 PROCEDURE — 36415 COLL VENOUS BLD VENIPUNCTURE: CPT | Mod: PO | Performed by: NURSE PRACTITIONER

## 2024-06-03 ENCOUNTER — TELEPHONE (OUTPATIENT)
Dept: ENDOCRINOLOGY | Facility: CLINIC | Age: 74
End: 2024-06-03
Payer: COMMERCIAL

## 2024-06-06 DIAGNOSIS — E89.0 POSTABLATIVE HYPOTHYROIDISM: ICD-10-CM

## 2024-06-07 RX ORDER — LEVOTHYROXINE SODIUM 88 UG/1
TABLET ORAL
Qty: 72 TABLET | Refills: 0 | Status: SHIPPED | OUTPATIENT
Start: 2024-06-07

## 2024-08-21 DIAGNOSIS — I10 ESSENTIAL HYPERTENSION: ICD-10-CM

## 2024-08-21 DIAGNOSIS — E78.2 MIXED HYPERLIPIDEMIA: ICD-10-CM

## 2024-08-21 RX ORDER — ROSUVASTATIN CALCIUM 40 MG/1
40 TABLET, COATED ORAL NIGHTLY
Qty: 90 TABLET | Refills: 0 | Status: SHIPPED | OUTPATIENT
Start: 2024-08-21

## 2024-08-21 RX ORDER — LOSARTAN POTASSIUM 50 MG/1
50 TABLET ORAL
Qty: 90 TABLET | Refills: 1 | Status: SHIPPED | OUTPATIENT
Start: 2024-08-21

## 2024-08-21 NOTE — TELEPHONE ENCOUNTER
No care due was identified.  [facility] Embedded Care Due Messages. Reference number: [id number].   8/21/2024 6:13:49 AM CDT

## 2024-08-21 NOTE — TELEPHONE ENCOUNTER
Refill Decision Note   Hunter Cisneros  is requesting a refill authorization.  Brief Assessment and Rationale for Refill:  Approve     Medication Therapy Plan:        Comments:     Note composed:4:42 PM 08/21/2024

## 2024-08-23 DIAGNOSIS — E89.0 POSTABLATIVE HYPOTHYROIDISM: ICD-10-CM

## 2024-08-26 RX ORDER — LEVOTHYROXINE SODIUM 88 UG/1
TABLET ORAL
Qty: 72 TABLET | Refills: 0 | Status: SHIPPED | OUTPATIENT
Start: 2024-08-26

## 2024-09-25 DIAGNOSIS — E11.9 TYPE 2 DIABETES MELLITUS WITHOUT COMPLICATION: ICD-10-CM

## 2024-09-27 ENCOUNTER — OFFICE VISIT (OUTPATIENT)
Dept: FAMILY MEDICINE | Facility: CLINIC | Age: 74
End: 2024-09-27
Payer: COMMERCIAL

## 2024-09-27 ENCOUNTER — HOSPITAL ENCOUNTER (OUTPATIENT)
Dept: RADIOLOGY | Facility: HOSPITAL | Age: 74
Discharge: HOME | End: 2024-09-27
Attending: FAMILY MEDICINE
Payer: COMMERCIAL

## 2024-09-27 ENCOUNTER — TELEPHONE (OUTPATIENT)
Dept: FAMILY MEDICINE | Facility: CLINIC | Age: 74
End: 2024-09-27
Payer: COMMERCIAL

## 2024-09-27 VITALS
HEIGHT: 61 IN | SYSTOLIC BLOOD PRESSURE: 134 MMHG | DIASTOLIC BLOOD PRESSURE: 74 MMHG | WEIGHT: 160.94 LBS | OXYGEN SATURATION: 99 % | TEMPERATURE: 98 F | BODY MASS INDEX: 30.39 KG/M2 | HEART RATE: 74 BPM

## 2024-09-27 DIAGNOSIS — M81.8 OTHER OSTEOPOROSIS WITHOUT CURRENT PATHOLOGICAL FRACTURE: ICD-10-CM

## 2024-09-27 DIAGNOSIS — M25.562 CHRONIC PAIN OF BOTH KNEES: ICD-10-CM

## 2024-09-27 DIAGNOSIS — R29.898 WEAKNESS OF BOTH LEGS: Primary | ICD-10-CM

## 2024-09-27 DIAGNOSIS — M25.561 CHRONIC PAIN OF BOTH KNEES: ICD-10-CM

## 2024-09-27 DIAGNOSIS — M25.551 BILATERAL HIP PAIN: ICD-10-CM

## 2024-09-27 DIAGNOSIS — E11.22 DIABETES MELLITUS WITH STAGE 3 CHRONIC KIDNEY DISEASE: ICD-10-CM

## 2024-09-27 DIAGNOSIS — Z23 NEEDS FLU SHOT: ICD-10-CM

## 2024-09-27 DIAGNOSIS — G89.29 CHRONIC PAIN OF BOTH KNEES: ICD-10-CM

## 2024-09-27 DIAGNOSIS — M25.552 BILATERAL HIP PAIN: ICD-10-CM

## 2024-09-27 DIAGNOSIS — E03.9 HYPOTHYROIDISM, UNSPECIFIED TYPE: ICD-10-CM

## 2024-09-27 DIAGNOSIS — R29.898 WEAKNESS OF BOTH LEGS: ICD-10-CM

## 2024-09-27 DIAGNOSIS — R74.01 ELEVATED TRANSAMINASE LEVEL: ICD-10-CM

## 2024-09-27 DIAGNOSIS — N18.30 DIABETES MELLITUS WITH STAGE 3 CHRONIC KIDNEY DISEASE: ICD-10-CM

## 2024-09-27 DIAGNOSIS — N18.30 STAGE 3 CHRONIC KIDNEY DISEASE, UNSPECIFIED WHETHER STAGE 3A OR 3B CKD: ICD-10-CM

## 2024-09-27 DIAGNOSIS — I10 ESSENTIAL HYPERTENSION: Primary | ICD-10-CM

## 2024-09-27 PROCEDURE — 73562 X-RAY EXAM OF KNEE 3: CPT | Mod: 26,,, | Performed by: RADIOLOGY

## 2024-09-27 PROCEDURE — 72110 X-RAY EXAM L-2 SPINE 4/>VWS: CPT | Mod: TC,FY

## 2024-09-27 PROCEDURE — 72110 X-RAY EXAM L-2 SPINE 4/>VWS: CPT | Mod: 26,,, | Performed by: RADIOLOGY

## 2024-09-27 PROCEDURE — 73562 X-RAY EXAM OF KNEE 3: CPT | Mod: TC,50,FY

## 2024-09-27 PROCEDURE — 73521 X-RAY EXAM HIPS BI 2 VIEWS: CPT | Mod: TC,FY

## 2024-09-27 PROCEDURE — 73521 X-RAY EXAM HIPS BI 2 VIEWS: CPT | Mod: 26,,, | Performed by: RADIOLOGY

## 2024-09-27 PROCEDURE — 99999 PR PBB SHADOW E&M-EST. PATIENT-LVL IV: CPT | Mod: PBBFAC,,, | Performed by: FAMILY MEDICINE

## 2024-09-27 RX ORDER — SEMAGLUTIDE 0.68 MG/ML
0.5 INJECTION, SOLUTION SUBCUTANEOUS
COMMUNITY
Start: 2024-08-27

## 2024-09-27 RX ORDER — DICLOFENAC SODIUM 10 MG/G
2 GEL TOPICAL 4 TIMES DAILY PRN
Qty: 100 G | Refills: 11 | Status: SHIPPED | OUTPATIENT
Start: 2024-09-27

## 2024-09-27 NOTE — TELEPHONE ENCOUNTER
Please inform patient of the following     Reviewed x-rays:  your x-rays did show some fairly significant knee arthritis.  You do have mild hip arthritis and also some expected low back arthritis.  I will put in for physical therapy    Labs show controlled cholesterol, low but stable kidney function.  Some of your liver enzymes though were quite elevated unusually.  I would like to follow up some labs next week on this.  Your thyroid test, diabetes tests were all normal.  Just wanted to make sure you are not having any significant abdominal pain, nausea, vomiting or fevers    Please update labs next week    Orders Placed This Encounter   Procedures    Comprehensive Metabolic Panel    GAMMA GT    Hepatitis Panel, Acute    Ambulatory referral/consult to Physical/Occupational Therapy

## 2024-09-27 NOTE — PROGRESS NOTES
(Portions of this note were dictated using voice recognition software and may contain dictation related errors in spelling/grammar/syntax not found on text review)    CC:   Chief Complaint   Patient presents with    Follow-up    Leg Pain         HPI: 74 y.o. female    Hypertension on losartan 50 mg daily  amlodipine 5 mg daily  .  BP stable .Had lost significant amount of weight intentionally through healthy diet.    Was on hydrochlorothiazide but given decreasing renal function as below, she was advised to stay off this.  Blood pressure stable even off thiazide.  Does not take any NSAIDs.       Hyperlipidemia, Crestor 40 mg daily.      Diabetes with CKD 3, followed by endocrinology.  controlled on metformin 500 b.i.d. , Ozempic 0.5 mg weekly     Hypothyroidism on Synthroid, currently on 88 mcg daily.  Last TSH in May was normal   Osteoporosis, started on Prolia.  DEXA       Bilateral leg pain, feels weak when she sits up out of a chair.  Denies much pain in the back.  Does get some paresthesias in her thighs, discussed possibly meralgia paresthetica in the past given distribution.  She does feel some pain in her knees as well.  Feels a lot of stiffness in her legs when she is 1st getting up.  Try gabapentin but did not like how it made her feel so she stopped.  No imaging on file    Past Medical History:   Diagnosis Date    Cataract     CKD (chronic kidney disease) stage 3, GFR 30-59 ml/min 2014    Diabetes mellitus type II     GERD (gastroesophageal reflux disease)     Glaucoma (increased eye pressure)     HLD (hyperlipidemia)     HTN (hypertension)     Hypothyroidism     Morbid obesity 2014    Osteoporosis     Vitamin D deficiency 2014       Past Surgical History:   Procedure Laterality Date     SECTION, LOW TRANSVERSE         Family History   Problem Relation Name Age of Onset    Coronary artery disease Mother          premature, 57    No Known Problems Father      Cancer Sister       Breast cancer Sister  33    No Known Problems Maternal Aunt      Glaucoma Maternal Uncle      Diabetes Maternal Uncle      No Known Problems Paternal Aunt      No Known Problems Paternal Uncle      No Known Problems Maternal Grandmother      No Known Problems Maternal Grandfather      No Known Problems Paternal Grandmother      No Known Problems Paternal Grandfather      No Known Problems Brother      Diabetes Other      Hypertension Other      Thyroid disease Other      Amblyopia Neg Hx      Blindness Neg Hx      Cataracts Neg Hx      Macular degeneration Neg Hx      Retinal detachment Neg Hx      Strabismus Neg Hx      Stroke Neg Hx         Social History     Socioeconomic History    Marital status:    Tobacco Use    Smoking status: Former     Types: Cigarettes    Smokeless tobacco: Never    Tobacco comments:     occasionally   Substance and Sexual Activity    Alcohol use: No    Drug use: No       Lab Results   Component Value Date    WBC 8.93 03/11/2024    HGB 13.5 03/11/2024    HCT 41.6 03/11/2024    MCV 86 03/11/2024     03/11/2024    CHOL 78 (L) 03/11/2024    TRIG 92 03/11/2024    HDL 30 (L) 03/11/2024    ALT 44 04/09/2024    AST 35 04/09/2024    BILITOT 0.5 04/09/2024    ALKPHOS 77 04/09/2024     04/09/2024    K 4.2 04/09/2024     04/09/2024    CREATININE 1.2 04/09/2024    ESTGFRAFRICA 53.3 (A) 05/30/2022    EGFRNONAA 46.2 (A) 05/30/2022    EGFRNORACEVR 48.4 (A) 04/09/2024    CALCIUM 9.9 04/09/2024    ALBUMIN 3.4 (L) 04/09/2024    BUN 14 04/09/2024    CO2 23 04/09/2024    TSH 0.636 05/31/2024    INR 1.1 01/25/2005    HGBA1C 5.0 03/11/2024    MICALBCREAT 148.9 (H) 03/11/2024    LDLCALC 29.6 (L) 03/11/2024    GLU 80 04/09/2024    TDIYVHMI24KV 61 03/11/2024             Hemoglobin A1C (%)   Date Value   03/11/2024 5.0   08/30/2023 5.4   02/27/2023 5.4   10/17/2022 5.4   09/08/2022 5.3           eGFR if non African American (mL/min/1.73 m^2)   Date Value   05/30/2022 46.2 (A)  "  09/01/2021 35.3 (A)   05/26/2021 51.7 (A)   02/26/2020 47.4 (A)   11/16/2018 44.5 (A)   06/16/2017 48.4 (A)   03/07/2017 44.7 (A)   11/03/2016 48 (A)   06/02/2016 53.6 (A)   11/02/2015 >60     eGFR (mL/min/1.73 m^2)   Date Value   04/09/2024 48.4 (A)   03/11/2024 40 (A)   08/30/2023 44.2 (A)   02/27/2023 37.3 (A)   11/21/2022 37.3 (A)   10/17/2022 32.1 (A)   09/08/2022 44.2 (A)     Creatinine (mg/dL)   Date Value   04/09/2024 1.2   03/11/2024 1.4   08/30/2023 1.3   02/27/2023 1.5 (H)   11/21/2022 1.5 (H)   10/17/2022 1.7 (H)   09/08/2022 1.3   05/30/2022 1.2   09/01/2021 1.5 (H)   05/26/2021 1.1                   Vital signs reviewed  Vitals:    09/27/24 1020   BP: 134/74   BP Location: Left arm   Patient Position: Sitting   Pulse: 74   Temp: 97.7 °F (36.5 °C)   SpO2: 99%   Weight: 73 kg (160 lb 15 oz)   Height: 5' 1" (1.549 m)       Wt Readings from Last 10 Encounters:   09/27/24 73 kg (160 lb 15 oz)   04/19/24 78.2 kg (172 lb 6.4 oz)   03/11/24 79.9 kg (176 lb 2.4 oz)   10/02/23 90.1 kg (198 lb 10.2 oz)   09/07/23 90.1 kg (198 lb 10.2 oz)   08/30/23 89.4 kg (197 lb 1.5 oz)   03/29/23 84.7 kg (186 lb 11.7 oz)   03/06/23 84.7 kg (186 lb 11.7 oz)   11/28/22 84.3 kg (185 lb 13.6 oz)   08/25/22 84.4 kg (186 lb 1.1 oz)       PE:   APPEARANCE: Well nourished, well developed, in no acute distress.    HEAD: Normocephalic, atraumatic.  NECK: Supple with no cervical lymphadenopathy.  No carotid bruits.  No thyromegaly  CHEST: Good inspiratory effort. Lungs clear to auscultation with no wheezes or crackles.  CARDIOVASCULAR: Normal S1, S2. No rubs, murmurs, or gallops.  ABDOMEN: Bowel sounds normal. Not distended. Soft. No tenderness or masses. No organomegaly.  EXTREMITIES: No edema .  MSK/neuro: 2+ patellar and ankle reflexes bilaterally.  Negative straight leg raise bilaterally.  Pain over both greater trochanters.  Pain over sacroiliac joints bilaterally.  Normal internal and external rotation of the femurs bilaterally " with some minor pain over the knee.  Knee exam shows baseline valgus posture, no varus or valgus stress pain or laxity.  Negative Lachman's bilaterally.  Questionable Gautam's positive on the right side.  Positive crepitus left-greater-than-right to passive flexion and extension of the knee.  Motor:  4/5 hips, quads, hamstrings, foot dorsiflexion, 5/5 foot plantar flexion bilaterally.            IMPRESSION  1. Essential hypertension    2. Hypothyroidism, unspecified type    3. Stage 3 chronic kidney disease, unspecified whether stage 3a or 3b CKD    4. Other osteoporosis without current pathological fracture    5. Diabetes mellitus with stage 3 chronic kidney disease    6. Bilateral hip pain    7. Chronic pain of both knees    8. Weakness of both legs                  PLAN          Diabetes health maintenance:  -- advise regular and consistent glucose monitoring and medication compliance.  -- advise daily foot checks  -- advise yearly ophthalmologic exams    -- advise adequate dietary and exercise modification  -- advise regular dental visits  -- advise daily low-dose aspirin use if patient is not on other anticoagulants and there are no other contraindications.  -- Medication management:  Continue metformin 500 b.i.d. Ozempic 0.5 mg weekly (followed by endocrinology)    Dyslipidemia:  Continue Crestor    Hypothyroidism:  Continue Synthroid 88 mcg daily.  Trend TSH    Hypertension:   amlodipine 5 mg daily.   losartan   50 mg daily    Osteoporosis:  Prolia q.6 months.    CKD: Continue to monitor labs.  BP stable.  Diabetes has been well controlled.     Labs below    Leg pain and weakness, discussed differential diagnoses including hip arthritis, knee arthritis, radiating symptoms from lumbar spine.  She has significant muscle weakness bilaterally.  Will x-ray as below.  Voltaren gel topically for specific painful areas.  After x-rays are back and reviewed, will likely proceed with physical therapy consultation            Orders Placed This Encounter   Procedures    X-Ray Hips Bilateral 2 View Incl AP Pelvis    X-Ray Knee AP LAT with [place] Bilat    X-Ray Lumbar Spine 5 View    CBC Auto Differential    Comprehensive Metabolic Panel    Lipid Panel    TSH    Hemoglobin A1C       Medications Ordered This Encounter   Medications    diclofenac sodium (VOLTAREN) 1 % Gel     Sig: Apply 2 g topically 4 (four) times daily as needed (pain).     Dispense:  100 g     Refill:  11    influenza (adjuvanted) (Fluad) 45 mcg/0.5 mL IM vaccine (> or = 66 yo) 0.5 mL             HEALTH SCREENINGS  Immunization:  Tdap in 2015   Pneumovax : 9/24/12, 2018  Flu: utd  Pcv:  09/22/2017  CoVID vaccine booster    up-to-date  Zoster up-to-date     Shingles vaccine up-to-date  Mammogram 04/02/2024  Pap:2016     Colonoscopy 2008. Multiple small hyperplastic polyps removed. Repeat in 7-10 years, Fit on 09/12/2023    DEXA scan:  02/26/2020.  Osteopenia:  L-spine T-score is-1.6.  Left Femoral neck T-score is-1.4.  Right femoral neck T-score -1.5,  DEXA scan 02/27/2023.  Lumbar T-score is-3.1 area left femoral neck T-score is-2.7.

## 2024-09-30 ENCOUNTER — TELEPHONE (OUTPATIENT)
Dept: FAMILY MEDICINE | Facility: CLINIC | Age: 74
End: 2024-09-30
Payer: COMMERCIAL

## 2024-09-30 NOTE — TELEPHONE ENCOUNTER
Please tell patient that Her urine did show that she does have protein in the urine which we usually can see with chronic kidney disease.  Her kidney test  blood test was roughly stable from where she normally is with her kidney readings.  She should just make sure that she is staying well hydrated, blood pressures are staying less than 140/90, and she is avoiding any anti-inflammatory medications like Advil, Aleve, ibuprofen

## 2024-10-02 ENCOUNTER — CLINICAL SUPPORT (OUTPATIENT)
Dept: REHABILITATION | Facility: HOSPITAL | Age: 74
End: 2024-10-02
Attending: FAMILY MEDICINE
Payer: COMMERCIAL

## 2024-10-02 DIAGNOSIS — R26.9 GAIT ABNORMALITY: ICD-10-CM

## 2024-10-02 DIAGNOSIS — R26.89 DECREASED FUNCTIONAL MOBILITY: ICD-10-CM

## 2024-10-02 DIAGNOSIS — R53.1 DECREASED STRENGTH: Primary | ICD-10-CM

## 2024-10-02 DIAGNOSIS — M62.89 MUSCLE TIGHTNESS: ICD-10-CM

## 2024-10-02 DIAGNOSIS — M25.552 BILATERAL HIP PAIN: ICD-10-CM

## 2024-10-02 DIAGNOSIS — G89.29 CHRONIC PAIN OF BOTH KNEES: ICD-10-CM

## 2024-10-02 DIAGNOSIS — M25.60 DECREASED RANGE OF MOTION: ICD-10-CM

## 2024-10-02 DIAGNOSIS — R29.898 WEAKNESS OF BOTH LEGS: ICD-10-CM

## 2024-10-02 DIAGNOSIS — M25.561 CHRONIC PAIN OF BOTH KNEES: ICD-10-CM

## 2024-10-02 DIAGNOSIS — M25.551 BILATERAL HIP PAIN: ICD-10-CM

## 2024-10-02 DIAGNOSIS — M25.562 CHRONIC PAIN OF BOTH KNEES: ICD-10-CM

## 2024-10-02 PROCEDURE — 97110 THERAPEUTIC EXERCISES: CPT | Mod: PN

## 2024-10-02 PROCEDURE — 97161 PT EVAL LOW COMPLEX 20 MIN: CPT | Mod: PN

## 2024-10-02 NOTE — TELEPHONE ENCOUNTER
Please tell patient that Her urine did show that she does have protein in the urine which we usually can see with chronic kidney disease.  Her kidney test  blood test was roughly stable from where she normally is with her kidney readings.  She should just make sure that she is staying well hydrated, blood pressures are staying less than 140/90, and she is avoiding any anti-inflammatory medications like Advil, Aleve, ibuprofen   Patient was inform of everything above. Patient voices understanding.

## 2024-10-02 NOTE — PLAN OF CARE
[facility]  Physical Therapy Initial Evaluation    Name: Hunter Cisneros  Clinic Number: [id number]    Therapy Diagnosis:   Encounter Diagnoses   Name Primary?    Weakness of both legs     Chronic pain of both knees     Bilateral hip pain     Decreased strength Yes    Muscle tightness     Gait abnormality     Decreased functional mobility     Decreased range of motion      Physician: Lenny Javier MD   Physician Orders: PT Eval and Treat  Medical Diagnosis from Referral: R29.898 (ICD-10-CM) - Weakness of both legs M25.561,M25.562,G89.29 (ICD-10-CM) - Chronic pain of both knees M25.551,M25.552 (ICD-10-CM) - Bilateral hip pain  Evaluation Date: 10/2/2024  Authorization Period Expiration: 09/27/2025  Plan of Care Expiration: 11/30/2024    Progress Update: 11/2/2024  FOTO: 1 / 3   Visit # / Visits authorized: 1 / 1       PRECAUTIONS: Standard Precautions and Safety/fall precautions     Time In: 1300  Time Out: 1350  Total Appointment Time (timed & untimed codes): 50 minutes    SUBJECTIVE     Chief complaint:   P1:  Intermittent central low back pain    P2:  Intermittent shooting pain down B legs to feet     P3:  Intermittent R anterior thigh pain    P4:  Intermittent B anterior knee pain        History of current condition:  Pain started about 3-4 years ago.   Denies specific incident.   Pain intensity and frequency has gotten worse since onset.   Denies numbness/tingling.  Stats that she gets shooting pains down both legs to her feet.   States that she feels like she is always off balance but has not fallen for the last 2 years.       Previous episode:  none    Aggravating Factors:  P1, P2, P3, P4:  prolonged walking > 10 mins, sit to stand  Easing Factors:  P1-P4:  voltaren    Imaging:      FINDINGS:  Lumbar spine vertebral body heights appear maintained with multilevel discogenic change.  Variable disc space narrowing and lower facet DJD.  No evidence for lytic or blastic lesion.   Right upper quadrant 3.0 cm calcification suggesting gallstone.  Modest aortic atherosclerosis.     Impression:     As above.        Electronically signed by:Cullen Song  Date:                                            09/27/2024  Time:                                           15:10    FINDINGS:  Femoral heads are appropriately seated with mild joint space narrowing.  Degree of narrowing with mild sclerosis at the pubic symphysis.  No acute fracture, dislocation, or osseous destruction.     Impression:     As above.        Electronically signed by:Cullen Song  Date:                                            09/27/2024  Time:                                           15:15    FINDINGS:  Bilateral knee degenerative change and scattered marginal osteophytes with significant narrowing at the medial compartments, left greater than right.  No acute fracture, dislocation, or osseous destruction.  No large suprapatellar effusion.     Impression:     As above.        Electronically signed by:Cullen Song  Date:                                            09/27/2024  Time:                                           14:58    Prior Therapy: N/A  Social History: Pt lives with their spouse  Occupation: Pt is retired.  Prior Level of Function: Independent with all ADLs  Current Level of Function: 50% of PLOF    Pain:  Current 7 /10, worst 9 /10, best 5 /10   Description: Aching and Dull    Pts goals: Pt reported goal is to be able to walk without pain.    _______________________________________________________  Medical History:   Past Medical History:   Diagnosis Date    Cataract     CKD (chronic kidney disease) stage 3, GFR 30-59 ml/min 03/21/2014    Diabetes mellitus type II     GERD (gastroesophageal reflux disease)     Glaucoma (increased eye pressure)     HLD (hyperlipidemia)     HTN (hypertension)     Hypothyroidism     Morbid obesity 03/21/2014    Osteoporosis     Vitamin D deficiency 03/21/2014        Surgical History:   Hunter Cisneros  has a past surgical history that includes  section, low transverse.    Medications:   Hunter has a current medication list which includes the following prescription(s): amlodipine, aspirin, blood sugar diagnostic, blood-glucose meter, cholecalciferol (vitamin d3), diclofenac sodium, lancets, latanoprost, latanoprost, levothyroxine, losartan, metformin, omega-3 fatty acids, ozempic, rosuvastatin, timolol maleate 0.5%, timolol maleate 0.5%, and wheat dextrin.    Allergies:   Review of patient's allergies indicates:   Allergen Reactions    Lisinopril      cough        OBJECTIVE   (x = not tested due to pain and/or inability to obtain test position)    RANGE OF MOTION:    Lumbar AROM/PROM Right  (spine)  10/2/2024 Left    10/2/2024 Goal   Lumbar Flexion (60) wfl --- 55     Lumbar Extension (30) 15 c pain --- 30     Lumbar Side Bending (25) 14 16 20     Lumbar Rotation wfl wfl 45         Hip AROM/PROM Right  10/2/2024 Left  10/2/2024 Goal   Hip Flexion (120) 100 105 115     Hip IR (45) wfl wfl 40     Hip ER (45) wfl wfl 40         Knee AROM/PROM Right  10/2/2024 Left  10/2/2024 Goal   Hyper - Zero - Flexion wfl wfl 0-0-135         STRENGTH:    L/E MMT Right  10/2/2024 Goal   Hip Flexion  2/5 5/5 B    Hip Extension  2/5 5/5 B   Hip Abduction  2/5 5/5 B   Hip IR 2/5 5/5 B   Hip ER 2/5 5/5 B   Knee Flexion 3+/5 5/5 B   Knee Extension 3+/5 5/5 B   Ankle DF 3+/5 5/5 B   Ankle PF 3+/5 5/5 B   Ankle Inversion 3+/5 5/5 B   Ankle Eversion 3+/5 5/5 B   Big Toe Extension 3+/5 5/5 B     L/E MMT Left  10/2/2024 Goal   Hip Flexion  2/5 5/5 B    Hip Extension  2/5 5/5 B   Hip Abduction  2/5 5/5 B   Hip IR 2/5 5/5 B   Hip ER 2/5 5/5 B   Knee Flexion 3+/5 5/5 B   Knee Extension 3+/5 5/5 B   Ankle DF 3+/5 5/5 B   Ankle PF 3+/5 5/5 B   Ankle Inversion 3+/5 5/5 B   Ankle Eversion 3+/5 5/5 B   Big Toe Extension 3+/5 5/5 B       MUSCLE LENGTH:     Muscle Tested  Right  10/2/2024 Left  "  10/2/2024 Goal   Hip Flexors  decreased decreased Normal B   Quadriceps normal normal Normal B   Hamstrings  normal normal Normal B   Piriformis  normal normal Normal B   Gastrocnemius  decreased decreased Normal B   Soleus  decreased decreased Normal B       SPECIAL TESTS:     Right  (spine)  10/2/2024 Left Goal   SLR Negative Negative Negative    Crossover SLR Negative Negative Negative    Slump Negative Negative Negative    90/90 Negative Negative Negative   Jeyson Positive Positive Negative   Standing forward flexion test Negative Negative Negative   Sacral thrust Negative Negative Negative   SIJ Distraction Negative Negative Negative   SIJ Compression Negative Negative Negative       Sensation:  Sensation is intact to light touch    Palpation:  TTP from L3-L5 spinous processes.    Posture:  Pt presents with postural abnormalities which include: forward head and rounded shoulders    Gait Analysis: The patient ambulated with the following assistive device: straight cane; the pt presents with the following gait abnormalities: decreased step length, malcom, and arm swing; increased forward flexed posture, trunk sway     Movement Analysis:   Functional Test  Outcome   Double Leg Squat 1/4 squat with pain   Single Leg Step Down NT           TREATMENT   Total Treatment time separate from Evaluation: (10) minutes    Ladean received therapeutic exercises to develop strength, endurance, ROM, flexibility, and posture for (10) minutes including: x = exercises performed     TherEx 10/2/2024    Seated marches x 3x10   LAQs x 3x10   Seated heel raises x 3x10          Plan for Next Visit:     Nustep x 10 mins     Seated marches 2x10  Seated heel raises 2x10  LAQs  2x10  Seated Physioball rollout  3 x 10 reps      SAQs  3x10  Bridges 3 x 10 reps     Clams green  3 x 10 reps    Double Knee To Chest with Physioball 3 x 10 reps   Lower trunk rotation x 30 reps bilateral   Hip adduction with ball 3 x 10 reps      Step ups 6"  3 " x 10 reps bilateral   Hip 3 way 2 x 10 reps Bilateral          Home Exercises and Patient Education Provided    Education/Self-Care provided:  Patient educated on the impairments noted above and the effects of physical therapy intervention to improve overall condition and quality of life.   Patient was educated on all the above exercise prior/during/after for proper posture, positioning, and execution for safe performance with home exercise program.     Written Home Exercises Provided: yes. Prefers: Electronic Copies  Exercises were reviewed and Hunter was able to demonstrate them prior to the end of the session.  Hunter demonstrated good understanding of the education provided.     See EMR under Patient Instructions for exercises provided during therapy sessions.    ASSESSMENT   Hunter is a 74 y.o. female referred to outpatient Physical Therapy with a medical diagnosis of R29.898 (ICD-10-CM) - Weakness of both legs M25.561,M25.562,G89.29 (ICD-10-CM) - Chronic pain of both knees M25.551,M25.552 (ICD-10-CM) - Bilateral hip pain. Hunter presents with clinical signs and symptoms that support this diagnosis with decreased Lumbar ROM, decreased lower extremity strength, Lumbar joint(s) hypomobility, lower extremity neural tension, and impaired functional mobility. Radicular symptoms are present from the lumbar spine into the R leg to foot..    The above impairments will be addressed through manual therapy techniques, therapeutic exercises, functional training, and modalities as necessary. Patient was treated and educated on exercises for home program, progression of therapy, and benefits of therapy to achieve full functional mobility. Patient will benefit from skilled physical therapy to meet short and long term goals and return to prior level of function.    Pt prognosis is Good.   Pt will benefit from skilled outpatient Physical Therapy to address the deficits stated above and in the chart below, provide pt/family  "education, and to maximize pt's level of independence.     Plan of care discussed with patient: Yes  Pt's spiritual, cultural and educational needs considered and patient is agreeable to the plan of care and goals as stated below:     Anticipated Barriers for therapy: none    Medical Necessity is demonstrated by the following:   History  Co-morbidities and personal factors that may impact the plan of care Co-morbidities:   advanced age    Personal Factors:   no deficits     low   Examination  Body Structures and Functions, activity limitations and participation restrictions that may impact the plan of care Body Regions:   back  lower extremities    Body Systems:    gross symmetry  ROM  strength  gross coordinated movement  balance  gait  motor control  motor learning    Participation Restrictions:   See above in "Current Level of Function"     Activity limitations:   Learning and applying knowledge  no deficits    General Tasks and Commands  no deficits    Communication  no deficits    Mobility  no deficits    Self care  no deficits    Domestic Life  no deficits    Interactions/Relationships  no deficits    Life Areas  no deficits    Community and Social Life  community life  recreation and leisure  no deficits         low   Clinical Presentation stable and uncomplicated low   Decision Making/ Complexity Score: low       GOALS:  SHORT TERM GOALS: 4 weeks 10/2/2024   Recent signs and systems trend is improving in order to progress towards LTG's.    Patient will be independent with HEP in order to further progress and return to maximal function.    Pain rating at Worst: 5/10 in order to progress towards increased independence with activity.    Patient will be able to correct postural deviations in sitting and standing, to decrease pain and promote postural awareness for injury prevention.       LONG TERM GOALS: 8 weeks 10/2/2024   Patient will return to normal ADL, recreational, and work related activities with less " pain and limitation.     Patient will improve AROM to stated goals in order to return to maximal functional potential.     Patient will improve Strength to stated goals of appropriate musculature in order to improve functional independence.     Pain Rating at Best: 1/10 to improve Quality of Life.     Patient will meet predicted functional outcome (FOTO) score: 80% to increase self-worth & perceived functional ability.    Patient will have met/partially met personal goal of being able to walk without pain.          PLAN   Plan of care Certification: 10/2/2024 to 11/30/2024    Outpatient Physical Therapy 2 times weekly for 6 weeks to include any combination of the following interventions: virtual visits, dry needling, modalities, electrical stimulation (IFC, Pre-Mod, Attended with Functional Dry Needling), Manual Therapy, Moist Heat/ Ice, Neuromuscular Re-ed, Patient Education, Self Care, Therapeutic Exercise, Functional Training, and Therapeutic Activites     Thank you for this referral.    These services are reasonable and necessary for the conditions set forth above while under my care.    Sathya Rivera, PT, DPT

## 2024-10-04 ENCOUNTER — INFUSION (OUTPATIENT)
Dept: INFUSION THERAPY | Facility: HOSPITAL | Age: 74
End: 2024-10-04
Attending: INTERNAL MEDICINE
Payer: COMMERCIAL

## 2024-10-04 VITALS
OXYGEN SATURATION: 98 % | DIASTOLIC BLOOD PRESSURE: 58 MMHG | TEMPERATURE: 99 F | SYSTOLIC BLOOD PRESSURE: 126 MMHG | RESPIRATION RATE: 16 BRPM | HEART RATE: 62 BPM

## 2024-10-04 DIAGNOSIS — M81.0 OSTEOPOROSIS, UNSPECIFIED OSTEOPOROSIS TYPE, UNSPECIFIED PATHOLOGICAL FRACTURE PRESENCE: Primary | ICD-10-CM

## 2024-10-04 PROCEDURE — 63600175 PHARM REV CODE 636 W HCPCS: Mod: JZ,JG | Performed by: FAMILY MEDICINE

## 2024-10-04 PROCEDURE — 96372 THER/PROPH/DIAG INJ SC/IM: CPT

## 2024-10-04 RX ADMIN — DENOSUMAB 60 MG: 60 INJECTION SUBCUTANEOUS at 10:10

## 2024-10-04 NOTE — PLAN OF CARE
Patient tolerated Prolia injection to right arm well. No s/s adverse reaction. Next appt reviewed with patient. Patient ambulated out of clinic in NAD.

## 2024-10-07 ENCOUNTER — LAB VISIT (OUTPATIENT)
Dept: LAB | Facility: HOSPITAL | Age: 74
End: 2024-10-07
Attending: FAMILY MEDICINE
Payer: COMMERCIAL

## 2024-10-07 ENCOUNTER — CLINICAL SUPPORT (OUTPATIENT)
Dept: REHABILITATION | Facility: HOSPITAL | Age: 74
End: 2024-10-07
Payer: COMMERCIAL

## 2024-10-07 DIAGNOSIS — R26.9 GAIT ABNORMALITY: ICD-10-CM

## 2024-10-07 DIAGNOSIS — R53.1 DECREASED STRENGTH: Primary | ICD-10-CM

## 2024-10-07 DIAGNOSIS — R26.89 DECREASED FUNCTIONAL MOBILITY: ICD-10-CM

## 2024-10-07 DIAGNOSIS — R74.01 ELEVATED TRANSAMINASE LEVEL: ICD-10-CM

## 2024-10-07 DIAGNOSIS — M62.89 MUSCLE TIGHTNESS: ICD-10-CM

## 2024-10-07 LAB
ALBUMIN SERPL BCP-MCNC: 2.9 G/DL (ref 3.5–5.2)
ALP SERPL-CCNC: 82 U/L (ref 55–135)
ALT SERPL W/O P-5'-P-CCNC: 222 U/L (ref 10–44)
ANION GAP SERPL CALC-SCNC: 9 MMOL/L (ref 8–16)
AST SERPL-CCNC: 158 U/L (ref 10–40)
BILIRUB SERPL-MCNC: 0.6 MG/DL (ref 0.1–1)
BUN SERPL-MCNC: 12 MG/DL (ref 8–23)
CALCIUM SERPL-MCNC: 9.9 MG/DL (ref 8.7–10.5)
CHLORIDE SERPL-SCNC: 108 MMOL/L (ref 95–110)
CO2 SERPL-SCNC: 26 MMOL/L (ref 23–29)
CREAT SERPL-MCNC: 1.1 MG/DL (ref 0.5–1.4)
EST. GFR  (NO RACE VARIABLE): 53.4 ML/MIN/1.73 M^2
GGT SERPL-CCNC: 70 U/L (ref 8–55)
GLUCOSE SERPL-MCNC: 76 MG/DL (ref 70–110)
HAV IGM SERPL QL IA: NORMAL
HBV CORE IGM SERPL QL IA: NORMAL
HBV SURFACE AG SERPL QL IA: NORMAL
HCV AB SERPL QL IA: NORMAL
POTASSIUM SERPL-SCNC: 3.5 MMOL/L (ref 3.5–5.1)
PROT SERPL-MCNC: 6.1 G/DL (ref 6–8.4)
SODIUM SERPL-SCNC: 143 MMOL/L (ref 136–145)

## 2024-10-07 PROCEDURE — 97110 THERAPEUTIC EXERCISES: CPT | Mod: KX,PN,CQ

## 2024-10-07 PROCEDURE — 80053 COMPREHEN METABOLIC PANEL: CPT | Performed by: FAMILY MEDICINE

## 2024-10-07 PROCEDURE — 82977 ASSAY OF GGT: CPT | Performed by: FAMILY MEDICINE

## 2024-10-07 PROCEDURE — 80074 ACUTE HEPATITIS PANEL: CPT | Performed by: FAMILY MEDICINE

## 2024-10-07 PROCEDURE — 36415 COLL VENOUS BLD VENIPUNCTURE: CPT | Mod: PO | Performed by: FAMILY MEDICINE

## 2024-10-07 PROCEDURE — 97112 NEUROMUSCULAR REEDUCATION: CPT | Mod: KX,PN,CQ

## 2024-10-07 NOTE — PROGRESS NOTES
[facility]   Physical Therapy Treatment Note     Name: Hunter Cisneros  Clinic Number: [id number]    Therapy Diagnosis:   Encounter Diagnoses   Name Primary?    Decreased strength Yes    Muscle tightness     Gait abnormality     Decreased functional mobility      Physician: Lenny Javier MD    Visit Date: 10/7/2024    Physician: Lenny Javier MD             Physician Orders: PT Eval and Treat  Medical Diagnosis from Referral: R29.898 (ICD-10-CM) - Weakness of both legs M25.561,M25.562,G89.29 (ICD-10-CM) - Chronic pain of both knees M25.551,M25.552 (ICD-10-CM) - Bilateral hip pain  Evaluation Date: 10/2/2024  Authorization Period Expiration: 09/27/2025  Plan of Care Expiration: 11/30/2024                          Progress Update: 11/2/2024             FOTO: 1 / 3   Visit # / Visits authorized: 1 / 12   ([id number])                       Precautions: S: Standard Precautions and Safety/fall precautions    Time In: 0852  Time Out: 0950  Total Billable Time: 58 minutes      SUBJECTIVE     Pt reports: she was a little stiff this morning, but rubbed some cream on and she is better now.  She was compliant with home exercise program.  Response to previous treatment: first visit after eval  Functional change: ongoing    Pain: 0/10  Location: hips/LE's      OBJECTIVE     P1:  Intermittent central low back pain    P2:  Intermittent shooting pain down B legs to feet     P3:  Intermittent R anterior thigh pain    P4:  Intermittent B anterior knee pain      Objective Measures updated at progress report unless specified.     Treatment     Hunter received the treatments listed below:      Therapeutic exercises to develop strength and ROM for 12 minutes including:    Nustep x 7 mins     Seated Physioball rollout  2 x 10    Neuromuscular re-education activities to improve: Balance, Coordination, Kinesthetic, Sense, and Proprioception for 46 minutes:    Seated marches 2x10 Bilateral   Seated heel  "raises/toe raises 2x10 Bilateral   LAQs  2x10 Bilateral  Neural glide, df/pf 2 x 10 Bilateral -with stool and heel hanging off edge of stool    SAQs  3x10  NP   Bridges 2 x 10     Clams RTB  3 x 10   -   sitting  Double Knee To Chest with Physioball 3 x 10    Lower trunk rotation x 10 Bilateral   Hip adduction with ball 2 x 10  -  sitting    Therapeutic activities to improve functional performance for 0  minutes, including:    Step ups 6"  3 x 10 reps bilateral   Hip 3 way 2 x 10 reps Bilateral       Patient Education and Home Exercises     Home Exercises Provided and Patient Education Provided     Education provided:   - cont HOME EXERCISE PROGRAM     Written Home Exercises Provided: Patient instructed to cont prior HEP. Exercises were reviewed and Hunter was able to demonstrate them prior to the end of the session.  Hunter demonstrated good  understanding of the education provided. See EMR under Patient Instructions for exercises provided during therapy sessions    ASSESSMENT     Hunter presents with decreased Bilateral LE and hip strength.  She ambulates with decreased step length, slow malcom.  Decreased pain after neural glides were performed.  Assistance needed with LE placement on Nustep and stool.  Continued strengthening to decrease fall risk, improve functional activities.    Hunter Is progressing well towards her goals.   Pt prognosis is Good.     Pt will continue to benefit from skilled outpatient physical therapy to address the deficits listed in the problem list box on initial evaluation, provide pt/family education and to maximize pt's level of independence in the home and community environment.     Pt's spiritual, cultural and educational needs considered and pt agreeable to plan of care and goals.     Anticipated barriers to physical therapy: none       GOALS:  SHORT TERM GOALS: 4 weeks 10/7/2024   Recent signs and systems trend is improving in order to progress towards LTG's.  ongoing   Patient " will be independent with HEP in order to further progress and return to maximal function.  ongoing   Pain rating at Worst: 5/10 in order to progress towards increased independence with activity.  ongoing   Patient will be able to correct postural deviations in sitting and standing, to decrease pain and promote postural awareness for injury prevention.   ongoing      LONG TERM GOALS: 8 weeks 10/7/2024   Patient will return to normal ADL, recreational, and work related activities with less pain and limitation.   ongoing   Patient will improve AROM to stated goals in order to return to maximal functional potential.   ongoing   Patient will improve Strength to stated goals of appropriate musculature in order to improve functional independence.   ongoing   Pain Rating at Best: 1/10 to improve Quality of Life.   ongoing   Patient will meet predicted functional outcome (FOTO) score: 80% to increase self-worth & perceived functional ability.  ongoing   Patient will have met/partially met personal goal of being able to walk without pain.  ongoing       PLAN     Cont per Plan of Care, gait, balance, strength    Tabitha Ward, PTA

## 2024-10-08 ENCOUNTER — TELEPHONE (OUTPATIENT)
Dept: FAMILY MEDICINE | Facility: CLINIC | Age: 74
End: 2024-10-08
Payer: COMMERCIAL

## 2024-10-08 DIAGNOSIS — R74.01 ELEVATED TRANSAMINASE LEVEL: Primary | ICD-10-CM

## 2024-10-08 DIAGNOSIS — R74.8 HIGH GAMMA GLUTAMYL TRANSFERASE (GGT): ICD-10-CM

## 2024-10-08 NOTE — TELEPHONE ENCOUNTER
Has bw on 10/22 and will call back to book ultrasound tomorrow or Thursday when she know the rest of her PT schedule

## 2024-10-08 NOTE — TELEPHONE ENCOUNTER
Please advise that liver enzymes are more elevated, we need to do an ultrasound, and I will also order additional testing in the next 2 weeks.  If levels do not come down and ultrasound does not show anything obvious, may need to set up referral to hepatology    I would also like her to hold off on her Crestor (rosuvastatin) for now, if things improve we can always try to reinstitute it.  Also make sure she is not taking a lot of Tylenol or ibuprofen    Orders Placed This Encounter   Procedures    US Abdomen Limited_Liver    Comprehensive Metabolic Panel    Ferritin    Iron and TIBC    CBC Auto Differential    ROBERTO CARLOS    ANTI-SMOOTH MUSCLE ANTIBODY    ANTIMITOCHONDRIAL ANTIBODY    GAMMA GT

## 2024-10-10 ENCOUNTER — DOCUMENTATION ONLY (OUTPATIENT)
Dept: REHABILITATION | Facility: HOSPITAL | Age: 74
End: 2024-10-10
Payer: COMMERCIAL

## 2024-10-10 DIAGNOSIS — E11.9 TYPE 2 DIABETES MELLITUS WITHOUT COMPLICATION, WITHOUT LONG-TERM CURRENT USE OF INSULIN: Primary | ICD-10-CM

## 2024-10-10 RX ORDER — SEMAGLUTIDE 0.68 MG/ML
INJECTION, SOLUTION SUBCUTANEOUS
Qty: 3 ML | Refills: 0 | Status: SHIPPED | OUTPATIENT
Start: 2024-10-10

## 2024-10-10 NOTE — PROGRESS NOTES
PT/PTA met face to face to discuss pt's treatment plan and progress towards established goals. Pt will be seen by a physical therapist minimally every 6th visit or every 30 days.    Tabitha Ward PTA

## 2024-10-11 ENCOUNTER — CLINICAL SUPPORT (OUTPATIENT)
Dept: REHABILITATION | Facility: HOSPITAL | Age: 74
End: 2024-10-11
Payer: COMMERCIAL

## 2024-10-11 DIAGNOSIS — R26.9 GAIT ABNORMALITY: ICD-10-CM

## 2024-10-11 DIAGNOSIS — R26.89 DECREASED FUNCTIONAL MOBILITY: ICD-10-CM

## 2024-10-11 DIAGNOSIS — R53.1 DECREASED STRENGTH: Primary | ICD-10-CM

## 2024-10-11 DIAGNOSIS — M62.89 MUSCLE TIGHTNESS: ICD-10-CM

## 2024-10-11 PROCEDURE — 97112 NEUROMUSCULAR REEDUCATION: CPT | Mod: KX,PN,CQ

## 2024-10-11 PROCEDURE — 97110 THERAPEUTIC EXERCISES: CPT | Mod: KX,PN,CQ

## 2024-10-11 NOTE — PROGRESS NOTES
[facility]   Physical Therapy Treatment Note     Name: Hunter Cisneros  Clinic Number: [id number]    Therapy Diagnosis:   Encounter Diagnoses   Name Primary?    Decreased strength Yes    Muscle tightness     Gait abnormality     Decreased functional mobility      Physician: Lenny Javier MD    Visit Date: 10/11/2024    Physician: Lenny Javier MD             Physician Orders: PT Eval and Treat  Medical Diagnosis from Referral: R29.898 (ICD-10-CM) - Weakness of both legs M25.561,M25.562,G89.29 (ICD-10-CM) - Chronic pain of both knees M25.551,M25.552 (ICD-10-CM) - Bilateral hip pain  Evaluation Date: 10/2/2024  Authorization Period Expiration: 09/27/2025  Plan of Care Expiration: 11/30/2024                          Progress Update: 11/2/2024             FOTO: 1 / 3   Visit # / Visits authorized: 2 / 12   ([id number])                       Precautions: Standard Precautions and Safety/fall precautions    Time In: 0846  Time Out: 0942  Total Billable Time: 56 minutes      SUBJECTIVE     Pt reports: stiffness in leg when got up this morning, better now  She was compliant with home exercise program.  Response to previous treatment: good  Functional change: ongoing    Pain: 6 / 10  Location: hips/LE's      OBJECTIVE     P1:  Intermittent central low back pain    P2:  Intermittent shooting pain down B legs to feet     P3:  Intermittent R anterior thigh pain    P4:  Intermittent B anterior knee pain      Objective Measures updated at progress report unless specified.     Treatment     Hunter received the treatments listed below:      Therapeutic exercises to develop strength and ROM for 10 minutes including:    Nustep x 10 mins     Seated Physioball rollout  2 x 10  NP    Neuromuscular re-education activities to improve: Balance, Coordination, Kinesthetic, Sense, and Proprioception for 46 minutes:    Seated marches 1# 2x10 Bilateral   Seated heel raises/toe raises 2x10 Bilateral   LAQs  "1#  2x10 Bilateral  Neural glide, df/pf 2 x 10 Bilateral -with stool and heel hanging off edge of stool    SAQs  2x10    Bridges 2 x 10     Clams RTB  2 x 10    Double Knee To Chest with Physioball 2 x 10    Lower trunk rotation x 10 Bilateral   Hip adduction with ball 2 x 10     Therapeutic activities to improve functional performance for 0  minutes, including:    Step ups 6"  3 x 10 reps bilateral   Hip 3 way 2 x 10 reps Bilateral       Patient Education and Home Exercises     Home Exercises Provided and Patient Education Provided     Education provided:   - cont HOME EXERCISE PROGRAM     Written Home Exercises Provided: Patient instructed to cont prior HEP. Exercises were reviewed and Hunter was able to demonstrate them prior to the end of the session.  Hunter demonstrated good  understanding of the education provided. See EMR under Patient Instructions for exercises provided during therapy sessions    ASSESSMENT     Hunter demonstrated improved endurance, a slightly increased malcom and more even step length.  Decreased assistance with  lifting LE's onto stool and plinth today.  Good tolerance for PRE's today.  Continued LE and hip strengthening to decrease fall risk.    Hunter Is progressing well towards her goals.   Pt prognosis is Good.     Pt will continue to benefit from skilled outpatient physical therapy to address the deficits listed in the problem list box on initial evaluation, provide pt/family education and to maximize pt's level of independence in the home and community environment.     Pt's spiritual, cultural and educational needs considered and pt agreeable to plan of care and goals.     Anticipated barriers to physical therapy: none       GOALS:  SHORT TERM GOALS: 4 weeks 10/11/2024   Recent signs and systems trend is improving in order to progress towards LTG's.  ongoing   Patient will be independent with HEP in order to further progress and return to maximal function.  ongoing   Pain rating " at Worst: 5/10 in order to progress towards increased independence with activity.  ongoing   Patient will be able to correct postural deviations in sitting and standing, to decrease pain and promote postural awareness for injury prevention.   ongoing      LONG TERM GOALS: 8 weeks 10/11/2024   Patient will return to normal ADL, recreational, and work related activities with less pain and limitation.   ongoing   Patient will improve AROM to stated goals in order to return to maximal functional potential.   ongoing   Patient will improve Strength to stated goals of appropriate musculature in order to improve functional independence.   ongoing   Pain Rating at Best: 1/10 to improve Quality of Life.   ongoing   Patient will meet predicted functional outcome (FOTO) score: 80% to increase self-worth & perceived functional ability.  ongoing   Patient will have met/partially met personal goal of being able to walk without pain.  ongoing       PLAN     Cont per Plan of Care, gait, balance, strength    Tabitha Ward, PTA

## 2024-10-14 ENCOUNTER — HOSPITAL ENCOUNTER (OUTPATIENT)
Dept: RADIOLOGY | Facility: HOSPITAL | Age: 74
Discharge: HOME | End: 2024-10-14
Attending: FAMILY MEDICINE
Payer: COMMERCIAL

## 2024-10-14 DIAGNOSIS — R74.01 ELEVATED TRANSAMINASE LEVEL: ICD-10-CM

## 2024-10-15 ENCOUNTER — CLINICAL SUPPORT (OUTPATIENT)
Dept: REHABILITATION | Facility: HOSPITAL | Age: 74
End: 2024-10-15
Payer: COMMERCIAL

## 2024-10-15 DIAGNOSIS — M62.89 MUSCLE TIGHTNESS: ICD-10-CM

## 2024-10-15 DIAGNOSIS — R53.1 DECREASED STRENGTH: Primary | ICD-10-CM

## 2024-10-15 DIAGNOSIS — R26.9 GAIT ABNORMALITY: ICD-10-CM

## 2024-10-15 DIAGNOSIS — R26.89 DECREASED FUNCTIONAL MOBILITY: ICD-10-CM

## 2024-10-15 PROCEDURE — 97112 NEUROMUSCULAR REEDUCATION: CPT | Mod: KX,PN,CQ

## 2024-10-15 PROCEDURE — 97110 THERAPEUTIC EXERCISES: CPT | Mod: KX,PN,CQ

## 2024-10-15 PROCEDURE — 97530 THERAPEUTIC ACTIVITIES: CPT | Mod: KX,PN,CQ

## 2024-10-15 NOTE — PROGRESS NOTES
[facility]   Physical Therapy Treatment Note     Name: Hunter Cisneros  Clinic Number: [id number]    Therapy Diagnosis:   Encounter Diagnoses   Name Primary?    Decreased strength Yes    Muscle tightness     Gait abnormality     Decreased functional mobility      Physician: Lenny Javier MD    Visit Date: 10/15/2024    Physician: Lenny Javier MD             Physician Orders: PT Eval and Treat  Medical Diagnosis from Referral: R29.898 (ICD-10-CM) - Weakness of both legs M25.561,M25.562,G89.29 (ICD-10-CM) - Chronic pain of both knees M25.551,M25.552 (ICD-10-CM) - Bilateral hip pain  Evaluation Date: 10/2/2024  Authorization Period Expiration: 09/27/2025  Plan of Care Expiration: 11/30/2024                          Progress Update: 11/2/2024             FOTO: 1 / 3   Visit # / Visits authorized: 3 / 12   ([email or web address])                       Precautions: Standard Precautions and Safety/fall precautions    Time In: 0858  Time Out: 0955  Total Billable Time: 57 minutes      SUBJECTIVE     Pt reports: continues with stiffness in the morning, but not as much  She was compliant with home exercise program.  Response to previous treatment: good  Functional change: ongoing    Pain: 6 / 10  Location: hips/LE's      OBJECTIVE     P1:  Intermittent central low back pain    P2:  Intermittent shooting pain down B legs to feet     P3:  Intermittent R anterior thigh pain    P4:  Intermittent B anterior knee pain      Objective Measures updated at progress report unless specified.     Treatment     Hunter received the treatments listed below:      Therapeutic exercises to develop strength and ROM for 10 minutes including:    Nustep x 10 mins     Seated Physioball rollout  2 x 10  NP    Neuromuscular re-education activities to improve: Balance, Coordination, Kinesthetic, Sense, and Proprioception for 39 minutes:       LAQs 1#  2x10 Bilateral  Neural glide, df/pf 2 x 10 Bilateral -with stool and  "heel hanging off edge of stool - supine today    SAQs 1#  2x10    Bridges 2 x 10     Clams RTB  2 x 10    Double Knee To Chest with Physioball 2 x 10    Lower trunk rotation x 10 Bilateral   Hip adduction with ball 3 x 10     +Precor:  Hip abduction 10# x 30    Therapeutic activities to improve functional performance for 8  minutes, including:    Seated marches 1# 2x10 Bilateral   Seated heel raises/toe raises 2x10 Bilateral    Step ups 6"  3 x 10 reps bilateral   Hip 3 way 2 x 10 reps Bilateral       Patient Education and Home Exercises     Home Exercises Provided and Patient Education Provided     Education provided:   - cont HOME EXERCISE PROGRAM     Written Home Exercises Provided: Patient instructed to cont prior HEP. Exercises were reviewed and Hunter was able to demonstrate them prior to the end of the session.  Hunter demonstrated good  understanding of the education provided. See EMR under Patient Instructions for exercises provided during therapy sessions    ASSESSMENT     Hunter continues to improve with her LE strength.  No assistance needed to place LE's on Nustep or plinth today.  Resistance added to Short Arc Quads today, pt tolerated without difficulty.    Hunter Is progressing well towards her goals.   Pt prognosis is Good.     Pt will continue to benefit from skilled outpatient physical therapy to address the deficits listed in the problem list box on initial evaluation, provide pt/family education and to maximize pt's level of independence in the home and community environment.     Pt's spiritual, cultural and educational needs considered and pt agreeable to plan of care and goals.     Anticipated barriers to physical therapy: none       GOALS:  SHORT TERM GOALS: 4 weeks 10/15/2024   Recent signs and systems trend is improving in order to progress towards LTG's.  ongoing   Patient will be independent with HEP in order to further progress and return to maximal function.  ongoing   Pain rating at " Worst: 5/10 in order to progress towards increased independence with activity.  ongoing   Patient will be able to correct postural deviations in sitting and standing, to decrease pain and promote postural awareness for injury prevention.   ongoing      LONG TERM GOALS: 8 weeks 10/15/2024   Patient will return to normal ADL, recreational, and work related activities with less pain and limitation.   ongoing   Patient will improve AROM to stated goals in order to return to maximal functional potential.   ongoing   Patient will improve Strength to stated goals of appropriate musculature in order to improve functional independence.   ongoing   Pain Rating at Best: 1/10 to improve Quality of Life.   ongoing   Patient will meet predicted functional outcome (FOTO) score: 80% to increase self-worth & perceived functional ability.  ongoing   Patient will have met/partially met personal goal of being able to walk without pain.  ongoing       PLAN     Cont per Plan of Care, gait, balance, strength    Tabitha Ward, PTA

## 2024-10-17 ENCOUNTER — HOSPITAL ENCOUNTER (OUTPATIENT)
Dept: RADIOLOGY | Facility: HOSPITAL | Age: 74
Discharge: HOME | End: 2024-10-17
Attending: FAMILY MEDICINE
Payer: COMMERCIAL

## 2024-10-17 ENCOUNTER — TELEPHONE (OUTPATIENT)
Dept: FAMILY MEDICINE | Facility: CLINIC | Age: 74
End: 2024-10-17
Payer: COMMERCIAL

## 2024-10-17 PROCEDURE — 76705 ECHO EXAM OF ABDOMEN: CPT | Mod: 26,,, | Performed by: RADIOLOGY

## 2024-10-17 PROCEDURE — 76705 ECHO EXAM OF ABDOMEN: CPT | Mod: TC,PO

## 2024-10-17 NOTE — TELEPHONE ENCOUNTER
Please tell patient that ultrasound just shows a cyst of the liver which should not be causing any inflammation.  She has some gallstones but no sign of gallbladder inflammation.  I see she has labs coming up on 10/22 to recheck liver enzymes.  If liver enzymes are not improving, will have to have her see a liver specialist to further determine what is  cause of the liver enzyme abnormality

## 2024-10-18 ENCOUNTER — CLINICAL SUPPORT (OUTPATIENT)
Dept: REHABILITATION | Facility: HOSPITAL | Age: 74
End: 2024-10-18
Payer: COMMERCIAL

## 2024-10-18 DIAGNOSIS — R26.89 DECREASED FUNCTIONAL MOBILITY: ICD-10-CM

## 2024-10-18 DIAGNOSIS — R26.9 GAIT ABNORMALITY: ICD-10-CM

## 2024-10-18 DIAGNOSIS — M62.89 MUSCLE TIGHTNESS: ICD-10-CM

## 2024-10-18 DIAGNOSIS — R53.1 DECREASED STRENGTH: Primary | ICD-10-CM

## 2024-10-18 PROCEDURE — 97112 NEUROMUSCULAR REEDUCATION: CPT | Mod: KX,PN,CQ

## 2024-10-18 PROCEDURE — 97110 THERAPEUTIC EXERCISES: CPT | Mod: KX,PN,CQ

## 2024-10-18 PROCEDURE — 97530 THERAPEUTIC ACTIVITIES: CPT | Mod: KX,PN,CQ

## 2024-10-18 NOTE — PROGRESS NOTES
[facility]   Physical Therapy Treatment Note     Name: Hunter Cisneros  Clinic Number: [id number]    Therapy Diagnosis:   Encounter Diagnoses   Name Primary?    Decreased strength Yes    Muscle tightness     Gait abnormality     Decreased functional mobility      Physician: Lenny Javier MD    Visit Date: 10/18/2024    Physician: Lenny Javier MD             Physician Orders: PT Eval and Treat  Medical Diagnosis from Referral: R29.898 (ICD-10-CM) - Weakness of both legs M25.561,M25.562,G89.29 (ICD-10-CM) - Chronic pain of both knees M25.551,M25.552 (ICD-10-CM) - Bilateral hip pain  Evaluation Date: 10/2/2024  Authorization Period Expiration: 09/27/2025  Plan of Care Expiration: 11/30/2024                          Progress Update: 11/2/2024             FOTO: 1 / 3   Visit # / Visits authorized: 4 / 12   ([id number])                       Precautions: Standard Precautions and Safety/fall precautions    Time In: 1256p  FOTO next visit  Time Out: 157p  Total Billable Time: 60 minutes      SUBJECTIVE     Pt reports: she is doing better  She was compliant with home exercise program.  Response to previous treatment: good  Functional change: walked to her mailbox w/o AD, walking around her house w/o AD    Pain: 0 / 10  Location: hips/LE's      OBJECTIVE     P1:  Intermittent central low back pain    P2:  Intermittent shooting pain down B legs to feet     P3:  Intermittent R anterior thigh pain    P4:  Intermittent B anterior knee pain      Objective Measures updated at progress report unless specified.     Treatment     Hunter received the treatments listed below:      Therapeutic exercises to develop strength and ROM for 10 minutes including:    Nustep x 10 mins     Seated Physioball rollout  2 x 10  NP    Neuromuscular re-education activities to improve: Balance, Coordination, Kinesthetic, Sense, and Proprioception for 36 minutes:       LAQs 1#  2x10 Bilateral   NP  Neural glide,  "df/pf 2 x 10 Bilateral -with stool and heel hanging off edge of stool - supine today  NP    SAQs 1#  2x10    Bridges 2 x 10     Clams RTB  2 x 10    Double Knee To Chest with Physioball 2 x 10    Lower trunk rotation  3 x 10 Bilateral   Hip adduction with ball 3 x 10     Precor:  Hip abduction 10# x 30    Therapeutic activities to improve functional performance for 14  minutes, including:    Seated marches 1# 2x10 Bilateral   NP  Seated heel raises/toe raises 2x10 Bilateral   NP    Step ups 6" x 10 reps bilateral   Hip 3 way x 10 reps Bilateral       Patient Education and Home Exercises     Home Exercises Provided and Patient Education Provided     Education provided:   - cont HOME EXERCISE PROGRAM     Written Home Exercises Provided: Patient instructed to cont prior HEP. Exercises were reviewed and Hunter was able to demonstrate them prior to the end of the session.  Hunter demonstrated good  understanding of the education provided. See EMR under Patient Instructions for exercises provided during therapy sessions    ASSESSMENT     Hunter displays improvement in her gait patter and LE strength.  She tolerated additional standing PRE's today, as well as increased reps with exercises.  Making good progress overall.    Hunter Is progressing well towards her goals.   Pt prognosis is Good.     Pt will continue to benefit from skilled outpatient physical therapy to address the deficits listed in the problem list box on initial evaluation, provide pt/family education and to maximize pt's level of independence in the home and community environment.     Pt's spiritual, cultural and educational needs considered and pt agreeable to plan of care and goals.     Anticipated barriers to physical therapy: none       GOALS:  SHORT TERM GOALS: 4 weeks 10/18/2024   Recent signs and systems trend is improving in order to progress towards LTG's.  ongoing   Patient will be independent with HEP in order to further progress and return to " maximal function.  ongoing   Pain rating at Worst: 5/10 in order to progress towards increased independence with activity.  ongoing   Patient will be able to correct postural deviations in sitting and standing, to decrease pain and promote postural awareness for injury prevention.   ongoing      LONG TERM GOALS: 8 weeks 10/18/2024   Patient will return to normal ADL, recreational, and work related activities with less pain and limitation.   ongoing   Patient will improve AROM to stated goals in order to return to maximal functional potential.   ongoing   Patient will improve Strength to stated goals of appropriate musculature in order to improve functional independence.   ongoing   Pain Rating at Best: 1/10 to improve Quality of Life.   ongoing   Patient will meet predicted functional outcome (FOTO) score: 80% to increase self-worth & perceived functional ability.  ongoing   Patient will have met/partially met personal goal of being able to walk without pain.  ongoing       PLAN     Cont per Plan of Care, gait, balance, strength    Tabitha Ward, PTA

## 2024-10-21 ENCOUNTER — CLINICAL SUPPORT (OUTPATIENT)
Dept: REHABILITATION | Facility: HOSPITAL | Age: 74
End: 2024-10-21
Payer: COMMERCIAL

## 2024-10-21 DIAGNOSIS — R53.1 DECREASED STRENGTH: Primary | ICD-10-CM

## 2024-10-21 DIAGNOSIS — M62.89 MUSCLE TIGHTNESS: ICD-10-CM

## 2024-10-21 DIAGNOSIS — R26.9 GAIT ABNORMALITY: ICD-10-CM

## 2024-10-21 DIAGNOSIS — R26.89 DECREASED FUNCTIONAL MOBILITY: ICD-10-CM

## 2024-10-21 PROCEDURE — 97112 NEUROMUSCULAR REEDUCATION: CPT | Mod: KX,PN,CQ

## 2024-10-21 PROCEDURE — 97530 THERAPEUTIC ACTIVITIES: CPT | Mod: KX,PN,CQ

## 2024-10-21 NOTE — PROGRESS NOTES
[facility]   Physical Therapy Treatment Note     Name: Hunter Cisneros  Clinic Number: [id number]    Therapy Diagnosis:   Encounter Diagnoses   Name Primary?    Decreased strength Yes    Muscle tightness     Gait abnormality     Decreased functional mobility      Physician: Lenny Javier MD    Visit Date: 10/21/2024    Physician: Lenny Javier MD             Physician Orders: PT Eval and Treat  Medical Diagnosis from Referral: R29.898 (ICD-10-CM) - Weakness of both legs M25.561,M25.562,G89.29 (ICD-10-CM) - Chronic pain of both knees M25.551,M25.552 (ICD-10-CM) - Bilateral hip pain  Evaluation Date: 10/2/2024  Authorization Period Expiration: 09/27/2025  Plan of Care Expiration: 11/30/2024                          Progress Update: 11/2/2024                Visit # / Visits authorized: 5 / 12   ([id number])                    FOTO: 1 / 3     Precautions: Standard Precautions and Safety/fall precautions    Time In: 0858    Time Out: 0959  Total Billable Time: 60 minutes      SUBJECTIVE     Pt reports:  doing fine today  She was compliant with home exercise program.  Response to previous treatment: good  Functional change: walked to her mailbox w/o AD, walking around her house w/o AD    Pain: 0 / 10  Location: hips/LE's      OBJECTIVE     P1:  Intermittent central low back pain    P2:  Intermittent shooting pain down B legs to feet     P3:  Intermittent R anterior thigh pain    P4:  Intermittent B anterior knee pain      Objective Measures updated at progress report unless specified.     Treatment     Hunter received the treatments listed below:      Therapeutic exercises to develop strength and ROM for 0 minutes including:    Nustep x 10 mins  NP     Seated Physioball rollout  2 x 10  NP    Neuromuscular re-education activities to improve: Balance, Coordination, Kinesthetic, Sense, and Proprioception for  30 minutes:       LAQs 2#  3x10 Bilateral    Neural glide, df/pf 2 x 10  "Bilateral -with stool and heel hanging off edge of stool   NP  +Hip extension over wedge 2 x 10 Bilateral     SAQs 2#  2x10    Bridges 2 x 10   NP  Clams RTB  2 x 10    Double Knee To Chest with Physioball 2 x 10    Lower trunk rotation  3 x 10 Bilateral   Hip adduction with ball with bridge 2 x 10     Precor:  Hip abduction 10# x 30   NP    Therapeutic activities to improve functional performance for 29  minutes, including:    Standing marches 2x10 Bilateral     Standing heel raises/toe raises 2x10 Bilateral      Step ups 6" x 20 reps bilateral   Hip 3 way x 10 reps Bilateral       Patient Education and Home Exercises     Home Exercises Provided and Patient Education Provided     Education provided:   - cont HOME EXERCISE PROGRAM     Written Home Exercises Provided: Patient instructed to cont prior HEP. Exercises were reviewed and Hunter was able to demonstrate them prior to the end of the session.  Hunter demonstrated good  understanding of the education provided. See EMR under Patient Instructions for exercises provided during therapy sessions    ASSESSMENT     Hunter demonstrates decreased ability to DF while standing.  Overall, she is making good progress with improvement in her posture and increased LE and hip strength.  Improvement in FOTO scores today.    Hunter Is progressing well towards her goals.   Pt prognosis is Good.     Pt will continue to benefit from skilled outpatient physical therapy to address the deficits listed in the problem list box on initial evaluation, provide pt/family education and to maximize pt's level of independence in the home and community environment.     Pt's spiritual, cultural and educational needs considered and pt agreeable to plan of care and goals.     Anticipated barriers to physical therapy: none       GOALS:  SHORT TERM GOALS: 4 weeks 10/21/2024   Recent signs and systems trend is improving in order to progress towards LTG's.  ongoing   Patient will be independent with " HEP in order to further progress and return to maximal function.  ongoing   Pain rating at Worst: 5/10 in order to progress towards increased independence with activity.  ongoing   Patient will be able to correct postural deviations in sitting and standing, to decrease pain and promote postural awareness for injury prevention.   ongoing      LONG TERM GOALS: 8 weeks 10/21/2024   Patient will return to normal ADL, recreational, and work related activities with less pain and limitation.   ongoing   Patient will improve AROM to stated goals in order to return to maximal functional potential.   ongoing   Patient will improve Strength to stated goals of appropriate musculature in order to improve functional independence.   ongoing   Pain Rating at Best: 1/10 to improve Quality of Life.   ongoing   Patient will meet predicted functional outcome (FOTO) score: 80% to increase self-worth & perceived functional ability.  ongoing   Patient will have met/partially met personal goal of being able to walk without pain.  ongoing       PLAN     Cont per Plan of Care, gait, balance, strength    Tabitha Ward, PTA

## 2024-10-22 ENCOUNTER — TELEPHONE (OUTPATIENT)
Dept: FAMILY MEDICINE | Facility: CLINIC | Age: 74
End: 2024-10-22
Payer: COMMERCIAL

## 2024-10-22 ENCOUNTER — LAB VISIT (OUTPATIENT)
Dept: LAB | Facility: HOSPITAL | Age: 74
End: 2024-10-22
Attending: FAMILY MEDICINE
Payer: COMMERCIAL

## 2024-10-22 DIAGNOSIS — R74.8 HIGH GAMMA GLUTAMYL TRANSFERASE (GGT): ICD-10-CM

## 2024-10-22 DIAGNOSIS — R74.01 ELEVATED TRANSAMINASE LEVEL: ICD-10-CM

## 2024-10-22 LAB
ALBUMIN SERPL BCP-MCNC: 3.1 G/DL (ref 3.5–5.2)
ALP SERPL-CCNC: 93 U/L (ref 40–150)
ALT SERPL W/O P-5'-P-CCNC: 47 U/L (ref 10–44)
ANION GAP SERPL CALC-SCNC: 8 MMOL/L (ref 8–16)
AST SERPL-CCNC: 33 U/L (ref 10–40)
BASOPHILS # BLD AUTO: 0.03 K/UL (ref 0–0.2)
BASOPHILS NFR BLD: 0.5 % (ref 0–1.9)
BILIRUB SERPL-MCNC: 0.8 MG/DL (ref 0.1–1)
BUN SERPL-MCNC: 13 MG/DL (ref 8–23)
CALCIUM SERPL-MCNC: 9.5 MG/DL (ref 8.7–10.5)
CHLORIDE SERPL-SCNC: 109 MMOL/L (ref 95–110)
CO2 SERPL-SCNC: 25 MMOL/L (ref 23–29)
CREAT SERPL-MCNC: 1 MG/DL (ref 0.5–1.4)
DIFFERENTIAL METHOD BLD: ABNORMAL
EOSINOPHIL # BLD AUTO: 0.1 K/UL (ref 0–0.5)
EOSINOPHIL NFR BLD: 2.2 % (ref 0–8)
ERYTHROCYTE [DISTWIDTH] IN BLOOD BY AUTOMATED COUNT: 16 % (ref 11.5–14.5)
EST. GFR  (NO RACE VARIABLE): 59.9 ML/MIN/1.73 M^2
FERRITIN SERPL-MCNC: 554 NG/ML (ref 20–300)
GGT SERPL-CCNC: 70 U/L (ref 8–55)
GLUCOSE SERPL-MCNC: 74 MG/DL (ref 70–110)
HCT VFR BLD AUTO: 36.6 % (ref 37–48.5)
HGB BLD-MCNC: 11.4 G/DL (ref 12–16)
IMM GRANULOCYTES # BLD AUTO: 0.01 K/UL (ref 0–0.04)
IMM GRANULOCYTES NFR BLD AUTO: 0.2 % (ref 0–0.5)
IRON SERPL-MCNC: 103 UG/DL (ref 30–160)
LYMPHOCYTES # BLD AUTO: 2.2 K/UL (ref 1–4.8)
LYMPHOCYTES NFR BLD: 35.5 % (ref 18–48)
MCH RBC QN AUTO: 28.1 PG (ref 27–31)
MCHC RBC AUTO-ENTMCNC: 31.1 G/DL (ref 32–36)
MCV RBC AUTO: 90 FL (ref 82–98)
MONOCYTES # BLD AUTO: 0.6 K/UL (ref 0.3–1)
MONOCYTES NFR BLD: 9.4 % (ref 4–15)
NEUTROPHILS # BLD AUTO: 3.3 K/UL (ref 1.8–7.7)
NEUTROPHILS NFR BLD: 52.2 % (ref 38–73)
NRBC BLD-RTO: 0 /100 WBC
PLATELET # BLD AUTO: 157 K/UL (ref 150–450)
PMV BLD AUTO: 11.8 FL (ref 9.2–12.9)
POTASSIUM SERPL-SCNC: 3.9 MMOL/L (ref 3.5–5.1)
PROT SERPL-MCNC: 6.5 G/DL (ref 6–8.4)
RBC # BLD AUTO: 4.05 M/UL (ref 4–5.4)
SATURATED IRON: 42 % (ref 20–50)
SODIUM SERPL-SCNC: 142 MMOL/L (ref 136–145)
TOTAL IRON BINDING CAPACITY: 243 UG/DL (ref 250–450)
TRANSFERRIN SERPL-MCNC: 164 MG/DL (ref 200–375)
WBC # BLD AUTO: 6.29 K/UL (ref 3.9–12.7)

## 2024-10-22 PROCEDURE — 36415 COLL VENOUS BLD VENIPUNCTURE: CPT | Mod: PO | Performed by: FAMILY MEDICINE

## 2024-10-22 PROCEDURE — 82728 ASSAY OF FERRITIN: CPT | Performed by: FAMILY MEDICINE

## 2024-10-22 PROCEDURE — 86381 MITOCHONDRIAL ANTIBODY EACH: CPT | Performed by: FAMILY MEDICINE

## 2024-10-22 PROCEDURE — 80053 COMPREHEN METABOLIC PANEL: CPT | Performed by: FAMILY MEDICINE

## 2024-10-22 PROCEDURE — 84466 ASSAY OF TRANSFERRIN: CPT | Performed by: FAMILY MEDICINE

## 2024-10-22 PROCEDURE — 86038 ANTINUCLEAR ANTIBODIES: CPT | Performed by: FAMILY MEDICINE

## 2024-10-22 PROCEDURE — 83540 ASSAY OF IRON: CPT | Performed by: FAMILY MEDICINE

## 2024-10-22 PROCEDURE — 86235 NUCLEAR ANTIGEN ANTIBODY: CPT | Mod: 59 | Performed by: FAMILY MEDICINE

## 2024-10-22 PROCEDURE — 86015 ACTIN ANTIBODY EACH: CPT | Performed by: FAMILY MEDICINE

## 2024-10-22 PROCEDURE — 86039 ANTINUCLEAR ANTIBODIES (ANA): CPT | Performed by: FAMILY MEDICINE

## 2024-10-22 PROCEDURE — 85025 COMPLETE CBC W/AUTO DIFF WBC: CPT | Performed by: FAMILY MEDICINE

## 2024-10-22 PROCEDURE — 82977 ASSAY OF GGT: CPT | Performed by: FAMILY MEDICINE

## 2024-10-22 NOTE — ASSESSMENT & PLAN NOTE
DXA Bone Density Scan ordered and to be completed 2/2025.  She was started on Prolia per PCP in March 2023.  Last dose in Oct 2024    Once complete will evaluate for possibility of stopping Prolia by giving one time dose of reclast and re-evaluating    Take Vitamin D 1000 iu every other day  Take calcium daily      Avoid falls     no radiation

## 2024-10-22 NOTE — TELEPHONE ENCOUNTER
See previous messages and phone calls   ----- Message from Kaylen sent at 10/21/2024 10:16 AM CDT -----  Type:  Patient Returning Call    Who Called:Pt   Who Left Message for Patient:Nalini  Does the patient know what this is regarding?:yes   Would the patient rather a call back or a response via [facility]? Call back  Best Call Back Number: [phone]  Additional Information:

## 2024-10-22 NOTE — PROGRESS NOTES
Rajan Espana, er attending, reevaluating pt and addressing concerns.   Subjective:      Patient ID: Hunter Cisneros is a 74 y.o. female.    Chief Complaint:  No chief complaint on file.    History of Present Illness: Hunter Cisneros is a 74 y.o. woman with Type 2 DM, HTN, CKD stage 3, postablative hypothyroidism, vitamin d deficiency, dyslipidemia, osteopenia, GERD, and ocular hypertension presents for follow up of Type 2 DM. Previous patient of mine. Last visit with myself in 4/2024.      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  Takes thyroid medication properly without food first thing in the morning.  Takes on an empty stomach and waits 30-45 minutes before eating drinking or taking other medications.    Current Symptoms:   (-) Palpitations   (-) slight tremors   (-) mental fog  (+) constipation -on and off  (-) fatigue   (-) diarrhea     Lab Results   Component Value Date    TSH 0.754 09/27/2024    B7WVUTA 116 10/06/2004    FREET4 1.15 04/09/2024     With regards to the diabetes:    Diagnosed with Type 2 DM July 2011  Family History of Type 2 DM: maternal uncles  Known complications:  DKA/HHS: denies  RN: denies; up to date; due in Feb 2025  PN: on gabapentin  Nephropathy: denies  Gastroparesis: denies  CAD: denies    Current regimen:  Metformin 500 mg twice daily  Ozempic 0.5 mg weekly - Denies any negative GI symptoms and is tolerating well since last titration.    Missed doses - denies     Other medications tried:  Invokana - too expensive    1-2 # times a day testing  Log  reviewed: Oral recall normal range 80s - 110s    Hypoglycemic event- Denies and denies s/s of hypoglycemia   Knows how to correct with 15 grams of carbs- juice, coke, or a peppermint.     Dietary recall: eating less since last visit since addition of Ozempic    Eats 3 meals a day.   Snacks: little bit             Education - last visit: politely declines    ACE/ARB: Taking    Denies personal history of pancreatitis or FH of thyroid cancer.     Lab Results   Component Value Date    HGBA1C 5.0 09/27/2024    HGBA1C 5.0 03/11/2024    HGBA1C 5.4 08/30/2023     Screening or Prevention Patient's value Goal Complete/Controlled?   HgA1C Testing and Control   Lab Results   Component Value Date    HGBA1C 5.0 09/27/2024      Annually/Less than 8% Yes   Lipid profile : 09/27/2024 Annually Yes   LDL control Lab Results   Component Value Date    LDLCALC 27.4 (L) 09/27/2024    Annually/Less than 100 mg/dl  Yes   Nephropathy screening Lab Results   Component Value Date    LABMICR 731.0 09/27/2024     Lab Results   Component Value Date    PROTEINUA Negative 04/25/2014    Annually No   Blood pressure BP Readings from Last 1 Encounters:   10/23/24 132/82    Less than 140/90 Yes   Dilated retinal exam : 02/08/2024 Annually No   Foot exam   : 09/07/2023 Annually No     With regards to obesity,     Weight loss as above  Change in diet    Body mass index is 30.45 kg/m².    Wt Readings from Last 3 Encounters:   10/23/24 0929 73.1 kg (161 lb 2.5 oz)   09/27/24 1020 73 kg (160 lb 15 oz)   04/19/24 1258 78.2 kg (172 lb 6.4 oz)     With regards to osteoporosis and Vit D deficiency,     Taking Vit D 1000 iu daily   Latest Reference Range & Units 03/11/24 11:02   Vitamin D 30 - 96 ng/mL 61     Taking caltrate 1 tabs daily; calcium in diet  Denies recent fractures or falls     She was started on Prolia per PCP in March 2023.  Last dose in Oct 2024    Social alcohol   Denies tobacco use    She is walking for exercise and working with PT for  arthritis  She is using cane today.  No formal exercise.   Denies need for dental work-has dentures     2/27/2023 DXA  COMPARISON:  02/26/2020.     FINDINGS:  The L1 to L4 vertebral bone mineral density is equal to 0.808 g/cm squared with a T score of -3.1.   The left femoral neck bone mineral density is equal to 0.568 g/cm squared with a T score of -2.7.   The total hip bone mineral density is equal to 0.721 g/cm squared with a T score of -2.0.   There is a 13% risk of a major osteoporotic fracture and a 3.2% risk of hip fracture in the next 10 years (FRAX).   Impression:   Osteoporosis    Vit D, 25-Hydroxy   Date Value Ref Range Status   03/11/2024 61 30 - 96 ng/mL Final     Comment:     Vitamin D deficiency.........<10 ng/mL                              Vitamin D insufficiency......10-29 ng/mL       Vitamin D sufficiency........> or equal to 30 ng/mL  Vitamin D toxicity............>100 ng/mL       With regards to hypertension,     She is taking losartan-HCTZ  BP at goal today    With regards to dyslipidemia,      She is on crestor 40 mg qhs- on hold per PCP as she recently had US abdomen due to elevated LFTs    Statin: Stopped taking per PCP in preparation of liver ultrasound on 10/17/2027  US ABDOMEN LIMITED_LIVER     CLINICAL HISTORY:  Elevation of levels of liver transaminase levels     TECHNIQUE:  Right upper quadrant ultrasound     COMPARISON:  10/21/2022     FINDINGS:  Liver maintains normal echogenicity.  There is a 1.5 x 1.9 cm cyst in left lobe of the liver.  There is no biliary duct dilatation..  Hepatopedal flow present in main portal vein.     There is a 3 cm gallstone.  There is no gallbladder wall thickening or pericholecystic edema.  Common duct diameter of 4 mm is normal.     Visualized pancreas is unremarkable with bowel gas obscuring portions of pancreas. .     Impression:     Cholelithiasis without evidence of acute cholecystitis     1.9 cm hepatic cyst    Results for LEXII WALDROP (MRN  [id number]) as of 8/17/2021 08:58  Lab Results   Component Value Date    LDLCALC 27.4 (L) 09/27/2024     FIB-4 Calculation: [id number] at 10/23/2024 10:13 AM     FIB-4 below 1.30 is considered as low-risk for advanced fibrosis  FIB-4 over 2.67 is considered as high-risk for advanced fibrosis  FIB-4 values between 1.30 and 2.67 are considered as intermediate-risk of advanced fibrosis for ages 36-64.     For ages > 64 the cut-off for low-risk goes to < 2.  This is a screening tool and clinical judgement should be used in the interpretation of these results.    Review of Systems   Constitutional:  Negative for fatigue and unexpected weight change.   Eyes:  Negative for visual disturbance.   Gastrointestinal:  Positive for constipation.   Endocrine: Negative for polydipsia, polyphagia and polyuria.     Objective:      Vitals:    10/23/24 0929   BP: 132/82   Pulse: 73       Physical Exam  Vitals reviewed.   Constitutional:       Appearance: She is well-developed.   Neck:      Thyroid: No thyromegaly.   Pulmonary:      Effort: Pulmonary effort is normal.   Abdominal:      Palpations: Abdomen is soft.   Musculoskeletal:      Comments: Using cane       Lab Review:   Lab Results   Component Value Date    HGBA1C 5.0 09/27/2024     Lab Results   Component Value Date    TSH 0.754 09/27/2024       Assessment:     1. Postablative hypothyroidism  levothyroxine (SYNTHROID) 88 MCG tablet      2. Mixed hyperlipidemia        3. Essential hypertension        4. Vitamin D deficiency        5. Type 2 diabetes mellitus without complication, without long-term current use of insulin  Comprehensive Metabolic Panel    Hemoglobin A1C    semaglutide (OZEMPIC) 0.25 mg or 0.5 mg (2 mg/3 mL) pen injector      6. Osteoporosis, unspecified osteoporosis type, unspecified pathological fracture presence  DXA Bone Density Axial Skeleton 1 or more sites      7. Elevated LFTs  FibroScan [place] (Vibration Controlled Transient Elastography)         Plan:     1. Postablative hypothyroidism  levothyroxine (SYNTHROID) 88 MCG tablet      2. Mixed hyperlipidemia        3. Essential hypertension        4. Vitamin D deficiency        5. Type 2 diabetes mellitus without complication, without long-term current use of insulin  Comprehensive Metabolic Panel    Hemoglobin A1C    semaglutide (OZEMPIC) 0.25 mg or 0.5 mg (2 mg/3 mL) pen injector      6. Osteoporosis, unspecified osteoporosis type, unspecified pathological fracture presence  DXA Bone Density Axial Skeleton 1 or more sites      7. Elevated LFTs  FibroScan [place] (Vibration Controlled Transient Elastography)        Mixed hyperlipidemia  LDL at goal    Patient stopped taking statin per PCP for upcomming ultrasound of liver.  Ultrasound complete on 10/17/2024.  Will refer to PCP for further care at this time.  Patient referral for hepatology placed for fiber scan.    Essential hypertension  BP slightly above goal  Continue current medications  Defer to PCP    Type 2 diabetes mellitus without complication  A1c at goal (5.0)  Check A1c every 6 months     Medication Changes   Continue Metformin 500 mg two tabs daily  Ozempic to 0.5 mg weekly. If you have increased constipation, decrease Ozempic to 0.25 + 5 clicks.  If you have hypoglycemia blood sugar less than 70, then decrease Metformin to one tab daily    Postablative hypothyroidism  --TSH at goal.     --Continue Levothyroxine 88 mcg 6 days a week skipping Sunday     --Check TSH in 6 months  --Continue monitoring Levothyroxine needs due to weight changes while on Ozempic.  No significant weight changes since last visit 9/27/2024.    Osteoporosis  DXA Bone Density Scan ordered and to be completed 2/2025.  She was started on Prolia per PCP in March 2023.  Last dose in Oct 2024    Once complete will evaluate for possibility of stopping Prolia by giving one time dose of reclast and re-evaluating    Take Vitamin D 1000 iu every other day  Take calcium daily       Avoid falls      Vitamin D deficiency  Take Vitamin D 1000 iu every other day  Take calcium daily    Visit today included increased complexity associated with the care of episodic problems diabetes, hypothyroidism, dyslipidemia, hypertension, osteoporosis addressed and managing longitudinal care of the patient due to serious managed problems diabetes, hypothyroidism, dyslipidemia, hypertension, osteoporosis     Follow up in about 6 months (around 4/23/2025).

## 2024-10-22 NOTE — ASSESSMENT & PLAN NOTE
--TSH at goal.     --Continue Levothyroxine 88 mcg 6 days a week skipping Sunday     --Check TSH in 6 months  --Continue monitoring Levothyroxine needs due to weight changes while on Ozempic.  No significant weight changes since last visit 9/27/2024.

## 2024-10-22 NOTE — ASSESSMENT & PLAN NOTE
A1c at goal (5.0)  Check A1c every 6 months     Medication Changes   Continue Metformin 500 mg two tabs daily  Ozempic to 0.5 mg weekly. If you have increased constipation, decrease Ozempic to 0.25 + 5 clicks.  If you have hypoglycemia blood sugar less than 70, then decrease Metformin to one tab daily

## 2024-10-22 NOTE — TELEPHONE ENCOUNTER
See previous messages and phone calls     ----- Message from Luzmaria sent at 10/18/2024  2:09 PM CDT -----  Type:  Patient Returning Call    Who Called:pt  Who Left Message for Patient:Nalini Shell CMA  Does the patient know what this is regarding?:returning call  Would the patient rather a call back or a response via [facility]? call  Best Call Back Number: [phone]  Additional Information:

## 2024-10-22 NOTE — ASSESSMENT & PLAN NOTE
LDL at goal    Patient stopped taking statin per PCP for upcomming ultrasound of liver.  Ultrasound complete on 10/17/2024.  Will refer to PCP for further care at this time.  Patient referral for hepatology placed for fiber scan.

## 2024-10-23 ENCOUNTER — DOCUMENTATION ONLY (OUTPATIENT)
Dept: ENDOCRINOLOGY | Facility: CLINIC | Age: 74
End: 2024-10-23

## 2024-10-23 ENCOUNTER — CLINICAL SUPPORT (OUTPATIENT)
Dept: REHABILITATION | Facility: HOSPITAL | Age: 74
End: 2024-10-23
Payer: COMMERCIAL

## 2024-10-23 ENCOUNTER — TELEPHONE (OUTPATIENT)
Dept: HEPATOLOGY | Facility: CLINIC | Age: 74
End: 2024-10-23
Payer: COMMERCIAL

## 2024-10-23 ENCOUNTER — OFFICE VISIT (OUTPATIENT)
Dept: ENDOCRINOLOGY | Facility: CLINIC | Age: 74
End: 2024-10-23
Payer: COMMERCIAL

## 2024-10-23 VITALS
SYSTOLIC BLOOD PRESSURE: 132 MMHG | DIASTOLIC BLOOD PRESSURE: 82 MMHG | HEIGHT: 61 IN | BODY MASS INDEX: 30.43 KG/M2 | WEIGHT: 161.19 LBS | OXYGEN SATURATION: 99 % | HEART RATE: 73 BPM

## 2024-10-23 DIAGNOSIS — M62.89 MUSCLE TIGHTNESS: ICD-10-CM

## 2024-10-23 DIAGNOSIS — E11.9 TYPE 2 DIABETES MELLITUS WITHOUT COMPLICATION, WITHOUT LONG-TERM CURRENT USE OF INSULIN: ICD-10-CM

## 2024-10-23 DIAGNOSIS — R26.9 GAIT ABNORMALITY: ICD-10-CM

## 2024-10-23 DIAGNOSIS — R53.1 DECREASED STRENGTH: Primary | ICD-10-CM

## 2024-10-23 DIAGNOSIS — R26.89 DECREASED FUNCTIONAL MOBILITY: ICD-10-CM

## 2024-10-23 DIAGNOSIS — R79.89 ELEVATED LFTS: ICD-10-CM

## 2024-10-23 DIAGNOSIS — E78.2 MIXED HYPERLIPIDEMIA: ICD-10-CM

## 2024-10-23 DIAGNOSIS — E89.0 POSTABLATIVE HYPOTHYROIDISM: Primary | ICD-10-CM

## 2024-10-23 DIAGNOSIS — M81.0 OSTEOPOROSIS, UNSPECIFIED OSTEOPOROSIS TYPE, UNSPECIFIED PATHOLOGICAL FRACTURE PRESENCE: ICD-10-CM

## 2024-10-23 DIAGNOSIS — I10 ESSENTIAL HYPERTENSION: ICD-10-CM

## 2024-10-23 DIAGNOSIS — E55.9 VITAMIN D DEFICIENCY: ICD-10-CM

## 2024-10-23 LAB
ANA PATTERN 1: NORMAL
ANA SER QL IF: POSITIVE
ANA TITR SER IF: NORMAL {TITER}
MITOCHONDRIA AB TITR SER IF: NORMAL {TITER}
SMOOTH MUSCLE AB TITR SER IF: NORMAL {TITER}

## 2024-10-23 PROCEDURE — 97530 THERAPEUTIC ACTIVITIES: CPT | Mod: KX,PN

## 2024-10-23 PROCEDURE — 99999 PR PBB SHADOW E&M-EST. PATIENT-LVL V: CPT | Mod: PBBFAC,,, | Performed by: NURSE PRACTITIONER

## 2024-10-23 PROCEDURE — 4010F ACE/ARB THERAPY RXD/TAKEN: CPT | Mod: CPTII,S$GLB,, | Performed by: NURSE PRACTITIONER

## 2024-10-23 PROCEDURE — 99214 OFFICE O/P EST MOD 30 MIN: CPT | Mod: S$GLB,,, | Performed by: NURSE PRACTITIONER

## 2024-10-23 PROCEDURE — 1125F AMNT PAIN NOTED PAIN PRSNT: CPT | Mod: CPTII,S$GLB,, | Performed by: NURSE PRACTITIONER

## 2024-10-23 PROCEDURE — 3060F POS MICROALBUMINURIA REV: CPT | Mod: CPTII,S$GLB,, | Performed by: NURSE PRACTITIONER

## 2024-10-23 PROCEDURE — 3008F BODY MASS INDEX DOCD: CPT | Mod: CPTII,S$GLB,, | Performed by: NURSE PRACTITIONER

## 2024-10-23 PROCEDURE — 1160F RVW MEDS BY RX/DR IN RCRD: CPT | Mod: CPTII,S$GLB,, | Performed by: NURSE PRACTITIONER

## 2024-10-23 PROCEDURE — 1159F MED LIST DOCD IN RCRD: CPT | Mod: CPTII,S$GLB,, | Performed by: NURSE PRACTITIONER

## 2024-10-23 PROCEDURE — 3044F HG A1C LEVEL LT 7.0%: CPT | Mod: CPTII,S$GLB,, | Performed by: NURSE PRACTITIONER

## 2024-10-23 PROCEDURE — 3079F DIAST BP 80-89 MM HG: CPT | Mod: CPTII,S$GLB,, | Performed by: NURSE PRACTITIONER

## 2024-10-23 PROCEDURE — 97112 NEUROMUSCULAR REEDUCATION: CPT | Mod: KX,PN

## 2024-10-23 PROCEDURE — 3066F NEPHROPATHY DOC TX: CPT | Mod: CPTII,S$GLB,, | Performed by: NURSE PRACTITIONER

## 2024-10-23 PROCEDURE — G2211 COMPLEX E/M VISIT ADD ON: HCPCS | Mod: S$GLB,,, | Performed by: NURSE PRACTITIONER

## 2024-10-23 PROCEDURE — 3075F SYST BP GE 130 - 139MM HG: CPT | Mod: CPTII,S$GLB,, | Performed by: NURSE PRACTITIONER

## 2024-10-23 RX ORDER — LEVOTHYROXINE SODIUM 88 UG/1
88 TABLET ORAL
Qty: 72 TABLET | Refills: 4 | Status: SHIPPED | OUTPATIENT
Start: 2024-10-23

## 2024-10-23 RX ORDER — SEMAGLUTIDE 0.68 MG/ML
0.5 INJECTION, SOLUTION SUBCUTANEOUS
Qty: 3 ML | Refills: 11 | Status: SHIPPED | OUTPATIENT
Start: 2024-10-23 | End: 2025-10-23

## 2024-10-23 NOTE — PROGRESS NOTES
[facility]   Physical Therapy Treatment Note     Name: Hunter Cisneros  Clinic Number: [id number]    Therapy Diagnosis:   Encounter Diagnoses   Name Primary?    Decreased strength Yes    Muscle tightness     Gait abnormality     Decreased functional mobility      Physician: Lenny Javier MD    Visit Date: 10/23/2024    Physician: Lenny Javier MD             Physician Orders: PT Eval and Treat  Medical Diagnosis from Referral: R29.898 (ICD-10-CM) - Weakness of both legs M25.561,M25.562,G89.29 (ICD-10-CM) - Chronic pain of both knees M25.551,M25.552 (ICD-10-CM) - Bilateral hip pain  Evaluation Date: 10/2/2024  Authorization Period Expiration: 09/27/2025  Plan of Care Expiration: 11/30/2024                          Progress Update: 11/2/2024                Visit # / Visits authorized: 5 / 12   ([id number])                    FOTO: 1 / 3     Precautions: Standard Precautions and Safety/fall precautions    Time In: 1555   (Pnt arrived early)  Time Out: 1650  Total Billable Time: 55 minutes      SUBJECTIVE     Pt reports:  that she feels like she is improving.  She was compliant with home exercise program.  Response to previous treatment: good  Functional change: walked to her mailbox w/o AD, walking around her house w/o AD    Pain: 4 / 10  Location: hips/LE's      OBJECTIVE     P1:  Intermittent central low back pain    P2:  Intermittent shooting pain down B legs to feet     P3:  Intermittent R anterior thigh pain    P4:  Intermittent B anterior knee pain      Objective Measures updated at progress report unless specified.     Treatment     Hunter received the treatments listed below:      Therapeutic exercises to develop strength and ROM for 0 minutes including:    Nustep x 10 mins    Seated Physioball rollout  2 x 10  NP    Neuromuscular re-education activities to improve: Balance, Coordination, Kinesthetic, Sense, and Proprioception for  30 minutes:    LAQs 2#  3x10 Bilateral   "  Neural glide, df/pf 2 x 10 Bilateral -with stool and heel hanging off edge of stool   NP  +Hip extension over wedge 2 x 10 Bilateral     SAQs 2#  2x10    Bridges 2 x 10     Clams RTB  2 x 10    Double Knee To Chest with Physioball 2 x 10    Lower trunk rotation  3 x 10 Bilateral   Hip adduction with ball with bridge 2 x 10     Precor:  Hip abduction 10# x 30   NP    Therapeutic activities to improve functional performance for 29  minutes, including:    Standing marches 2x10 Bilateral     Standing heel raises/toe raises 2x10 Bilateral      Step ups 6" x 20 reps bilateral   Hip 3 way x 10 reps Bilateral       Patient Education and Home Exercises     Home Exercises Provided and Patient Education Provided     Education provided:   - cont HOME EXERCISE PROGRAM     Written Home Exercises Provided: Patient instructed to cont prior HEP. Exercises were reviewed and Hunter was able to demonstrate them prior to the end of the session.  Hunter demonstrated good  understanding of the education provided. See EMR under Patient Instructions for exercises provided during therapy sessions    ASSESSMENT     Hunter is making good progress with improvement in her posture and increased LE and hip strength.  Will continue to progress with therapy.    Hunter Is progressing well towards her goals.   Pt prognosis is Good.     Pt will continue to benefit from skilled outpatient physical therapy to address the deficits listed in the problem list box on initial evaluation, provide pt/family education and to maximize pt's level of independence in the home and community environment.     Pt's spiritual, cultural and educational needs considered and pt agreeable to plan of care and goals.     Anticipated barriers to physical therapy: none       GOALS:  SHORT TERM GOALS: 4 weeks 10/23/2024   Recent signs and systems trend is improving in order to progress towards LTG's.  ongoing   Patient will be independent with HEP in order to further progress " and return to maximal function.  ongoing   Pain rating at Worst: 5/10 in order to progress towards increased independence with activity.  ongoing   Patient will be able to correct postural deviations in sitting and standing, to decrease pain and promote postural awareness for injury prevention.   ongoing      LONG TERM GOALS: 8 weeks 10/23/2024   Patient will return to normal ADL, recreational, and work related activities with less pain and limitation.   ongoing   Patient will improve AROM to stated goals in order to return to maximal functional potential.   ongoing   Patient will improve Strength to stated goals of appropriate musculature in order to improve functional independence.   ongoing   Pain Rating at Best: 1/10 to improve Quality of Life.   ongoing   Patient will meet predicted functional outcome (FOTO) score: 80% to increase self-worth & perceived functional ability.  ongoing   Patient will have met/partially met personal goal of being able to walk without pain.  ongoing       PLAN     Cont per Plan of Care, gait, balance, strength    Sathya Rivera, PT

## 2024-10-23 NOTE — PATIENT INSTRUCTIONS
Hypothyroidism  --TSH at goal.     --Continue Levothyroxine 88 mcg 6 days a week skipping Sunday     --Check TSH in 6 months  --Continue monitoring Levothyroxine needs due to weight changes while on Ozempic.  No significant weight changes since last visit 9/27/2024.    Diabetes  --A1c at goal: 5.0  Discussed increasing Ozemic dosage for additional weight loss and benefits associated    Hyperlipidemia  Patient stopped taking statin per PCP for upcomming ultrasound of liver.  Ultrasound complete on 10/17/2024.  Patient referral for hepatology placed for fiber scan.    Osteopetrosis  DXA Bone Density Scan ordered and to be completed 2/2025.  Once complete will evaluate for possibility of stopping Prolia.   Take Vitamin D every other day  Take calcium daily     Reclast: There are a few things I ask my patients to do before receiving reclast infusion.   1) The day before and day of the infusion please drink plenty of fluids.   2) The day of the infusion take over the counter tylenol one dose every six to eight hours for one day. This minimizes the joint pains that can occur with reclast.  3) Take an extra dose of over the counter vitamin D the day before the infusion.

## 2024-10-23 NOTE — TELEPHONE ENCOUNTER
----- Message from Linda Cm NP sent at 10/23/2024 10:27 AM CDT -----  Good morning,     Can you schedule patient for fibroscan please?  Thank you,   Linda Cm NP

## 2024-10-23 NOTE — TELEPHONE ENCOUNTER
Called the patient to schedule a fibroscan.  No answer, left message with call back # [phone].

## 2024-10-24 LAB
ANTI SM ANTIBODY: 0.07 RATIO (ref 0–0.99)
ANTI SM/RNP ANTIBODY: 0.08 RATIO (ref 0–0.99)
ANTI-SM INTERPRETATION: NEGATIVE
ANTI-SM/RNP INTERPRETATION: NEGATIVE
ANTI-SSA ANTIBODY: 0.07 RATIO (ref 0–0.99)
ANTI-SSA INTERPRETATION: NEGATIVE
ANTI-SSB ANTIBODY: 0.08 RATIO (ref 0–0.99)
ANTI-SSB INTERPRETATION: NEGATIVE
DSDNA AB SER-ACNC: NORMAL [IU]/ML

## 2024-10-28 ENCOUNTER — CLINICAL SUPPORT (OUTPATIENT)
Dept: REHABILITATION | Facility: HOSPITAL | Age: 74
End: 2024-10-28
Payer: COMMERCIAL

## 2024-10-28 DIAGNOSIS — M62.89 MUSCLE TIGHTNESS: ICD-10-CM

## 2024-10-28 DIAGNOSIS — R53.1 DECREASED STRENGTH: Primary | ICD-10-CM

## 2024-10-28 DIAGNOSIS — R26.89 DECREASED FUNCTIONAL MOBILITY: ICD-10-CM

## 2024-10-28 DIAGNOSIS — R26.9 GAIT ABNORMALITY: ICD-10-CM

## 2024-10-28 PROCEDURE — 97110 THERAPEUTIC EXERCISES: CPT | Mod: PN

## 2024-10-28 PROCEDURE — 97112 NEUROMUSCULAR REEDUCATION: CPT | Mod: PN

## 2024-10-28 PROCEDURE — 97530 THERAPEUTIC ACTIVITIES: CPT | Mod: PN

## 2024-10-29 ENCOUNTER — TELEPHONE (OUTPATIENT)
Dept: FAMILY MEDICINE | Facility: CLINIC | Age: 74
End: 2024-10-29
Payer: COMMERCIAL

## 2024-10-30 ENCOUNTER — CLINICAL SUPPORT (OUTPATIENT)
Dept: REHABILITATION | Facility: HOSPITAL | Age: 74
End: 2024-10-30
Payer: COMMERCIAL

## 2024-10-30 DIAGNOSIS — M62.89 MUSCLE TIGHTNESS: ICD-10-CM

## 2024-10-30 DIAGNOSIS — R26.89 DECREASED FUNCTIONAL MOBILITY: ICD-10-CM

## 2024-10-30 DIAGNOSIS — R53.1 DECREASED STRENGTH: Primary | ICD-10-CM

## 2024-10-30 DIAGNOSIS — R26.9 GAIT ABNORMALITY: ICD-10-CM

## 2024-10-30 PROCEDURE — 97530 THERAPEUTIC ACTIVITIES: CPT | Mod: PN

## 2024-10-30 PROCEDURE — 97112 NEUROMUSCULAR REEDUCATION: CPT | Mod: PN

## 2024-10-30 PROCEDURE — 97110 THERAPEUTIC EXERCISES: CPT | Mod: PN

## 2024-11-01 ENCOUNTER — PROCEDURE VISIT (OUTPATIENT)
Dept: HEPATOLOGY | Facility: CLINIC | Age: 74
End: 2024-11-01
Payer: COMMERCIAL

## 2024-11-01 DIAGNOSIS — R79.89 ELEVATED LFTS: ICD-10-CM

## 2024-11-04 ENCOUNTER — CLINICAL SUPPORT (OUTPATIENT)
Dept: REHABILITATION | Facility: HOSPITAL | Age: 74
End: 2024-11-04
Payer: COMMERCIAL

## 2024-11-04 DIAGNOSIS — R26.89 DECREASED FUNCTIONAL MOBILITY: ICD-10-CM

## 2024-11-04 DIAGNOSIS — M62.89 MUSCLE TIGHTNESS: ICD-10-CM

## 2024-11-04 DIAGNOSIS — R26.9 GAIT ABNORMALITY: ICD-10-CM

## 2024-11-04 DIAGNOSIS — R53.1 DECREASED STRENGTH: Primary | ICD-10-CM

## 2024-11-04 DIAGNOSIS — R79.89 ELEVATED LFTS: Primary | ICD-10-CM

## 2024-11-04 PROCEDURE — 97110 THERAPEUTIC EXERCISES: CPT | Mod: PN

## 2024-11-04 PROCEDURE — 97112 NEUROMUSCULAR REEDUCATION: CPT | Mod: PN

## 2024-11-04 PROCEDURE — 97530 THERAPEUTIC ACTIVITIES: CPT | Mod: PN

## 2024-11-04 NOTE — PROGRESS NOTES
[facility]   Physical Therapy Treatment Note     Name: Hunter Cisneros  Clinic Number: [id number]    Therapy Diagnosis:   Encounter Diagnoses   Name Primary?    Decreased strength Yes    Muscle tightness     Gait abnormality     Decreased functional mobility        Physician: Lenny Javier MD    Visit Date: 11/4/2024    Physician: Lenny Javier MD             Physician Orders: PT Eval and Treat  Medical Diagnosis from Referral: R29.898 (ICD-10-CM) - Weakness of both legs M25.561,M25.562,G89.29 (ICD-10-CM) - Chronic pain of both knees M25.551,M25.552 (ICD-10-CM) - Bilateral hip pain  Evaluation Date: 10/2/2024  Authorization Period Expiration: 09/27/2025  Plan of Care Expiration: 11/30/2024                          Progress Update: 11/2/2024                Visit # / Visits authorized: 9 / 12   ([id number])                    FOTO: 1 / 3     Precautions: Standard Precautions and Safety/fall precautions    Time In: 1038   (Pnt arrived early)  Time Out: 1134  Total Billable Time: 56 minutes      SUBJECTIVE     Pt reports:  that she is good today, no complaints.  She was compliant with home exercise program.  Response to previous treatment: good  Functional change: walked to her mailbox w/o AD, walking around her house w/o AD    Pain: 2 / 10  Location: hips/LE's      OBJECTIVE     P1:  Intermittent central low back pain    P2:  Intermittent shooting pain down B legs to feet     P3:  Intermittent R anterior thigh pain    P4:  Intermittent B anterior knee pain      Objective Measures updated at progress report unless specified.     Treatment     Hunter received the treatments listed below:      Therapeutic exercises to develop strength and ROM for 10 minutes including:    Nustep x 10 mins      Neuromuscular re-education activities to improve: Balance, Coordination, Kinesthetic, Sense, and Proprioception for  38 minutes:    Seated Physioball rollout  2 x 10   SAQs 2#  3x10    Bridges  "3 x 10       LAQs 2#  3x10 Bilateral    Neural glide, df/pf 2 x 10 Bilateral -with stool and heel hanging off edge of stool   NP  +Hip extension over wedge 2 x 10 Bilateral - np    Clams RTB  3 x 10    Double Knee To Chest with Physioball 3 x 10    Lower trunk rotation  3 x 10 Bilateral   Hip adduction with ball with bridge 2 x 10     Hip 3 way x 10 reps Bilateral     Sit to stands on 24' box  x 10    Precor:  Hip abduction 10# x 30   NP    Therapeutic activities to improve functional performance for 8  minutes, including:    Seated marches 2x10 Bilateral     Standing heel raises/toe raises 2x10 Bilateral      Step ups 6" x 10 reps bilateral       Patient Education and Home Exercises     Home Exercises Provided and Patient Education Provided     Education provided:   - cont HOME EXERCISE PROGRAM     Written Home Exercises Provided: Patient instructed to cont prior HEP. Exercises were reviewed and Hunter was able to demonstrate them prior to the end of the session.  Hunter demonstrated good  understanding of the education provided. See EMR under Patient Instructions for exercises provided during therapy sessions    ASSESSMENT     Hunter is making good progress with improvement in her posture and increased LE and hip strength.  Will continue to progress with therapy.    Hunter Is progressing well towards her goals.   Pt prognosis is Good.     Pt will continue to benefit from skilled outpatient physical therapy to address the deficits listed in the problem list box on initial evaluation, provide pt/family education and to maximize pt's level of independence in the home and community environment.     Pt's spiritual, cultural and educational needs considered and pt agreeable to plan of care and goals.     Anticipated barriers to physical therapy: none       GOALS:  SHORT TERM GOALS: 4 weeks 11/4/2024   Recent signs and systems trend is improving in order to progress towards LTG's.  ongoing   Patient will be independent " with HEP in order to further progress and return to maximal function.  ongoing   Pain rating at Worst: 5/10 in order to progress towards increased independence with activity.  ongoing   Patient will be able to correct postural deviations in sitting and standing, to decrease pain and promote postural awareness for injury prevention.   ongoing      LONG TERM GOALS: 8 weeks 11/4/2024   Patient will return to normal ADL, recreational, and work related activities with less pain and limitation.   ongoing   Patient will improve AROM to stated goals in order to return to maximal functional potential.   ongoing   Patient will improve Strength to stated goals of appropriate musculature in order to improve functional independence.   ongoing   Pain Rating at Best: 1/10 to improve Quality of Life.   ongoing   Patient will meet predicted functional outcome (FOTO) score: 80% to increase self-worth & perceived functional ability.  ongoing   Patient will have met/partially met personal goal of being able to walk without pain.  ongoing       PLAN     Cont per Plan of Care, gait, balance, strength    Sathya Rivera, PT

## 2024-11-06 ENCOUNTER — CLINICAL SUPPORT (OUTPATIENT)
Dept: REHABILITATION | Facility: HOSPITAL | Age: 74
End: 2024-11-06
Payer: COMMERCIAL

## 2024-11-06 DIAGNOSIS — R53.1 DECREASED STRENGTH: Primary | ICD-10-CM

## 2024-11-06 DIAGNOSIS — R26.89 DECREASED FUNCTIONAL MOBILITY: ICD-10-CM

## 2024-11-06 DIAGNOSIS — R26.9 GAIT ABNORMALITY: ICD-10-CM

## 2024-11-06 DIAGNOSIS — M62.89 MUSCLE TIGHTNESS: ICD-10-CM

## 2024-11-06 PROCEDURE — 97530 THERAPEUTIC ACTIVITIES: CPT | Mod: PN

## 2024-11-06 PROCEDURE — 97110 THERAPEUTIC EXERCISES: CPT | Mod: PN

## 2024-11-06 PROCEDURE — 97112 NEUROMUSCULAR REEDUCATION: CPT | Mod: PN

## 2024-11-06 NOTE — PROGRESS NOTES
[facility]   Physical Therapy Treatment Note     Name: Hunter Cisneros  Clinic Number: [id number]    Therapy Diagnosis:   Encounter Diagnoses   Name Primary?    Decreased strength Yes    Muscle tightness     Gait abnormality     Decreased functional mobility        Physician: Lenny Javier MD    Visit Date: 11/6/2024    Physician: Lenny Javier MD             Physician Orders: PT Eval and Treat  Medical Diagnosis from Referral: R29.898 (ICD-10-CM) - Weakness of both legs M25.561,M25.562,G89.29 (ICD-10-CM) - Chronic pain of both knees M25.551,M25.552 (ICD-10-CM) - Bilateral hip pain  Evaluation Date: 10/2/2024  Authorization Period Expiration: 09/27/2025  Plan of Care Expiration: 11/30/2024                          Progress Update: 11/2/2024                Visit # / Visits authorized: 9 / 12   ([id number])                    FOTO: 1 / 3     Precautions: Standard Precautions and Safety/fall precautions    Time In: 1040   (Pnt arrived early)  Time Out: 1136  Total Billable Time: 56 minutes      SUBJECTIVE     Pt reports:  that she is good today, no complaints.  She was compliant with home exercise program.  Response to previous treatment: good  Functional change: walked to her mailbox w/o AD, walking around her house w/o AD    Pain: 2 / 10  Location: hips/LE's      OBJECTIVE     P1:  Intermittent central low back pain    P2:  Intermittent shooting pain down B legs to feet     P3:  Intermittent R anterior thigh pain    P4:  Intermittent B anterior knee pain      Objective Measures updated at progress report unless specified.     Treatment     Hunter received the treatments listed below:      Therapeutic exercises to develop strength and ROM for 10 minutes including:    Nustep x 10 mins      Neuromuscular re-education activities to improve: Balance, Coordination, Kinesthetic, Sense, and Proprioception for  38 minutes:    Seated Physioball rollout  2 x 10   SAQs 2#  3x10    Bridges  "3 x 10       LAQs 2#  3x10 Bilateral    Neural glide, df/pf 2 x 10 Bilateral -with stool and heel hanging off edge of stool   NP  +Hip extension over wedge 2 x 10 Bilateral - np    Clams RTB  3 x 10    Double Knee To Chest with Physioball 3 x 10    Lower trunk rotation  3 x 10 Bilateral   Hip adduction with ball with bridge 2 x 10     Hip 3 way x 10 reps Bilateral     Sit to stands on 24' box  x 10    Precor:  Hip abduction 10# x 30   NP    Therapeutic activities to improve functional performance for 8  minutes, including:    Seated marches 2x10 Bilateral     Standing heel raises/toe raises 2x10 Bilateral      Step ups 6" x 10 reps bilateral       Patient Education and Home Exercises     Home Exercises Provided and Patient Education Provided     Education provided:   - cont HOME EXERCISE PROGRAM     Written Home Exercises Provided: Patient instructed to cont prior HEP. Exercises were reviewed and Hunter was able to demonstrate them prior to the end of the session.  Hunter demonstrated good  understanding of the education provided. See EMR under Patient Instructions for exercises provided during therapy sessions    ASSESSMENT     Hunter is making good progress with improvement in her posture and increased LE and hip strength.  Requires frequent breaks. Will continue to progress with therapy.    Hunter Is progressing well towards her goals.   Pt prognosis is Good.     Pt will continue to benefit from skilled outpatient physical therapy to address the deficits listed in the problem list box on initial evaluation, provide pt/family education and to maximize pt's level of independence in the home and community environment.     Pt's spiritual, cultural and educational needs considered and pt agreeable to plan of care and goals.     Anticipated barriers to physical therapy: none       GOALS:  SHORT TERM GOALS: 4 weeks 11/6/2024   Recent signs and systems trend is improving in order to progress towards LTG's.  ongoing "   Patient will be independent with HEP in order to further progress and return to maximal function.  ongoing   Pain rating at Worst: 5/10 in order to progress towards increased independence with activity.  ongoing   Patient will be able to correct postural deviations in sitting and standing, to decrease pain and promote postural awareness for injury prevention.   ongoing      LONG TERM GOALS: 8 weeks 11/6/2024   Patient will return to normal ADL, recreational, and work related activities with less pain and limitation.   ongoing   Patient will improve AROM to stated goals in order to return to maximal functional potential.   ongoing   Patient will improve Strength to stated goals of appropriate musculature in order to improve functional independence.   ongoing   Pain Rating at Best: 1/10 to improve Quality of Life.   ongoing   Patient will meet predicted functional outcome (FOTO) score: 80% to increase self-worth & perceived functional ability.  ongoing   Patient will have met/partially met personal goal of being able to walk without pain.  ongoing       PLAN     Cont per Plan of Care, gait, balance, strength    Sathya Rivera, PT

## 2024-11-11 ENCOUNTER — CLINICAL SUPPORT (OUTPATIENT)
Dept: REHABILITATION | Facility: HOSPITAL | Age: 74
End: 2024-11-11
Payer: COMMERCIAL

## 2024-11-11 DIAGNOSIS — R53.1 DECREASED STRENGTH: Primary | ICD-10-CM

## 2024-11-11 DIAGNOSIS — R26.89 DECREASED FUNCTIONAL MOBILITY: ICD-10-CM

## 2024-11-11 DIAGNOSIS — M62.89 MUSCLE TIGHTNESS: ICD-10-CM

## 2024-11-11 DIAGNOSIS — R26.9 GAIT ABNORMALITY: ICD-10-CM

## 2024-11-11 PROCEDURE — 97110 THERAPEUTIC EXERCISES: CPT | Mod: PN

## 2024-11-11 PROCEDURE — 97112 NEUROMUSCULAR REEDUCATION: CPT | Mod: PN

## 2024-11-11 PROCEDURE — 97530 THERAPEUTIC ACTIVITIES: CPT | Mod: PN

## 2024-11-11 NOTE — PROGRESS NOTES
[facility]   Physical Therapy Treatment Note     Name: Hunter Cisneros  Clinic Number: [id number]    Therapy Diagnosis:   Encounter Diagnoses   Name Primary?    Decreased strength Yes    Muscle tightness     Gait abnormality     Decreased functional mobility        Physician: Lenny Javier MD    Visit Date: 11/11/2024    Physician: Lenny Javier MD             Physician Orders: PT Eval and Treat  Medical Diagnosis from Referral: R29.898 (ICD-10-CM) - Weakness of both legs M25.561,M25.562,G89.29 (ICD-10-CM) - Chronic pain of both knees M25.551,M25.552 (ICD-10-CM) - Bilateral hip pain  Evaluation Date: 10/2/2024  Authorization Period Expiration: 09/27/2025  Plan of Care Expiration: 11/30/2024                          Progress Update: 11/2/2024                Visit # / Visits authorized: 11 / 12   ([id number])                    FOTO: 1 / 3     Precautions: Standard Precautions and Safety/fall precautions    Time In: 1045  (Pnt arrived early)  Time Out: 1141  Total Billable Time: 56 minutes      SUBJECTIVE     Pt reports:  that she is good today, no complaints.  She was compliant with home exercise program.  Response to previous treatment: good  Functional change: walked to her mailbox w/o AD, walking around her house w/o AD    Pain: 2 / 10  Location: hips/LE's      OBJECTIVE     P1:  Intermittent central low back pain    P2:  Intermittent shooting pain down B legs to feet     P3:  Intermittent R anterior thigh pain    P4:  Intermittent B anterior knee pain      Objective Measures updated at progress report unless specified.     Treatment     Hunter received the treatments listed below:      Therapeutic exercises to develop strength and ROM for 10 minutes including:    Nustep x 10 mins      Neuromuscular re-education activities to improve: Balance, Coordination, Kinesthetic, Sense, and Proprioception for  38 minutes:    Seated Physioball rollout  2 x 10   SAQs 2#  3x10   "  Bridges 3 x 10       LAQs 2#  3x10 Bilateral    Neural glide, df/pf 2 x 10 Bilateral -with stool and heel hanging off edge of stool   NP  +Hip extension over wedge 2 x 10 Bilateral - np    Clams RTB  3 x 10    Double Knee To Chest with Physioball 3 x 10    Lower trunk rotation  3 x 10 Bilateral   Hip adduction with ball with bridge 2 x 10     Hip 3 way x 10 reps Bilateral  - np  Sit to stands on 24' box  x 10 - np    Precor:  Hip abduction 10# x 30   NP    Therapeutic activities to improve functional performance for 8  minutes, including:    Seated marches 2x10 Bilateral     Standing heel raises/toe raises 2x10 Bilateral      Step ups 6" x 10 reps bilateral - np      Patient Education and Home Exercises     Home Exercises Provided and Patient Education Provided     Education provided:   - cont HOME EXERCISE PROGRAM     Written Home Exercises Provided: Patient instructed to cont prior HEP. Exercises were reviewed and Hunter was able to demonstrate them prior to the end of the session.  Hunter demonstrated good  understanding of the education provided. See EMR under Patient Instructions for exercises provided during therapy sessions    ASSESSMENT     Hunter is making good progress with improvement in her posture and increased LE and hip strength.  Requires frequent breaks. Will continue to progress with therapy.    Hunter Is progressing well towards her goals.   Pt prognosis is Good.     Pt will continue to benefit from skilled outpatient physical therapy to address the deficits listed in the problem list box on initial evaluation, provide pt/family education and to maximize pt's level of independence in the home and community environment.     Pt's spiritual, cultural and educational needs considered and pt agreeable to plan of care and goals.     Anticipated barriers to physical therapy: none       GOALS:  SHORT TERM GOALS: 4 weeks 11/11/2024   Recent signs and systems trend is improving in order to progress " towards LTG's.  ongoing   Patient will be independent with HEP in order to further progress and return to maximal function.  ongoing   Pain rating at Worst: 5/10 in order to progress towards increased independence with activity.  ongoing   Patient will be able to correct postural deviations in sitting and standing, to decrease pain and promote postural awareness for injury prevention.   ongoing      LONG TERM GOALS: 8 weeks 11/11/2024   Patient will return to normal ADL, recreational, and work related activities with less pain and limitation.   ongoing   Patient will improve AROM to stated goals in order to return to maximal functional potential.   ongoing   Patient will improve Strength to stated goals of appropriate musculature in order to improve functional independence.   ongoing   Pain Rating at Best: 1/10 to improve Quality of Life.   ongoing   Patient will meet predicted functional outcome (FOTO) score: 80% to increase self-worth & perceived functional ability.  ongoing   Patient will have met/partially met personal goal of being able to walk without pain.  ongoing       PLAN     Cont per Plan of Care, gait, balance, strength    Sathya Rivera, PT

## 2024-11-15 ENCOUNTER — CLINICAL SUPPORT (OUTPATIENT)
Dept: REHABILITATION | Facility: HOSPITAL | Age: 74
End: 2024-11-15
Payer: COMMERCIAL

## 2024-11-15 ENCOUNTER — DOCUMENTATION ONLY (OUTPATIENT)
Dept: REHABILITATION | Facility: HOSPITAL | Age: 74
End: 2024-11-15

## 2024-11-15 DIAGNOSIS — R26.89 DECREASED FUNCTIONAL MOBILITY: ICD-10-CM

## 2024-11-15 DIAGNOSIS — M62.89 MUSCLE TIGHTNESS: ICD-10-CM

## 2024-11-15 DIAGNOSIS — R26.9 GAIT ABNORMALITY: ICD-10-CM

## 2024-11-15 DIAGNOSIS — R53.1 DECREASED STRENGTH: Primary | ICD-10-CM

## 2024-11-15 PROCEDURE — 97112 NEUROMUSCULAR REEDUCATION: CPT | Mod: KX,PN,CQ

## 2024-11-15 PROCEDURE — 97530 THERAPEUTIC ACTIVITIES: CPT | Mod: KX,PN,CQ

## 2024-11-15 PROCEDURE — 97110 THERAPEUTIC EXERCISES: CPT | Mod: KX,PN,CQ

## 2024-11-15 NOTE — PROGRESS NOTES
"[facility]   Physical Therapy Treatment Note     Name: Hunter Cisneros  Clinic Number: [id number]    Therapy Diagnosis:   Encounter Diagnoses   Name Primary?    Decreased strength Yes    Muscle tightness     Gait abnormality     Decreased functional mobility        Physician: Lenny Javier MD    Visit Date: 11/15/2024    Physician: Lenny Javier MD             Physician Orders: PT Eval and Treat  Medical Diagnosis from Referral: R29.898 (ICD-10-CM) - Weakness of both legs M25.561,M25.562,G89.29 (ICD-10-CM) - Chronic pain of both knees M25.551,M25.552 (ICD-10-CM) - Bilateral hip pain  Evaluation Date: 10/2/2024  Authorization Period Expiration: 09/27/2025  Plan of Care Expiration: 11/30/2024                          Progress Update: 11/2/2024                Visit # / Visits authorized: 12 / 12   ([id number])                    FOTO: 3 / 3  FOTO CLOSED    Precautions: Standard Precautions and Safety/fall precautions    Time In: 1000  Plan of Care next visit  Time Out: 1054  Total Billable Time: 54 minutes      SUBJECTIVE     Pt reports: "I'm walking better"  She was compliant with home exercise program.  Response to previous treatment: good  Functional change: walked to her mailbox w/o AD, walking around her house w/o AD    Pain: 0 / 10  Location: hips/LE's      OBJECTIVE     P1:  Intermittent central low back pain    P2:  Intermittent shooting pain down B legs to feet     P3:  Intermittent R anterior thigh pain    P4:  Intermittent B anterior knee pain      Objective Measures updated at progress report unless specified.     Treatment     Hunter received the treatments listed below:      Therapeutic exercises to develop strength and ROM for 10 minutes including:    Nustep x 10 mins      Neuromuscular re-education activities to improve: Balance, Coordination, Kinesthetic, Sense, and Proprioception for  29 minutes:    Seated Physioball rollout  2 x 10 NP  SAQs 2#  3x10    Bridges " "3 x 10       LAQs 2#  3x10 Bilateral    Neural glide, df/pf 2 x 10 Bilateral -with stool and heel hanging off edge of stool   NP  +Hip extension over wedge 2 x 10 Bilateral - np    Clams GTB  3 x 10    Double Knee To Chest with Physioball 3 x 10    Lower trunk rotation  2 x 10 Bilateral   Hip adduction with ball with bridge 2 x 10       Precor:  Hip abduction 10# x 30   NP    Therapeutic activities to improve functional performance for 15  minutes, including:    Standing marches 2x10 Bilateral   NP  Standing heel raises/toe raises 2x10 Bilateral    Hip 3 way x 10 reps Bilateral  - np  Sit to stands on 24' box  x 10 - np  Step ups 6" x 10 reps bilateral       Patient Education and Home Exercises     Home Exercises Provided and Patient Education Provided     Education provided:   - cont HOME EXERCISE PROGRAM     Written Home Exercises Provided: Patient instructed to cont prior HEP. Exercises were reviewed and Hunter was able to demonstrate them prior to the end of the session.  Hunter demonstrated good  understanding of the education provided. See EMR under Patient Instructions for exercises provided during therapy sessions    ASSESSMENT     Hunter has improved her gait pattern since her initial evaluation.  Decreased DF in standing, which increases her fall risk.  Improvement on FOTO scores.    Hunter Is progressing well towards her goals.   Pt prognosis is Good.     Pt will continue to benefit from skilled outpatient physical therapy to address the deficits listed in the problem list box on initial evaluation, provide pt/family education and to maximize pt's level of independence in the home and community environment.     Pt's spiritual, cultural and educational needs considered and pt agreeable to plan of care and goals.     Anticipated barriers to physical therapy: none       GOALS:  SHORT TERM GOALS: 4 weeks 11/15/2024   Recent signs and systems trend is improving in order to progress towards LTG's.  ongoing "   Patient will be independent with HEP in order to further progress and return to maximal function.  ongoing   Pain rating at Worst: 5/10 in order to progress towards increased independence with activity.  ongoing   Patient will be able to correct postural deviations in sitting and standing, to decrease pain and promote postural awareness for injury prevention.   ongoing      LONG TERM GOALS: 8 weeks 11/15/2024   Patient will return to normal ADL, recreational, and work related activities with less pain and limitation.   ongoing   Patient will improve AROM to stated goals in order to return to maximal functional potential.   ongoing   Patient will improve Strength to stated goals of appropriate musculature in order to improve functional independence.   ongoing   Pain Rating at Best: 1/10 to improve Quality of Life.   ongoing   Patient will meet predicted functional outcome (FOTO) score: 80% to increase self-worth & perceived functional ability.  ongoing   Patient will have met/partially met personal goal of being able to walk without pain.  ongoing       PLAN     Cont per Plan of Care, gait, balance, strength    Tabitha Ward, PTA

## 2024-11-16 DIAGNOSIS — E78.2 MIXED HYPERLIPIDEMIA: ICD-10-CM

## 2024-11-18 ENCOUNTER — CLINICAL SUPPORT (OUTPATIENT)
Dept: REHABILITATION | Facility: HOSPITAL | Age: 74
End: 2024-11-18
Payer: COMMERCIAL

## 2024-11-18 DIAGNOSIS — R53.1 DECREASED STRENGTH: Primary | ICD-10-CM

## 2024-11-18 DIAGNOSIS — R26.89 DECREASED FUNCTIONAL MOBILITY: ICD-10-CM

## 2024-11-18 DIAGNOSIS — M62.89 MUSCLE TIGHTNESS: ICD-10-CM

## 2024-11-18 DIAGNOSIS — R26.9 GAIT ABNORMALITY: ICD-10-CM

## 2024-11-18 PROCEDURE — 97112 NEUROMUSCULAR REEDUCATION: CPT | Mod: PN

## 2024-11-18 PROCEDURE — 97530 THERAPEUTIC ACTIVITIES: CPT | Mod: PN

## 2024-11-18 PROCEDURE — 97110 THERAPEUTIC EXERCISES: CPT | Mod: PN

## 2024-11-18 RX ORDER — ROSUVASTATIN CALCIUM 40 MG/1
40 TABLET, COATED ORAL NIGHTLY
Qty: 90 TABLET | Refills: 0 | Status: SHIPPED | OUTPATIENT
Start: 2024-11-18

## 2024-11-18 NOTE — PROGRESS NOTES
[facility]   Physical Therapy Treatment Note     Name: Hunter Cisneros  Clinic Number: [id number]    Therapy Diagnosis:   Encounter Diagnoses   Name Primary?    Decreased strength Yes    Muscle tightness     Gait abnormality     Decreased functional mobility        Physician: Lenny Javier MD    Visit Date: 11/18/2024    Physician: Lenny Javier MD             Physician Orders: PT Eval and Treat  Medical Diagnosis from Referral: R29.898 (ICD-10-CM) - Weakness of both legs M25.561,M25.562,G89.29 (ICD-10-CM) - Chronic pain of both knees M25.551,M25.552 (ICD-10-CM) - Bilateral hip pain  Evaluation Date: 10/2/2024  Authorization Period Expiration: 09/27/2025  Plan of Care Expiration: 11/30/2024                          Progress Update: 11/2/2024                Visit # / Visits authorized: 13  / 15   ([id number])                    FOTO: 3 / 3  FOTO CLOSED    Precautions: Standard Precautions and Safety/fall precautions    Time In: 1400    Time Out: 1445  (Pnt had to stop therex due to not feeling well)  Total Billable Time: 45 minutes      SUBJECTIVE     Pt reports: that she had her covid and flu shot yesterday and she is not feeling well.  She was compliant with home exercise program.  Response to previous treatment: good  Functional change: walked to her mailbox w/o AD, walking around her house w/o AD    Pain: 0 / 10  Location: hips/LE's      OBJECTIVE     P1:  Intermittent central low back pain    P2:  Intermittent shooting pain down B legs to feet     P3:  Intermittent R anterior thigh pain    P4:  Intermittent B anterior knee pain      Objective Measures updated at progress report unless specified.     Treatment     Hunter received the treatments listed below:      Therapeutic exercises to develop strength and ROM for 10 minutes including:    Nustep x 10 mins      Neuromuscular re-education activities to improve: Balance, Coordination, Kinesthetic, Sense, and Proprioception  "for  27 minutes:    Seated Physioball rollout  2 x 10 NP  SAQs 2#  3x10    Bridges 3 x 10       LAQs 2#  3x10 Bilateral    Neural glide, df/pf 2 x 10 Bilateral -with stool and heel hanging off edge of stool   NP  +Hip extension over wedge 2 x 10 Bilateral - np    Clams GTB  3 x 10    Double Knee To Chest with Physioball 3 x 10    Lower trunk rotation  2 x 10 Bilateral   Hip adduction with ball with bridge 2 x 10     Hip 3 way x 10 reps Bilateral  - np  Sit to stands on 24' box  x 10 - np    Precor:  Hip abduction 10# x 30   NP    Therapeutic activities to improve functional performance for 8  minutes, including:    Standing marches 2x10 Bilateral   NP  Standing heel raises/toe raises 2x10 Bilateral      Step ups 6" x 10 reps bilateral       Patient Education and Home Exercises     Home Exercises Provided and Patient Education Provided     Education provided:   - cont HOME EXERCISE PROGRAM     Written Home Exercises Provided: Patient instructed to cont prior HEP. Exercises were reviewed and Hunter was able to demonstrate them prior to the end of the session.  Hunter demonstrated good  understanding of the education provided. See EMR under Patient Instructions for exercises provided during therapy sessions    ASSESSMENT     Hunter displays improvements in gait pattern and strength.  Displays increased levels of fatigue today requiring multiple rest breaks.  Will continue to progress with therapy.    Hunter Is progressing well towards her goals.   Pt prognosis is Good.     Pt will continue to benefit from skilled outpatient physical therapy to address the deficits listed in the problem list box on initial evaluation, provide pt/family education and to maximize pt's level of independence in the home and community environment.     Pt's spiritual, cultural and educational needs considered and pt agreeable to plan of care and goals.     Anticipated barriers to physical therapy: none       GOALS:  SHORT TERM GOALS: 4 weeks " 11/18/2024   Recent signs and systems trend is improving in order to progress towards LTG's.  ongoing   Patient will be independent with HEP in order to further progress and return to maximal function.  ongoing   Pain rating at Worst: 5/10 in order to progress towards increased independence with activity.  ongoing   Patient will be able to correct postural deviations in sitting and standing, to decrease pain and promote postural awareness for injury prevention.   ongoing      LONG TERM GOALS: 8 weeks 11/18/2024   Patient will return to normal ADL, recreational, and work related activities with less pain and limitation.   ongoing   Patient will improve AROM to stated goals in order to return to maximal functional potential.   ongoing   Patient will improve Strength to stated goals of appropriate musculature in order to improve functional independence.   ongoing   Pain Rating at Best: 1/10 to improve Quality of Life.   ongoing   Patient will meet predicted functional outcome (FOTO) score: 80% to increase self-worth & perceived functional ability.  ongoing   Patient will have met/partially met personal goal of being able to walk without pain.  ongoing       PLAN     Cont per Plan of Care, gait, balance, strength    Sathya Rivera, PT

## 2024-11-26 ENCOUNTER — CLINICAL SUPPORT (OUTPATIENT)
Dept: REHABILITATION | Facility: HOSPITAL | Age: 74
End: 2024-11-26
Payer: COMMERCIAL

## 2024-11-26 DIAGNOSIS — R26.89 DECREASED FUNCTIONAL MOBILITY: ICD-10-CM

## 2024-11-26 DIAGNOSIS — R53.1 DECREASED STRENGTH: Primary | ICD-10-CM

## 2024-11-26 DIAGNOSIS — M62.89 MUSCLE TIGHTNESS: ICD-10-CM

## 2024-11-26 DIAGNOSIS — R26.9 GAIT ABNORMALITY: ICD-10-CM

## 2024-11-26 PROCEDURE — 97110 THERAPEUTIC EXERCISES: CPT | Mod: PN

## 2024-11-26 PROCEDURE — 97530 THERAPEUTIC ACTIVITIES: CPT | Mod: PN

## 2024-11-26 PROCEDURE — 97112 NEUROMUSCULAR REEDUCATION: CPT | Mod: PN

## 2024-11-26 NOTE — PROGRESS NOTES
[facility]   Physical Therapy Treatment and Discharge Note     Name: Hunter Cisneros  Clinic Number: [id number]    Therapy Diagnosis:   Encounter Diagnoses   Name Primary?    Decreased strength Yes    Muscle tightness     Gait abnormality     Decreased functional mobility        Physician: Lenny Javier MD    Visit Date: 11/26/2024    Physician: Lenny Javier MD             Physician Orders: PT Eval and Treat  Medical Diagnosis from Referral: R29.898 (ICD-10-CM) - Weakness of both legs M25.561,M25.562,G89.29 (ICD-10-CM) - Chronic pain of both knees M25.551,M25.552 (ICD-10-CM) - Bilateral hip pain  Evaluation Date: 10/2/2024  Authorization Period Expiration: 09/27/2025  Plan of Care Expiration: 11/30/2024                          Progress Update: 11/2/2024                Visit # / Visits authorized: 13  / 15   ([id number])                    FOTO: 3 / 3  FOTO CLOSED    Precautions: Standard Precautions and Safety/fall precautions    Time In: 0950  (Pnt arrived early)  Time Out: 1045  Total Billable Time: 55 minutes      SUBJECTIVE     Pt reports: that she feels great today, no complaints.  States that she feels confident that she can perform exercises at home.  She was compliant with home exercise program.  Response to previous treatment: good  Functional change: walked to her mailbox w/o AD, walking around her house w/o AD    Pain: 0 / 10  Location: hips/LE's      OBJECTIVE     P1:  Intermittent central low back pain    P2:  Intermittent shooting pain down B legs to feet     P3:  Intermittent R anterior thigh pain    P4:  Intermittent B anterior knee pain      Objective Measures updated at progress report unless specified.     Treatment     Hunter received the treatments listed below:      Therapeutic exercises to develop strength and ROM for 10 minutes including:    Nustep x 10 mins      Neuromuscular re-education activities to improve: Balance, Coordination, Kinesthetic,  "Sense, and Proprioception for  38 minutes:    Seated Physioball rollout  2 x 10 NP  SAQs 2#  3x10    Bridges 3 x 10       LAQs 2#  3x10 Bilateral    Neural glide, df/pf 2 x 10 Bilateral -with stool and heel hanging off edge of stool   NP  +Hip extension over wedge 2 x 10 Bilateral - np    Clams GTB  3 x 10    Double Knee To Chest with Physioball 3 x 10    Lower trunk rotation  2 x 10 Bilateral   Hip adduction with ball with bridge 2 x 10     Hip 3 way x 10 reps Bilateral  - np  Sit to stands on 24' box  x 10    Precor:  Hip abduction 10# x 30   NP    Therapeutic activities to improve functional performance for 8  minutes, including:    Standing marches 2x10 Bilateral   NP  Standing heel raises/toe raises 2x10 Bilateral      Step ups 6" x 10 reps bilateral       Patient Education and Home Exercises     Home Exercises Provided and Patient Education Provided     Education provided:   - cont HOME EXERCISE PROGRAM     Written Home Exercises Provided: Patient instructed to cont prior HEP. Exercises were reviewed and Hunter was able to demonstrate them prior to the end of the session.  Hunter demonstrated good  understanding of the education provided. See EMR under Patient Instructions for exercises provided during therapy sessions    ASSESSMENT     Hunter displays improvements in gait pattern and strength.  Displays increased levels of fatigue today requiring multiple rest breaks.  Will continue to progress with therapy.    Hunter Is progressing well towards her goals.   Pt prognosis is Good.     Pt is being discharged from physical therapy and will continue exercises as HEP.      Pt's spiritual, cultural and educational needs considered and pt agreeable to plan of care and goals.     Anticipated barriers to physical therapy: none       GOALS:  SHORT TERM GOALS: 4 weeks 11/26/2024   Recent signs and systems trend is improving in order to progress towards LTG's.  ongoing   Patient will be independent with HEP in order to " further progress and return to maximal function.  ongoing   Pain rating at Worst: 5/10 in order to progress towards increased independence with activity.  ongoing   Patient will be able to correct postural deviations in sitting and standing, to decrease pain and promote postural awareness for injury prevention.   ongoing      LONG TERM GOALS: 8 weeks 11/26/2024   Patient will return to normal ADL, recreational, and work related activities with less pain and limitation.   ongoing   Patient will improve AROM to stated goals in order to return to maximal functional potential.   ongoing   Patient will improve Strength to stated goals of appropriate musculature in order to improve functional independence.   ongoing   Pain Rating at Best: 1/10 to improve Quality of Life.   ongoing   Patient will meet predicted functional outcome (FOTO) score: 80% to increase self-worth & perceived functional ability.  ongoing   Patient will have met/partially met personal goal of being able to walk without pain.  ongoing       PLAN     Cont per Plan of Care, gait, balance, strength    Sathya Rivera, PT

## 2024-12-04 ENCOUNTER — LAB VISIT (OUTPATIENT)
Dept: LAB | Facility: HOSPITAL | Age: 74
End: 2024-12-04
Attending: NURSE PRACTITIONER
Payer: COMMERCIAL

## 2024-12-04 DIAGNOSIS — R79.89 ELEVATED LFTS: ICD-10-CM

## 2024-12-04 LAB
ALBUMIN SERPL BCP-MCNC: 3.3 G/DL (ref 3.5–5.2)
ALP SERPL-CCNC: 82 U/L (ref 40–150)
ALT SERPL W/O P-5'-P-CCNC: 42 U/L (ref 10–44)
AST SERPL-CCNC: 43 U/L (ref 10–40)
BILIRUB DIRECT SERPL-MCNC: 0.1 MG/DL (ref 0.1–0.3)
BILIRUB SERPL-MCNC: 0.3 MG/DL (ref 0.1–1)
PROT SERPL-MCNC: 6.6 G/DL (ref 6–8.4)

## 2024-12-04 PROCEDURE — 36415 COLL VENOUS BLD VENIPUNCTURE: CPT | Mod: PO | Performed by: NURSE PRACTITIONER

## 2024-12-04 PROCEDURE — 80076 HEPATIC FUNCTION PANEL: CPT | Performed by: NURSE PRACTITIONER

## 2024-12-05 ENCOUNTER — TELEPHONE (OUTPATIENT)
Dept: ENDOCRINOLOGY | Facility: CLINIC | Age: 74
End: 2024-12-05
Payer: COMMERCIAL

## 2024-12-06 RX ORDER — AMLODIPINE BESYLATE 5 MG/1
TABLET ORAL
Qty: 90 TABLET | Refills: 3 | Status: SHIPPED | OUTPATIENT
Start: 2024-12-06

## 2024-12-06 NOTE — TELEPHONE ENCOUNTER
No care due was identified.  [facility] Embedded Care Due Messages. Reference number: [id number].   12/06/2024 6:14:39 AM CST

## 2024-12-06 NOTE — TELEPHONE ENCOUNTER
Hunter Cisneros  is requesting a refill authorization.  Brief Assessment and Rationale for Refill:  Approve     Medication Therapy Plan:         Comments:     Note composed:7:07 AM 12/06/2024

## 2024-12-09 ENCOUNTER — TELEPHONE (OUTPATIENT)
Dept: ENDOCRINOLOGY | Facility: CLINIC | Age: 74
End: 2024-12-09
Payer: COMMERCIAL

## 2025-01-28 ENCOUNTER — OFFICE VISIT (OUTPATIENT)
Dept: FAMILY MEDICINE | Facility: CLINIC | Age: 75
End: 2025-01-28
Payer: COMMERCIAL

## 2025-01-28 ENCOUNTER — LAB VISIT (OUTPATIENT)
Dept: LAB | Facility: HOSPITAL | Age: 75
End: 2025-01-28
Payer: COMMERCIAL

## 2025-01-28 VITALS
WEIGHT: 164 LBS | HEART RATE: 68 BPM | TEMPERATURE: 98 F | OXYGEN SATURATION: 99 % | DIASTOLIC BLOOD PRESSURE: 64 MMHG | HEIGHT: 61 IN | SYSTOLIC BLOOD PRESSURE: 122 MMHG | BODY MASS INDEX: 30.96 KG/M2

## 2025-01-28 DIAGNOSIS — E78.2 MIXED HYPERLIPIDEMIA: ICD-10-CM

## 2025-01-28 DIAGNOSIS — Z12.11 COLON CANCER SCREENING: ICD-10-CM

## 2025-01-28 DIAGNOSIS — Z00.00 ROUTINE GENERAL MEDICAL EXAMINATION AT A HEALTH CARE FACILITY: ICD-10-CM

## 2025-01-28 DIAGNOSIS — E03.9 HYPOTHYROIDISM, UNSPECIFIED TYPE: ICD-10-CM

## 2025-01-28 DIAGNOSIS — Z12.31 BREAST CANCER SCREENING BY MAMMOGRAM: ICD-10-CM

## 2025-01-28 DIAGNOSIS — I10 ESSENTIAL HYPERTENSION: ICD-10-CM

## 2025-01-28 DIAGNOSIS — E11.9 TYPE 2 DIABETES MELLITUS WITHOUT COMPLICATION, WITHOUT LONG-TERM CURRENT USE OF INSULIN: ICD-10-CM

## 2025-01-28 DIAGNOSIS — Z00.00 ROUTINE GENERAL MEDICAL EXAMINATION AT A HEALTH CARE FACILITY: Primary | ICD-10-CM

## 2025-01-28 DIAGNOSIS — K21.9 GASTROESOPHAGEAL REFLUX DISEASE, UNSPECIFIED WHETHER ESOPHAGITIS PRESENT: ICD-10-CM

## 2025-01-28 LAB
ALBUMIN SERPL BCP-MCNC: 3.6 G/DL (ref 3.5–5.2)
ALP SERPL-CCNC: 71 U/L (ref 40–150)
ALT SERPL W/O P-5'-P-CCNC: 14 U/L (ref 10–44)
ANION GAP SERPL CALC-SCNC: 9 MMOL/L (ref 8–16)
AST SERPL-CCNC: 19 U/L (ref 10–40)
BASOPHILS # BLD AUTO: 0.05 K/UL (ref 0–0.2)
BASOPHILS NFR BLD: 0.5 % (ref 0–1.9)
BILIRUB SERPL-MCNC: 0.5 MG/DL (ref 0.1–1)
BUN SERPL-MCNC: 23 MG/DL (ref 8–23)
CALCIUM SERPL-MCNC: 9.9 MG/DL (ref 8.7–10.5)
CHLORIDE SERPL-SCNC: 107 MMOL/L (ref 95–110)
CO2 SERPL-SCNC: 23 MMOL/L (ref 23–29)
CREAT SERPL-MCNC: 1.2 MG/DL (ref 0.5–1.4)
DIFFERENTIAL METHOD BLD: ABNORMAL
EOSINOPHIL # BLD AUTO: 0.2 K/UL (ref 0–0.5)
EOSINOPHIL NFR BLD: 1.8 % (ref 0–8)
ERYTHROCYTE [DISTWIDTH] IN BLOOD BY AUTOMATED COUNT: 14 % (ref 11.5–14.5)
EST. GFR  (NO RACE VARIABLE): 48 ML/MIN/1.73 M^2
ESTIMATED AVG GLUCOSE: 88 MG/DL (ref 68–131)
GLUCOSE SERPL-MCNC: 78 MG/DL (ref 70–110)
HBA1C MFR BLD: 4.7 % (ref 4–5.6)
HCT VFR BLD AUTO: 44.3 % (ref 37–48.5)
HGB BLD-MCNC: 14.1 G/DL (ref 12–16)
IMM GRANULOCYTES # BLD AUTO: 0.03 K/UL (ref 0–0.04)
IMM GRANULOCYTES NFR BLD AUTO: 0.3 % (ref 0–0.5)
LYMPHOCYTES # BLD AUTO: 3.4 K/UL (ref 1–4.8)
LYMPHOCYTES NFR BLD: 34.8 % (ref 18–48)
MCH RBC QN AUTO: 28 PG (ref 27–31)
MCHC RBC AUTO-ENTMCNC: 31.8 G/DL (ref 32–36)
MCV RBC AUTO: 88 FL (ref 82–98)
MONOCYTES # BLD AUTO: 0.8 K/UL (ref 0.3–1)
MONOCYTES NFR BLD: 7.6 % (ref 4–15)
NEUTROPHILS # BLD AUTO: 5.4 K/UL (ref 1.8–7.7)
NEUTROPHILS NFR BLD: 55 % (ref 38–73)
NRBC BLD-RTO: 0 /100 WBC
PLATELET # BLD AUTO: 158 K/UL (ref 150–450)
PMV BLD AUTO: 11.1 FL (ref 9.2–12.9)
POTASSIUM SERPL-SCNC: 4.5 MMOL/L (ref 3.5–5.1)
PROT SERPL-MCNC: 7.5 G/DL (ref 6–8.4)
RBC # BLD AUTO: 5.04 M/UL (ref 4–5.4)
SODIUM SERPL-SCNC: 139 MMOL/L (ref 136–145)
TSH SERPL DL<=0.005 MIU/L-ACNC: 2.8 UIU/ML (ref 0.4–4)
WBC # BLD AUTO: 9.89 K/UL (ref 3.9–12.7)

## 2025-01-28 PROCEDURE — 3078F DIAST BP <80 MM HG: CPT | Mod: CPTII,S$GLB,,

## 2025-01-28 PROCEDURE — 3072F LOW RISK FOR RETINOPATHY: CPT | Mod: CPTII,S$GLB,,

## 2025-01-28 PROCEDURE — 1101F PT FALLS ASSESS-DOCD LE1/YR: CPT | Mod: CPTII,S$GLB,,

## 2025-01-28 PROCEDURE — 1126F AMNT PAIN NOTED NONE PRSNT: CPT | Mod: CPTII,S$GLB,,

## 2025-01-28 PROCEDURE — 3008F BODY MASS INDEX DOCD: CPT | Mod: CPTII,S$GLB,,

## 2025-01-28 PROCEDURE — 99214 OFFICE O/P EST MOD 30 MIN: CPT | Mod: S$GLB,,,

## 2025-01-28 PROCEDURE — G2211 COMPLEX E/M VISIT ADD ON: HCPCS | Mod: S$GLB,,,

## 2025-01-28 PROCEDURE — 85025 COMPLETE CBC W/AUTO DIFF WBC: CPT

## 2025-01-28 PROCEDURE — 80061 LIPID PANEL: CPT

## 2025-01-28 PROCEDURE — 99999 PR PBB SHADOW E&M-EST. PATIENT-LVL V: CPT | Mod: PBBFAC,,,

## 2025-01-28 PROCEDURE — 80053 COMPREHEN METABOLIC PANEL: CPT

## 2025-01-28 PROCEDURE — 83036 HEMOGLOBIN GLYCOSYLATED A1C: CPT

## 2025-01-28 PROCEDURE — 3074F SYST BP LT 130 MM HG: CPT | Mod: CPTII,S$GLB,,

## 2025-01-28 PROCEDURE — 36415 COLL VENOUS BLD VENIPUNCTURE: CPT

## 2025-01-28 PROCEDURE — 1159F MED LIST DOCD IN RCRD: CPT | Mod: CPTII,S$GLB,,

## 2025-01-28 PROCEDURE — 84443 ASSAY THYROID STIM HORMONE: CPT

## 2025-01-28 PROCEDURE — 3288F FALL RISK ASSESSMENT DOCD: CPT | Mod: CPTII,S$GLB,,

## 2025-01-28 PROCEDURE — 1160F RVW MEDS BY RX/DR IN RCRD: CPT | Mod: CPTII,S$GLB,,

## 2025-01-28 PROCEDURE — 4010F ACE/ARB THERAPY RXD/TAKEN: CPT | Mod: CPTII,S$GLB,,

## 2025-01-28 RX ORDER — LOSARTAN POTASSIUM 50 MG/1
50 TABLET ORAL DAILY
Qty: 90 TABLET | Refills: 1 | Status: SHIPPED | OUTPATIENT
Start: 2025-01-28

## 2025-01-28 RX ORDER — METFORMIN HYDROCHLORIDE 500 MG/1
500 TABLET ORAL 2 TIMES DAILY WITH MEALS
Qty: 180 TABLET | Refills: 0 | Status: SHIPPED | OUTPATIENT
Start: 2025-01-28

## 2025-01-28 NOTE — PROGRESS NOTES
Subjective     Patient ID: Hunter Cisneros is a 74 y.o. female.    Chief Complaint: Annual Exam        Patient is a 74 year old AA female with HTN, CKD stage 3, DM, hyporthyroidism, HLD, osteoporosis that presents to clinic today as a new patient to me, established with Dr. Javier, for c/o annual exam with requisition for labs. Patient states she is doing well overall. Does have question about her cholesterol medication - unsure if she should start taking it again. Patient denies any CP, SOB, Abdominal pain, n/v/d. BP stable today.     Had lost significant amount of weight intentionally through healthy diet. Continues to try and eat healthy. Does not take any NSAIDs.       Covid- declined today  Flu- UTD 2024  Colon CA screening- due- FIT kit ordered  Mammogram- due 2025; order placed  Diabetic eye exam- due 2025- will schedule herself.  Foot Exam- done today  DEXA- every 3 years; scheduled for 3/5/25      Review of Systems   All other systems reviewed and are negative.    Past Medical History:   Diagnosis Date    Cataract     CKD (chronic kidney disease) stage 3, GFR 30-59 ml/min 2014    Diabetes mellitus type II     GERD (gastroesophageal reflux disease)     Glaucoma (increased eye pressure)     HLD (hyperlipidemia)     HTN (hypertension)     Hypothyroidism     Morbid obesity 2014    Osteoporosis     Vitamin D deficiency 2014       Past Surgical History:   Procedure Laterality Date     SECTION, LOW TRANSVERSE         Family History   Problem Relation Name Age of Onset    Coronary artery disease Mother          premature, 57    No Known Problems Father      Cancer Sister      Breast cancer Sister  33    No Known Problems Maternal Aunt      Glaucoma Maternal Uncle      Diabetes Maternal Uncle      No Known Problems Paternal Aunt      No Known Problems Paternal Uncle      No Known Problems Maternal Grandmother      No Known Problems Maternal Grandfather      No Known Problems Paternal  Grandmother      No Known Problems Paternal Grandfather      No Known Problems Brother      Diabetes Other      Hypertension Other      Thyroid disease Other      Amblyopia Neg Hx      Blindness Neg Hx      Cataracts Neg Hx      Macular degeneration Neg Hx      Retinal detachment Neg Hx      Strabismus Neg Hx      Stroke Neg Hx         Social History     Socioeconomic History    Marital status:    Tobacco Use    Smoking status: Former     Types: Cigarettes    Smokeless tobacco: Never    Tobacco comments:     occasionally   Substance and Sexual Activity    Alcohol use: No    Drug use: No       Current Outpatient Medications   Medication Sig Dispense Refill    amLODIPine (NORVASC) 5 MG tablet Take 1 tablet by mouth once daily 90 tablet 3    aspirin (ECOTRIN) 81 MG EC tablet Take 81 mg by mouth once daily.      blood sugar diagnostic Strp To check BG 2 times daily, to use with insurance preferred meter 100 strip 0    blood-glucose meter kit To check BG 2 times daily, to use with insurance preferred meter 1 each 0    cholecalciferol, vitamin D3, (VITAMIN D3) 25 mcg (1,000 unit) capsule Take 1,000 Units by mouth once daily.      diclofenac sodium (VOLTAREN) 1 % Gel Apply 2 g topically 4 (four) times daily as needed (pain). 100 g 11    lancets Misc To check BG 2 times daily, to use with insurance preferred meter 100 each 0    latanoprost 0.005 % ophthalmic solution INSTILL 1 DROP INTO EACH EYE IN THE EVENING 5 mL 5    latanoprost 0.005 % ophthalmic solution Place 1 drop into both eyes every evening. 2.5 mL 5    levothyroxine (SYNTHROID) 88 MCG tablet Take 1 tablet (88 mcg total) by mouth before breakfast. 72 tablet 4    omega-3 fatty acids 1,000 mg Cap Take 1 capsule by mouth 3 (three) times daily.       semaglutide (OZEMPIC) 0.25 mg or 0.5 mg (2 mg/3 mL) pen injector Inject 0.5 mg into the skin every 7 days. 3 mL 11    timolol maleate 0.5% (TIMOPTIC-XE) 0.5 % SolG INSTILL 1 DROP INTO EACH EYE IN THE MORNING 5 mL 5  "   timolol maleate 0.5% (TIMOPTIC-XE) 0.5 % SolG INSTILL 1 DROP INTO EACH EYE IN THE MORNING 5 mL 5    wheat dextrin 3 gram/3.5 gram PwPk       losartan (COZAAR) 50 MG tablet Take 1 tablet (50 mg total) by mouth once daily. 90 tablet 1    metFORMIN (GLUCOPHAGE) 500 MG tablet Take 1 tablet (500 mg total) by mouth 2 (two) times daily with meals. 180 tablet 0    rosuvastatin (CRESTOR) 40 MG Tab TAKE 1 TABLET BY MOUTH ONCE DAILY IN THE EVENING 90 tablet 0     No current facility-administered medications for this visit.       Review of patient's allergies indicates:   Allergen Reactions    Lisinopril      cough         Objective     Vitals:    01/28/25 0951   BP: 122/64   Pulse: 68   Temp: 97.9 °F (36.6 °C)   TempSrc: Oral   SpO2: 99%   Weight: 74.4 kg (164 lb 0.4 oz)   Height: 5' 1" (1.549 m)   PainSc: 0-No pain      Physical Exam  Vitals and nursing note reviewed.   Constitutional:       General: She is not in acute distress.     Appearance: Normal appearance. She is not ill-appearing.   HENT:      Head: Normocephalic and atraumatic.      Right Ear: Tympanic membrane normal.      Left Ear: Tympanic membrane normal.      Nose: Nose normal.      Mouth/Throat:      Mouth: Mucous membranes are moist.      Pharynx: Oropharynx is clear.   Eyes:      General: No scleral icterus.        Right eye: No discharge.         Left eye: No discharge.      Extraocular Movements: Extraocular movements intact.      Conjunctiva/sclera: Conjunctivae normal.   Cardiovascular:      Rate and Rhythm: Normal rate and regular rhythm.      Pulses: Normal pulses.           Radial pulses are 2+ on the right side and 2+ on the left side.        Dorsalis pedis pulses are 2+ on the right side and 2+ on the left side.      Heart sounds: Normal heart sounds. No murmur heard.  Pulmonary:      Effort: Pulmonary effort is normal. No respiratory distress.      Breath sounds: Normal breath sounds.   Abdominal:      General: Abdomen is flat. Bowel sounds are " normal. There is no distension.      Palpations: Abdomen is soft. There is no mass.      Tenderness: There is no abdominal tenderness.   Musculoskeletal:         General: Normal range of motion.      Cervical back: Normal range of motion.      Right lower leg: No edema.      Left lower leg: No edema.      Comments: Uses cane    Feet:      Right foot:      Protective Sensation: 10 sites tested.  10 sites sensed.      Skin integrity: Skin integrity normal.      Toenail Condition: Right toenails are normal.      Left foot:      Protective Sensation: 10 sites tested.  10 sites sensed.      Skin integrity: Skin integrity normal.      Toenail Condition: Left toenails are normal.   Skin:     General: Skin is warm and dry.      Findings: No bruising or rash.   Neurological:      General: No focal deficit present.      Mental Status: She is alert and oriented to person, place, and time.   Psychiatric:         Mood and Affect: Mood normal.         Behavior: Behavior normal. Behavior is cooperative.         Cognition and Memory: Cognition and memory normal.          Assessment and Plan     1. Routine general medical examination at a health care facility  - Annual physical exam with requisition for labs; pt fasting today- will call with abnormal results.   -     CBC Auto Differential; Future; Expected date: 01/28/2025  -     Lipid Panel; Future; Expected date: 01/28/2025  -     Comprehensive Metabolic Panel; Future; Expected date: 01/28/2025  -     TSH; Future; Expected date: 01/28/2025  -     Hemoglobin A1C; Future; Expected date: 01/28/2025    2. Type 2 diabetes mellitus without complication, without long-term current use of insulin  - Chronic; stable on current treatment plan; follow up with PCP and Endocrinology.   - taking metformin and Ozempic; last A1C was 5.0 in 9/2024; continue medications; refills provided. Repeat labs today.  -     metFORMIN (GLUCOPHAGE) 500 MG tablet; Take 1 tablet (500 mg total) by mouth 2 (two)  times daily with meals.  Dispense: 180 tablet; Refill: 0  -     Comprehensive Metabolic Panel; Future; Expected date: 01/28/2025  -     Hemoglobin A1C; Future; Expected date: 01/28/2025    3. Essential hypertension  - Chronic; stable on current treatment plan; follow up with PCP  - BP stable today; continue medications, refills provided.   -     losartan (COZAAR) 50 MG tablet; Take 1 tablet (50 mg total) by mouth once daily.  Dispense: 90 tablet; Refill: 1  -     Comprehensive Metabolic Panel; Future; Expected date: 01/28/2025    4. Hypothyroidism, unspecified type  - Chronic; stable on current treatment plan- taking synthroid 88 mcg ; follow up with PCP and endocrinology.  -  Repeat labs today.   -     TSH; Future; Expected date: 01/28/2025    5. Mixed hyperlipidemia  - Chronic; stable- no longer treatment plan as she was told to stop taking the Rosuvastatin; repeat labs today; follow up with pcp.   -     Lipid Panel; Future; Expected date: 01/28/2025    6. Gastroesophageal reflux disease, unspecified whether esophagitis present  - Chronic; stable off medication at this time; follow up with PCP    7. Colon cancer screening  -     Fecal Immunochemical Test (iFOBT); Future; Expected date: 01/28/2025    8. Breast cancer screening by mammogram  -     Mammo Digital Screening Bilat w/ Servando; Future; Expected date: 01/28/2025             Follow up in about 6 months (around 7/28/2025).    37 minutes of total time spent on the encounter, which includes face to face time and non-face to face time preparing to see the patient (eg, review of tests), Obtaining and/or reviewing separately obtained history, Documenting clinical information in the electronic or other health record, Independently interpreting results (not separately reported) and communicating results to the patient/family/caregiver, or Care coordination (not separately reported).      Ciarra Perez, MSN, APRN, FNP-C  Family Medicine  Office #[phone]

## 2025-01-29 DIAGNOSIS — E78.2 MIXED HYPERLIPIDEMIA: ICD-10-CM

## 2025-01-29 DIAGNOSIS — E11.9 TYPE 2 DIABETES MELLITUS WITHOUT COMPLICATION, WITHOUT LONG-TERM CURRENT USE OF INSULIN: Primary | ICD-10-CM

## 2025-01-29 LAB
CHOLEST SERPL-MCNC: 207 MG/DL (ref 120–199)
CHOLEST/HDLC SERPL: 3.9 {RATIO} (ref 2–5)
HDLC SERPL-MCNC: 53 MG/DL (ref 40–75)
HDLC SERPL: 25.6 % (ref 20–50)
LDLC SERPL CALC-MCNC: 133.4 MG/DL (ref 63–159)
NONHDLC SERPL-MCNC: 154 MG/DL
TRIGL SERPL-MCNC: 103 MG/DL (ref 30–150)

## 2025-01-29 RX ORDER — ROSUVASTATIN CALCIUM 40 MG/1
40 TABLET, COATED ORAL NIGHTLY
Qty: 90 TABLET | Refills: 2 | Status: SHIPPED | OUTPATIENT
Start: 2025-01-29 | End: 2025-10-26

## 2025-01-29 NOTE — PROGRESS NOTES
Please call patient with results; she does not use portal.     Hello, I reviewed your labs from yesterday. Your A1C is now 4.7, which is very good. Your thyroid, blood count, electrolytes, and liver function tests were all within normal range. Your kidney function has dropped a bit but that seems to be chronic trend for you. Also your cholesterol has increased to 207. I discussed your cholesterol medication with Dr. Javier, and he is agreeable to you starting back on the statin medication for your cholesterol.   I will order repeat labs to be done in 3 months to monitor this as well as your kidney function. I will be sending a refill of the cholesterol medication to your pharmacy today..

## 2025-01-30 ENCOUNTER — TELEPHONE (OUTPATIENT)
Dept: FAMILY MEDICINE | Facility: CLINIC | Age: 75
End: 2025-01-30
Payer: COMMERCIAL

## 2025-01-30 NOTE — TELEPHONE ENCOUNTER
----- Message from MERLINE Chiang sent at 1/29/2025 12:53 PM CST -----  Please call patient with results; she does not use portal.     Hello, I reviewed your labs from yesterday. Your A1C is now 4.7, which is very good. Your thyroid, blood count, electrolytes, and liver function tests were all within normal range. Your kidney function has dropped a bit but that seems to be chronic trend for you. Also your cholesterol has increased to 207. I discussed your cholesterol medication with Dr. Javier, and he is agreeable to you starting back on the statin medication for your cholesterol.   I will order repeat labs to be done in 3 months to monitor this as well as your kidney function. I will be sending a refill of the cholesterol medication to your pharmacy today..

## 2025-01-31 ENCOUNTER — TELEPHONE (OUTPATIENT)
Dept: FAMILY MEDICINE | Facility: CLINIC | Age: 75
End: 2025-01-31
Payer: COMMERCIAL

## 2025-01-31 NOTE — TELEPHONE ENCOUNTER
Called pt and let her know about her lab results, also scheduled repeat labs in 3 months in [place].   Pt stated refills were already sent.

## 2025-02-04 ENCOUNTER — TELEPHONE (OUTPATIENT)
Dept: FAMILY MEDICINE | Facility: CLINIC | Age: 75
End: 2025-02-04
Payer: COMMERCIAL

## 2025-02-04 ENCOUNTER — LAB VISIT (OUTPATIENT)
Dept: LAB | Facility: HOSPITAL | Age: 75
End: 2025-02-04
Payer: COMMERCIAL

## 2025-02-04 DIAGNOSIS — Z12.11 COLON CANCER SCREENING: ICD-10-CM

## 2025-02-04 LAB — HEMOCCULT STL QL IA: NEGATIVE

## 2025-02-04 PROCEDURE — 82274 ASSAY TEST FOR BLOOD FECAL: CPT

## 2025-02-04 NOTE — TELEPHONE ENCOUNTER
----- Message from MERLINE Chiang sent at 2/4/2025  2:59 PM CST -----  Please call patient.    Your FIT kit (Fecal immunochemical test) came back negative. Repeat test in 1 year.

## 2025-02-04 NOTE — TELEPHONE ENCOUNTER
----- Message from Luzmaria sent at 1/31/2025  2:08 PM CST -----  Type:  Patient Returning Call    Who Called:pt  Who Left Message for Patient:Ciarra  Does the patient know what this is regarding?:returning call  Would the patient rather a call back or a response via [employer]? call  Best Call Back Number:[phone]  Additional Information:

## 2025-02-04 NOTE — TELEPHONE ENCOUNTER
Called pt to let her know about FIT kit results and to repeat test in 1 year.     Pt was concerned about specimen lab drop off appointment for today, but it was already completed.

## 2025-03-05 ENCOUNTER — TELEPHONE (OUTPATIENT)
Dept: ENDOCRINOLOGY | Facility: CLINIC | Age: 75
End: 2025-03-05
Payer: COMMERCIAL

## 2025-03-05 ENCOUNTER — RESULTS FOLLOW-UP (OUTPATIENT)
Dept: ENDOCRINOLOGY | Facility: CLINIC | Age: 75
End: 2025-03-05

## 2025-03-05 ENCOUNTER — HOSPITAL ENCOUNTER (OUTPATIENT)
Dept: RADIOLOGY | Facility: HOSPITAL | Age: 75
Discharge: HOME | End: 2025-03-05
Attending: NURSE PRACTITIONER
Payer: COMMERCIAL

## 2025-03-05 DIAGNOSIS — M81.0 OSTEOPOROSIS, UNSPECIFIED OSTEOPOROSIS TYPE, UNSPECIFIED PATHOLOGICAL FRACTURE PRESENCE: ICD-10-CM

## 2025-03-05 PROCEDURE — 77080 DXA BONE DENSITY AXIAL: CPT | Mod: TC,PO

## 2025-03-05 PROCEDURE — 77080 DXA BONE DENSITY AXIAL: CPT | Mod: 26,,, | Performed by: RADIOLOGY

## 2025-03-05 NOTE — TELEPHONE ENCOUNTER
Spoke with patient about her results the provider will be in touch after speaking with the provider that's out for the week.

## 2025-03-10 ENCOUNTER — TELEPHONE (OUTPATIENT)
Dept: ENDOCRINOLOGY | Facility: CLINIC | Age: 75
End: 2025-03-10
Payer: COMMERCIAL

## 2025-03-10 RX ORDER — SODIUM CHLORIDE 0.9 % (FLUSH) 0.9 %
10 SYRINGE (ML) INJECTION
Status: CANCELLED | OUTPATIENT
Start: 2025-03-10

## 2025-03-10 RX ORDER — HEPARIN 100 UNIT/ML
500 SYRINGE INTRAVENOUS
Status: CANCELLED | OUTPATIENT
Start: 2025-03-10

## 2025-03-10 RX ORDER — ZOLEDRONIC ACID 5 MG/100ML
5 INJECTION, SOLUTION INTRAVENOUS
Status: CANCELLED | OUTPATIENT
Start: 2025-03-10

## 2025-03-10 NOTE — PROGRESS NOTES
Reviewed case with Dr Villavicencio. No fracture. May have been a problem with 2023 scan causing bone density scores to read lower. Agrees with plan for reclast and stop Prolia.   Will call patient and let her know.

## 2025-03-11 RX ORDER — ZOLEDRONIC ACID 5 MG/100ML
5 INJECTION, SOLUTION INTRAVENOUS
OUTPATIENT
Start: 2025-03-11

## 2025-03-11 RX ORDER — HEPARIN 100 UNIT/ML
500 SYRINGE INTRAVENOUS
OUTPATIENT
Start: 2025-03-11

## 2025-03-11 RX ORDER — SODIUM CHLORIDE 0.9 % (FLUSH) 0.9 %
10 SYRINGE (ML) INJECTION
OUTPATIENT
Start: 2025-03-11

## 2025-03-14 ENCOUNTER — TELEPHONE (OUTPATIENT)
Dept: OPHTHALMOLOGY | Facility: CLINIC | Age: 75
End: 2025-03-14
Payer: COMMERCIAL

## 2025-03-17 ENCOUNTER — TELEPHONE (OUTPATIENT)
Dept: INFUSION THERAPY | Facility: HOSPITAL | Age: 75
End: 2025-03-17
Payer: COMMERCIAL

## 2025-03-17 NOTE — PROGRESS NOTES
"Received notice from infusion center that she is scheduled for Reclast injections through endocrinology NP but since privileges not here at [facility] and patient would prefer to come here, I have sign therapy plan orders for this.  Reviewed last note dated 10/23/2024:    "Osteoporosis  DXA Bone Density Scan ordered and to be completed 2/2025.  She was started on Prolia per PCP in March 2023.  Last dose in Oct 2024     Once complete will evaluate for possibility of stopping Prolia by giving one time dose of reclast and re-evaluating     Take Vitamin D 1000 iu every other day  Take calcium daily "    "

## 2025-03-18 ENCOUNTER — OFFICE VISIT (OUTPATIENT)
Dept: OPTOMETRY | Facility: CLINIC | Age: 75
End: 2025-03-18
Payer: COMMERCIAL

## 2025-03-18 DIAGNOSIS — E11.9 DIABETES MELLITUS TYPE 2 WITHOUT RETINOPATHY: Primary | ICD-10-CM

## 2025-03-18 DIAGNOSIS — H52.7 REFRACTIVE ERROR: ICD-10-CM

## 2025-03-18 DIAGNOSIS — H25.813 COMBINED FORMS OF AGE-RELATED CATARACT OF BOTH EYES: ICD-10-CM

## 2025-03-18 DIAGNOSIS — H40.053 BILATERAL OCULAR HYPERTENSION: ICD-10-CM

## 2025-03-18 PROCEDURE — 1159F MED LIST DOCD IN RCRD: CPT | Mod: CPTII,S$GLB,, | Performed by: OPTOMETRIST

## 2025-03-18 PROCEDURE — 1101F PT FALLS ASSESS-DOCD LE1/YR: CPT | Mod: CPTII,S$GLB,, | Performed by: OPTOMETRIST

## 2025-03-18 PROCEDURE — 4010F ACE/ARB THERAPY RXD/TAKEN: CPT | Mod: CPTII,S$GLB,, | Performed by: OPTOMETRIST

## 2025-03-18 PROCEDURE — 99999 PR PBB SHADOW E&M-EST. PATIENT-LVL III: CPT | Mod: PBBFAC,,, | Performed by: OPTOMETRIST

## 2025-03-18 PROCEDURE — 92014 COMPRE OPH EXAM EST PT 1/>: CPT | Mod: S$GLB,,, | Performed by: OPTOMETRIST

## 2025-03-18 PROCEDURE — 3288F FALL RISK ASSESSMENT DOCD: CPT | Mod: CPTII,S$GLB,, | Performed by: OPTOMETRIST

## 2025-03-18 PROCEDURE — 1126F AMNT PAIN NOTED NONE PRSNT: CPT | Mod: CPTII,S$GLB,, | Performed by: OPTOMETRIST

## 2025-03-18 PROCEDURE — 3044F HG A1C LEVEL LT 7.0%: CPT | Mod: CPTII,S$GLB,, | Performed by: OPTOMETRIST

## 2025-03-18 PROCEDURE — 92133 CPTRZD OPH DX IMG PST SGM ON: CPT | Mod: S$GLB,,, | Performed by: OPTOMETRIST

## 2025-03-18 PROCEDURE — 1160F RVW MEDS BY RX/DR IN RCRD: CPT | Mod: CPTII,S$GLB,, | Performed by: OPTOMETRIST

## 2025-03-18 RX ORDER — TIMOLOL MALEATE 5 MG/ML
SOLUTION OPHTHALMIC
Qty: 5 ML | Refills: 5 | Status: SHIPPED | OUTPATIENT
Start: 2025-03-18

## 2025-03-18 RX ORDER — LATANOPROST 50 UG/ML
1 SOLUTION/ DROPS OPHTHALMIC NIGHTLY
Qty: 7.5 ML | Refills: 3 | Status: SHIPPED | OUTPATIENT
Start: 2025-03-18

## 2025-03-18 NOTE — PROGRESS NOTES
HPI     Diabetic Eye Exam     Additional comments: DLE 2-2024           Comments    Pt here today for ocular health exam for DM.  Some issues with distance vision with glasses     (-)dry eyes  (-)headaches  (-)FOL/floaters    (+)latanoprost qhs, timolol qAM for OHTN    Hemoglobin A1C       Date                     Value               Ref Range             Status                01/28/2025               4.7                 4.0 - 5.6 %           Final                 09/27/2024               5.0                 4.0 - 5.6 %           Final                 03/11/2024               5.0                 4.0 - 5.6 %           Final                        Last edited by Ras Tse, OD on 3/18/2025  9:54 AM.            Assessment /Plan     For exam results, see Encounter Report.    Diabetes mellitus type 2 without retinopathy    Combined forms of age-related cataract of both eyes    Refractive error    Bilateral ocular hypertension      1. Diabetes mellitus type 2 without retinopathy (Primary)  Discussed possible ocular affects of uncontrolled blood sugar with patient.   Recommended continued strong blood sugar control and continued care with PCP.   Monitor yearly.     2. Combined forms of age-related cataract of both eyes  Moderate cortical cataracts OU, mild NS -- approaching VS  No improvement with refraction  Discussed options, referred to Dr. Pagan for cataract eval     3. Refractive error  No spec rx pending cataract eval     4. Bilateral ocular hypertension  TMax 24 / 24   / 519  Neg fam hx of glc    Updated OCT RNFL today -- stable from previous  OD no rnfl thinning  OS no rnfl thinning     Has been using Timoptic qAM / Latanoprost qPM OU  Refilled today  Consider trial d/c med in future after cataract extraction     - Posterior Segment OCT Optic Nerve- Both eyes

## 2025-04-04 ENCOUNTER — HOSPITAL ENCOUNTER (OUTPATIENT)
Dept: RADIOLOGY | Facility: HOSPITAL | Age: 75
Discharge: HOME | End: 2025-04-04
Payer: COMMERCIAL

## 2025-04-04 ENCOUNTER — RESULTS FOLLOW-UP (OUTPATIENT)
Dept: FAMILY MEDICINE | Facility: CLINIC | Age: 75
End: 2025-04-04

## 2025-04-04 VITALS — HEIGHT: 61 IN | BODY MASS INDEX: 30.96 KG/M2 | WEIGHT: 164 LBS

## 2025-04-04 DIAGNOSIS — Z12.31 BREAST CANCER SCREENING BY MAMMOGRAM: ICD-10-CM

## 2025-04-04 PROCEDURE — 77063 BREAST TOMOSYNTHESIS BI: CPT | Mod: TC,PO

## 2025-04-04 PROCEDURE — 77067 SCR MAMMO BI INCL CAD: CPT | Mod: 26,,, | Performed by: RADIOLOGY

## 2025-04-04 PROCEDURE — 77063 BREAST TOMOSYNTHESIS BI: CPT | Mod: 26,,, | Performed by: RADIOLOGY

## 2025-04-07 ENCOUNTER — TELEPHONE (OUTPATIENT)
Dept: FAMILY MEDICINE | Facility: CLINIC | Age: 75
End: 2025-04-07
Payer: COMMERCIAL

## 2025-04-07 NOTE — TELEPHONE ENCOUNTER
----- Message from MERLINE Chiang sent at 4/4/2025 10:10 AM CDT -----  Please call patient    Your recent mammogram is normal, recommend to repeat screening in 1 year     ----- Message -----  From: Interface, Rad Results In  Sent: 4/4/2025   9:56 AM CDT  To: MERLINE Chiang

## 2025-04-08 ENCOUNTER — TELEPHONE (OUTPATIENT)
Dept: FAMILY MEDICINE | Facility: CLINIC | Age: 75
End: 2025-04-08
Payer: COMMERCIAL

## 2025-04-09 ENCOUNTER — TELEPHONE (OUTPATIENT)
Dept: RHEUMATOLOGY | Facility: CLINIC | Age: 75
End: 2025-04-09
Payer: COMMERCIAL

## 2025-04-09 NOTE — TELEPHONE ENCOUNTER
Contacted patient for 3rd time, no answer. Left voicemail stating mammogram was normal and to call back with any concerns.   
complains of pain/discomfort

## 2025-04-11 ENCOUNTER — INFUSION (OUTPATIENT)
Dept: INFUSION THERAPY | Facility: HOSPITAL | Age: 75
End: 2025-04-11
Attending: INTERNAL MEDICINE
Payer: COMMERCIAL

## 2025-04-11 VITALS
OXYGEN SATURATION: 99 % | TEMPERATURE: 98 F | RESPIRATION RATE: 18 BRPM | SYSTOLIC BLOOD PRESSURE: 158 MMHG | HEART RATE: 73 BPM | DIASTOLIC BLOOD PRESSURE: 67 MMHG

## 2025-04-11 DIAGNOSIS — M81.0 OSTEOPOROSIS, UNSPECIFIED OSTEOPOROSIS TYPE, UNSPECIFIED PATHOLOGICAL FRACTURE PRESENCE: Primary | ICD-10-CM

## 2025-04-11 PROCEDURE — 25000003 PHARM REV CODE 250: Performed by: FAMILY MEDICINE

## 2025-04-11 PROCEDURE — 63600175 PHARM REV CODE 636 W HCPCS: Performed by: FAMILY MEDICINE

## 2025-04-11 PROCEDURE — 96374 THER/PROPH/DIAG INJ IV PUSH: CPT

## 2025-04-11 PROCEDURE — A4216 STERILE WATER/SALINE, 10 ML: HCPCS | Performed by: FAMILY MEDICINE

## 2025-04-11 RX ORDER — ZOLEDRONIC ACID 5 MG/100ML
5 INJECTION, SOLUTION INTRAVENOUS
OUTPATIENT
Start: 2025-04-11

## 2025-04-11 RX ORDER — SODIUM CHLORIDE 0.9 % (FLUSH) 0.9 %
10 SYRINGE (ML) INJECTION
OUTPATIENT
Start: 2025-04-11

## 2025-04-11 RX ORDER — ZOLEDRONIC ACID 5 MG/100ML
5 INJECTION, SOLUTION INTRAVENOUS
Status: COMPLETED | OUTPATIENT
Start: 2025-04-11 | End: 2025-04-11

## 2025-04-11 RX ORDER — HEPARIN 100 UNIT/ML
500 SYRINGE INTRAVENOUS
OUTPATIENT
Start: 2025-04-11

## 2025-04-11 RX ORDER — SODIUM CHLORIDE 0.9 % (FLUSH) 0.9 %
10 SYRINGE (ML) INJECTION
Status: DISCONTINUED | OUTPATIENT
Start: 2025-04-11 | End: 2025-04-11 | Stop reason: HOSPADM

## 2025-04-11 RX ADMIN — Medication 10 ML: at 10:04

## 2025-04-11 RX ADMIN — ZOLEDRONIC ACID 5 MG: 5 INJECTION, SOLUTION INTRAVENOUS at 10:04

## 2025-04-11 NOTE — PLAN OF CARE
Patient tolerated Reclast infusion well. No s/s adverse reaction. New med education given, patient verbalized understanding, all questions answered. PIV removed, patient ambulated out of clinic in NAD.

## 2025-04-30 ENCOUNTER — LAB VISIT (OUTPATIENT)
Dept: LAB | Facility: HOSPITAL | Age: 75
End: 2025-04-30
Payer: COMMERCIAL

## 2025-04-30 DIAGNOSIS — E78.2 MIXED HYPERLIPIDEMIA: ICD-10-CM

## 2025-04-30 DIAGNOSIS — E11.9 TYPE 2 DIABETES MELLITUS WITHOUT COMPLICATION, WITHOUT LONG-TERM CURRENT USE OF INSULIN: ICD-10-CM

## 2025-04-30 LAB
ALBUMIN SERPL BCP-MCNC: 2.8 G/DL (ref 3.5–5.2)
ALP SERPL-CCNC: 59 UNIT/L (ref 40–150)
ALT SERPL W/O P-5'-P-CCNC: 38 UNIT/L (ref 10–44)
ANION GAP (OHS): 9 MMOL/L (ref 8–16)
AST SERPL-CCNC: 44 UNIT/L (ref 11–45)
BILIRUB SERPL-MCNC: 0.4 MG/DL (ref 0.1–1)
BUN SERPL-MCNC: 17 MG/DL (ref 8–23)
CALCIUM SERPL-MCNC: 9.9 MG/DL (ref 8.7–10.5)
CHLORIDE SERPL-SCNC: 106 MMOL/L (ref 95–110)
CHOLEST SERPL-MCNC: 76 MG/DL (ref 120–199)
CHOLEST/HDLC SERPL: 2.5 {RATIO} (ref 2–5)
CO2 SERPL-SCNC: 27 MMOL/L (ref 23–29)
CREAT SERPL-MCNC: 1.5 MG/DL (ref 0.5–1.4)
EAG (OHS): 105 MG/DL (ref 68–131)
GFR SERPLBLD CREATININE-BSD FMLA CKD-EPI: 37 ML/MIN/1.73/M2
GLUCOSE SERPL-MCNC: 80 MG/DL (ref 70–110)
HBA1C MFR BLD: 5.3 % (ref 4–5.6)
HDLC SERPL-MCNC: 30 MG/DL (ref 40–75)
HDLC SERPL: 39.5 % (ref 20–50)
LDLC SERPL CALC-MCNC: 33.6 MG/DL (ref 63–159)
NONHDLC SERPL-MCNC: 46 MG/DL
POTASSIUM SERPL-SCNC: 3.5 MMOL/L (ref 3.5–5.1)
PROT SERPL-MCNC: 7.3 GM/DL (ref 6–8.4)
SODIUM SERPL-SCNC: 142 MMOL/L (ref 136–145)
TRIGL SERPL-MCNC: 62 MG/DL (ref 30–150)

## 2025-04-30 PROCEDURE — 80053 COMPREHEN METABOLIC PANEL: CPT

## 2025-04-30 PROCEDURE — 80061 LIPID PANEL: CPT

## 2025-04-30 PROCEDURE — 36415 COLL VENOUS BLD VENIPUNCTURE: CPT | Mod: PO

## 2025-04-30 PROCEDURE — 83036 HEMOGLOBIN GLYCOSYLATED A1C: CPT

## 2025-05-01 ENCOUNTER — TELEPHONE (OUTPATIENT)
Dept: FAMILY MEDICINE | Facility: CLINIC | Age: 75
End: 2025-05-01
Payer: COMMERCIAL

## 2025-05-01 ENCOUNTER — RESULTS FOLLOW-UP (OUTPATIENT)
Dept: FAMILY MEDICINE | Facility: CLINIC | Age: 75
End: 2025-05-01

## 2025-05-01 DIAGNOSIS — N18.32 STAGE 3B CHRONIC KIDNEY DISEASE: Primary | ICD-10-CM

## 2025-05-22 ENCOUNTER — LAB VISIT (OUTPATIENT)
Dept: LAB | Facility: HOSPITAL | Age: 75
End: 2025-05-22
Payer: COMMERCIAL

## 2025-05-22 DIAGNOSIS — N18.32 STAGE 3B CHRONIC KIDNEY DISEASE: ICD-10-CM

## 2025-05-22 LAB
ALBUMIN SERPL BCP-MCNC: 3.1 G/DL (ref 3.5–5.2)
ALP SERPL-CCNC: 70 UNIT/L (ref 40–150)
ALT SERPL W/O P-5'-P-CCNC: 57 UNIT/L (ref 10–44)
ANION GAP (OHS): 9 MMOL/L (ref 8–16)
AST SERPL-CCNC: 40 UNIT/L (ref 11–45)
BILIRUB SERPL-MCNC: 0.6 MG/DL (ref 0.1–1)
BUN SERPL-MCNC: 15 MG/DL (ref 8–23)
CALCIUM SERPL-MCNC: 9.6 MG/DL (ref 8.7–10.5)
CHLORIDE SERPL-SCNC: 111 MMOL/L (ref 95–110)
CO2 SERPL-SCNC: 22 MMOL/L (ref 23–29)
CREAT SERPL-MCNC: 1.5 MG/DL (ref 0.5–1.4)
GFR SERPLBLD CREATININE-BSD FMLA CKD-EPI: 37 ML/MIN/1.73/M2
GLUCOSE SERPL-MCNC: 99 MG/DL (ref 70–110)
POTASSIUM SERPL-SCNC: 3.2 MMOL/L (ref 3.5–5.1)
PROT SERPL-MCNC: 7.1 GM/DL (ref 6–8.4)
SODIUM SERPL-SCNC: 142 MMOL/L (ref 136–145)

## 2025-05-22 PROCEDURE — 36415 COLL VENOUS BLD VENIPUNCTURE: CPT | Mod: PO

## 2025-05-22 PROCEDURE — 80053 COMPREHEN METABOLIC PANEL: CPT

## 2025-05-22 NOTE — PROGRESS NOTES
Subjective:      Patient ID: Hunter Cisneros is a 74 y.o. female.    Chief Complaint:  Follow-up    History of Present Illness: Hunter Cisneros is a 74 y.o. woman with Type 2 DM, HTN, CKD stage 3, postablative hypothyroidism, vitamin d deficiency, dyslipidemia, osteopenia, GERD, and ocular hypertension presents for follow up of Type 2 DM. Previous patient of mine. Last visit with myself in 10/2024.      Denies changes in medical history since her last visit  Denies falls or fractures since her last visit     Wt Readings from Last 3 Encounters:   05/28/25 1004 74.8 kg (165 lb)   04/04/25 0942 74.4 kg (164 lb)   01/28/25 0951 74.4 kg (164 lb 0.4 oz)     With regards to postablative Hypothyroidism   (secondary to I-131 in 2004 (believed to be for compressive goiter)     She is on Levothyroxine 88 mcg daily (Per patient oral recall she is taking 6 days a week skipping Sunday)    States she is taking correctly and denies missing doses.  Takes thyroid medication properly without food first thing in the morning.  Takes on an empty stomach and waits 30-45 minutes before eating drinking or taking other medications.    Current Symptoms:   (+) Palpitations -every now and then  (+) slight tremors -every now and then  (-) mental fog  (+) constipation -on and off  (-) fatigue   (-) diarrhea     Lab Results   Component Value Date    TSH 2.800 01/28/2025    C9QHDPB 116 10/06/2004    FREET4 1.15 04/09/2024     With regards to the diabetes:    Diagnosed with Type 2 DM July 2011  Family History of Type 2 DM: maternal uncles  Known complications:  DKA/HHS: denies  RN: denies; up to date; March 2025; needs cataract surgery will have another appt in July 2025  PN: no longer on gabapentin  Nephropathy: denies  Gastroparesis: denies  CAD: denies    Current regimen:  Metformin 500 mg twice daily  Ozempic 0.5 mg weekly - Denies any negative GI symptoms and is tolerating well since last titration.    Missed doses - denies     Other  medications tried:  Invokana - too expensive    1 # times a day testing  Log reviewed: Oral recall normal range <110s    Hypoglycemic event- Denies and denies s/s of hypoglycemia   Knows how to correct with 15 grams of carbs- juice, coke, or a peppermint.     Dietary recall: eating less since last visit since addition of Ozempic    Eats 3 meals a day.   Snacks: little bit             Exercise: she is trying to walk more; has stationary bike    Education - last visit: politely declines    ACE/ARB: Taking    Denies personal history of pancreatitis or FH of thyroid cancer.     Lab Results   Component Value Date    HGBA1C 5.3 04/30/2025    HGBA1C 4.7 01/28/2025    HGBA1C 5.0 09/27/2024     Screening or Prevention Patient's value Goal Complete/Controlled?   HgA1C Testing and Control   Lab Results   Component Value Date    HGBA1C 5.3 04/30/2025      Annually/Less than 8% Yes   Lipid profile : 04/30/2025 Annually Yes   LDL control Lab Results   Component Value Date    LDLCALC 33.6 (L) 04/30/2025    Annually/Less than 100 mg/dl  Yes   Nephropathy screening Lab Results   Component Value Date    LABMICR 731.0 09/27/2024     Lab Results   Component Value Date    PROTEINUA Negative 04/25/2014    Annually No   Blood pressure BP Readings from Last 1 Encounters:   05/28/25 (!) 140/60    Less than 140/90 Yes   Dilated retinal exam : 03/18/2025 Annually No   Foot exam   : 01/28/2025 Annually No     With regards to obesity,     Weight loss as above  Change in diet    Body mass index is 31.18 kg/m².    Wt Readings from Last 3 Encounters:   05/28/25 1004 74.8 kg (165 lb)   04/04/25 0942 74.4 kg (164 lb)   01/28/25 0951 74.4 kg (164 lb 0.4 oz)     With regards to osteoporosis and Vit D deficiency,     Taking Vit D 1000 iu every other day   Latest Reference Range & Units 03/11/24 11:02   Vitamin D 30 - 96 ng/mL 61     Taking caltrate 1 tabs daily; calcium in diet  Denies recent fractures or falls     She was started on Prolia per PCP in  March 2023.  Last dose in Oct 2024  April 2025 reclast x1    Social alcohol   Denies tobacco use    She is walking for exercise and working with PT for arthritis  She is using cane today.  No formal exercise.   Denies need for dental work-has dentures     3/5/2025 DXA   COMPARISON:  None.     FINDINGS:  LUMBAR SPINE:     Bone mineral density in the lumbar spine from L1 through L4 is 0.901 gm/cm2.     The T-score is -1.3 (standard deviations of Young Adult mean).     The Z-score is 0.9 (standard deviations of Age Matched mean).     The WHO classification is osteopenia.     LEFT HIP:     Bone mineral density in the femoral neck is 0.632 gm/cm2.     The T-score is -2.0 (standard deviations of Young Adult mean).     The Z-score is 0 (standard deviations of Age Matched mean).     The WHO classification is osteopenia.     Impression:     Osteopenia of the lumbar spine and the left hip.    Vit D, 25-Hydroxy   Date Value Ref Range Status   03/11/2024 61 30 - 96 ng/mL Final     Comment:     Vitamin D deficiency.........<10 ng/mL                              Vitamin D insufficiency......10-29 ng/mL       Vitamin D sufficiency........> or equal to 30 ng/mL  Vitamin D toxicity............>100 ng/mL       With regards to hypertension,     She is taking losartan and amlodipine missed dose today  BP at goal today    BP Readings from Last 3 Encounters:   05/28/25 (!) 140/60   04/11/25 (!) 158/67   01/28/25 122/64     With regards to dyslipidemia,      She is on crestor 40 mg qhs- on hold per PCP as she recently had US abdomen due to elevated LFTs    Statin: Stopped taking per PCP in preparation of liver ultrasound on 10/17/2027  US ABDOMEN LIMITED_LIVER    We checked fibroscan in Nov 2024 -normal   Recommendation was to consult hepatology if LFTs remain elevated.      CLINICAL HISTORY:  Elevation of levels of liver transaminase levels     TECHNIQUE:  Right upper quadrant ultrasound     COMPARISON:  10/21/2022     FINDINGS:  Liver  maintains normal echogenicity.  There is a 1.5 x 1.9 cm cyst in left lobe of the liver.  There is no biliary duct dilatation..  Hepatopedal flow present in main portal vein.     There is a 3 cm gallstone.  There is no gallbladder wall thickening or pericholecystic edema.  Common duct diameter of 4 mm is normal.     Visualized pancreas is unremarkable with bowel gas obscuring portions of pancreas. .     Impression:     Cholelithiasis without evidence of acute cholecystitis     1.9 cm hepatic cyst    Results for LEXII WALDROP (MRN [id number]) as of 8/17/2021 08:58  Lab Results   Component Value Date    LDLCALC 33.6 (L) 04/30/2025     FIB-4 Calculation: [id number] at 10/23/2024 10:13 AM     FIB-4 below 1.30 is considered as low-risk for advanced fibrosis  FIB-4 over 2.67 is considered as high-risk for advanced fibrosis  FIB-4 values between 1.30 and 2.67 are considered as intermediate-risk of advanced fibrosis for ages 36-64.     For ages > 64 the cut-off for low-risk goes to < 2.  This is a screening tool and clinical judgement should be used in the interpretation of these results.    Review of Systems   Constitutional:  Negative for fatigue and unexpected weight change.   Eyes:  Negative for visual disturbance.   Gastrointestinal:  Positive for constipation.   Endocrine: Negative for polydipsia, polyphagia and polyuria.   Neurological:  Positive for numbness (in feet). Negative for dizziness, tremors and weakness.     Objective:      Vitals:    05/28/25 1004   BP: (!) 140/60   Pulse: 88     Physical Exam  Vitals reviewed.   Constitutional:       Appearance: She is well-developed.   Neck:      Thyroid: No thyromegaly.   Pulmonary:      Effort: Pulmonary effort is normal.   Abdominal:      Palpations: Abdomen is soft.   Musculoskeletal:      Comments: Using cane     Denies injection site edema or erythema. No lipo hypertropthy or atrophy  Due for foot exam in Jan 2026    Lab Review:   Lab Results   Component  Value Date    HGBA1C 5.3 04/30/2025     Lab Results   Component Value Date    TSH 2.800 01/28/2025       Assessment:     1. Osteoporosis, unspecified osteoporosis type, unspecified pathological fracture presence        2. Postablative hypothyroidism        3. Type 2 diabetes mellitus without complication, without long-term current use of insulin  Microalbumin/Creatinine Ratio, Urine    Comprehensive Metabolic Panel    metFORMIN (GLUCOPHAGE) 500 MG tablet    CANCELED: Hemoglobin A1C      4. Vitamin D deficiency  Vitamin D      5. Mixed hyperlipidemia        6. Essential hypertension  losartan (COZAAR) 50 MG tablet      7. BMI 35.0-35.9,adult        8. Severe obesity (BMI 35.0-35.9 with comorbidity)        9. Diabetes mellitus with stage 3 chronic kidney disease        10. Elevated LFTs  Ambulatory referral/consult to Hepatology        Plan:     1. Osteoporosis, unspecified osteoporosis type, unspecified pathological fracture presence        2. Postablative hypothyroidism        3. Type 2 diabetes mellitus without complication, without long-term current use of insulin  Microalbumin/Creatinine Ratio, Urine    Comprehensive Metabolic Panel    metFORMIN (GLUCOPHAGE) 500 MG tablet    CANCELED: Hemoglobin A1C      4. Vitamin D deficiency  Vitamin D      5. Mixed hyperlipidemia        6. Essential hypertension  losartan (COZAAR) 50 MG tablet      7. BMI 35.0-35.9,adult        8. Severe obesity (BMI 35.0-35.9 with comorbidity)        9. Diabetes mellitus with stage 3 chronic kidney disease        10. Elevated LFTs  Ambulatory referral/consult to Hepatology        Osteoporosis  Check Vitamin D  She is on vitamin D every other day  RDA of calcium is 2215-7472 mg daily, preferably from food  Avoid falls     She was started on Prolia per PCP in March 2023.  Last dose in Oct 2024  April 2025 reclast x1    Postablative hypothyroidism  Last level at goal  Continue Levothyroxine 88 mcg 6 days weekly  Check annually     Type 2  diabetes mellitus without complication  A1c at goal   Check A1c every 6 months     Medication Changes   Continue Metformin 500 mg two tabs daily  Continue Ozempic 0.5 mg weekly    If you have hypoglycemia blood sugar less than 70, then decrease Metformin to one tab daily    Vitamin D deficiency  Update with next labs  Taking every other day    Mixed hyperlipidemia  LDL at goal  Continue statin    Essential hypertension  BP slightly above goal as she did not take medications yet today  Encouraged to take    BMI 35.0-35.9,adult  Excellent weight loss with recent diet changes    Severe obesity (BMI 35.0-35.9 with comorbidity)  On GLP1 therapy which should aid in weight loss     Diabetes mellitus with stage 3 chronic kidney disease  As above     Visit today included increased complexity associated with the care of episodic problems diabetes, hypothyroidism, dyslipidemia, hypertension, osteoporosis addressed and managing longitudinal care of the patient due to serious managed problems diabetes, hypothyroidism, dyslipidemia, hypertension, osteoporosis     Follow up in about 6 months (around 11/28/2025).  Labs in June 2025

## 2025-05-23 ENCOUNTER — RESULTS FOLLOW-UP (OUTPATIENT)
Dept: FAMILY MEDICINE | Facility: CLINIC | Age: 75
End: 2025-05-23

## 2025-05-23 ENCOUNTER — TELEPHONE (OUTPATIENT)
Dept: FAMILY MEDICINE | Facility: CLINIC | Age: 75
End: 2025-05-23
Payer: COMMERCIAL

## 2025-05-23 DIAGNOSIS — N18.32 STAGE 3B CHRONIC KIDNEY DISEASE: ICD-10-CM

## 2025-05-23 DIAGNOSIS — E03.9 HYPOTHYROIDISM, UNSPECIFIED TYPE: ICD-10-CM

## 2025-05-23 DIAGNOSIS — E87.6 HYPOKALEMIA: ICD-10-CM

## 2025-05-23 DIAGNOSIS — M85.88 OTHER SPECIFIED DISORDERS OF BONE DENSITY AND STRUCTURE, OTHER SITE: ICD-10-CM

## 2025-05-23 DIAGNOSIS — I10 ESSENTIAL HYPERTENSION: ICD-10-CM

## 2025-05-23 DIAGNOSIS — E11.9 TYPE 2 DIABETES MELLITUS WITHOUT COMPLICATION, WITHOUT LONG-TERM CURRENT USE OF INSULIN: Primary | ICD-10-CM

## 2025-05-23 NOTE — TELEPHONE ENCOUNTER
Please tell patient  I reviewed recent labs.   I saw that Ciarra had her do some labs recently for kidney function.  Kidney test is still some low and also her potassium was low too.  She used to be on a medicine called hydrochlorothiazide that we had taken off in the past (I just want to make sure she is off of this medication and not taking it again because this can also lower potassium.  If she is not taking this any issues with recent vomiting or diarrhea that could have lowered her potassium?  I have listed  that her blood pressure medicines are amlodipine 5 mg daily and losartan 50 mg daily.  If able to check your blood pressure at home?  I would like to see her back in a month in the office to further evaluate.  Please have her do the following labs in a month before one-month follow up.         Confirmed next appointment 1mfu

## 2025-05-23 NOTE — TELEPHONE ENCOUNTER
Please tell patient  I reviewed recent labs.   I saw that Ciarra had her do some labs recently for kidney function.  Kidney test is still some low and also her potassium was low too.  She used to be on a medicine called hydrochlorothiazide that we had taken off in the past (I just want to make sure she is off of this medication and not taking it again because this can also lower potassium.  If she is not taking this any issues with recent vomiting or diarrhea that could have lowered her potassium?  I have listed  that her blood pressure medicines are amlodipine 5 mg daily and losartan 50 mg daily.  If able to check your blood pressure at home?  I would like to see her back in a month in the office to further evaluate.  Please have her do the following labs in a month before one-month follow up.      Orders Placed This Encounter   Procedures    CBC Auto Differential    Comprehensive Metabolic Panel    TSH    Microalbumin/Creatinine Ratio, Urine    Vitamin D

## 2025-05-27 PROBLEM — M85.80 OSTEOPENIA: Status: RESOLVED | Noted: 2018-04-18 | Resolved: 2025-05-27

## 2025-05-27 PROBLEM — E11.22 DIABETES MELLITUS WITH STAGE 3 CHRONIC KIDNEY DISEASE: Status: ACTIVE | Noted: 2025-05-27

## 2025-05-27 PROBLEM — N18.30 DIABETES MELLITUS WITH STAGE 3 CHRONIC KIDNEY DISEASE: Status: ACTIVE | Noted: 2025-05-27

## 2025-05-27 PROBLEM — E66.01 SEVERE OBESITY (BMI 35.0-35.9 WITH COMORBIDITY): Status: ACTIVE | Noted: 2025-05-27

## 2025-05-27 NOTE — ASSESSMENT & PLAN NOTE
Check Vitamin D  She is on vitamin D every other day  RDA of calcium is 7193-7672 mg daily, preferably from food  Avoid falls     She was started on Prolia per PCP in March 2023.  Last dose in Oct 2024  April 2025 reclast x1

## 2025-05-27 NOTE — ASSESSMENT & PLAN NOTE
A1c at goal   Check A1c every 6 months     Medication Changes   Continue Metformin 500 mg two tabs daily  Continue Ozempic 0.5 mg weekly    If you have hypoglycemia blood sugar less than 70, then decrease Metformin to one tab daily

## 2025-05-28 ENCOUNTER — OFFICE VISIT (OUTPATIENT)
Dept: ENDOCRINOLOGY | Facility: CLINIC | Age: 75
End: 2025-05-28
Payer: COMMERCIAL

## 2025-05-28 VITALS
HEIGHT: 61 IN | BODY MASS INDEX: 31.15 KG/M2 | SYSTOLIC BLOOD PRESSURE: 140 MMHG | WEIGHT: 165 LBS | DIASTOLIC BLOOD PRESSURE: 60 MMHG | HEART RATE: 88 BPM | OXYGEN SATURATION: 100 %

## 2025-05-28 DIAGNOSIS — M81.0 OSTEOPOROSIS, UNSPECIFIED OSTEOPOROSIS TYPE, UNSPECIFIED PATHOLOGICAL FRACTURE PRESENCE: Primary | ICD-10-CM

## 2025-05-28 DIAGNOSIS — R79.89 ELEVATED LFTS: ICD-10-CM

## 2025-05-28 DIAGNOSIS — E55.9 VITAMIN D DEFICIENCY: ICD-10-CM

## 2025-05-28 DIAGNOSIS — N18.30 DIABETES MELLITUS WITH STAGE 3 CHRONIC KIDNEY DISEASE: ICD-10-CM

## 2025-05-28 DIAGNOSIS — I10 ESSENTIAL HYPERTENSION: ICD-10-CM

## 2025-05-28 DIAGNOSIS — E11.22 DIABETES MELLITUS WITH STAGE 3 CHRONIC KIDNEY DISEASE: ICD-10-CM

## 2025-05-28 DIAGNOSIS — E11.9 TYPE 2 DIABETES MELLITUS WITHOUT COMPLICATION, WITHOUT LONG-TERM CURRENT USE OF INSULIN: ICD-10-CM

## 2025-05-28 DIAGNOSIS — E89.0 POSTABLATIVE HYPOTHYROIDISM: ICD-10-CM

## 2025-05-28 DIAGNOSIS — E78.2 MIXED HYPERLIPIDEMIA: ICD-10-CM

## 2025-05-28 DIAGNOSIS — E66.01 SEVERE OBESITY (BMI 35.0-35.9 WITH COMORBIDITY): ICD-10-CM

## 2025-05-28 PROCEDURE — 99999 PR PBB SHADOW E&M-EST. PATIENT-LVL V: CPT | Mod: PBBFAC,,, | Performed by: NURSE PRACTITIONER

## 2025-05-28 RX ORDER — METFORMIN HYDROCHLORIDE 500 MG/1
500 TABLET ORAL 2 TIMES DAILY WITH MEALS
Qty: 180 TABLET | Refills: 3 | Status: SHIPPED | OUTPATIENT
Start: 2025-05-28 | End: 2026-05-28

## 2025-05-28 RX ORDER — LOSARTAN POTASSIUM 50 MG/1
50 TABLET ORAL DAILY
Qty: 90 TABLET | Refills: 3 | Status: SHIPPED | OUTPATIENT
Start: 2025-05-28

## 2025-05-28 NOTE — PATIENT INSTRUCTIONS
Hypothyroidism  --TSH at goal.     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  Last dose in Oct 2024  April 2025 reclast x1  Check bone density in 4/2027    Elevated liver function    Consult hepatology     Snacks can be an important part of a balanced, healthy meal plan. They allow you to eat more frequently, feeling full and satisfied throughout the day. Also, they allow you to spread carbohydrates evenly, which may stabilize blood sugars.  Plus, snacks are enjoyable!     The amount of carbohydrate needed at snacks varies. Generally, about 15-30 grams of carbohydrate per snack is recommended.  Below you will find some tasty treats.       0-5 gm carb  Crystal Light  Vitamin Water Zero  Herbal tea, unsweetened  2 tsp peanut butter on celery  1./2 cup sugar-free jell-o  1 sugar-free popsicle  ¼ cup blueberries  8oz Blue [place] unsweetened almond milk  5 baby carrots & celery sticks, cucumbers, bell peppers dipped in ¼ cup salsa, 2Tbsp light ranch dressing or 2Tbsp plain Greek yogurt  10 Goldfish crackers  ½ oz low-fat cheese or string cheese  1 closed handful of nuts, unsalted  1 Tbsp of sunflower seeds, unsalted  1 cup Smart Pop popcorn  1 whole grain brown rice cake        15 gm carb  1 small piece of fruit or ½ banana or 1/2 cup lite canned fruit  3 silva cracker squares  3 cups Smart Pop popcorn, top spray butter, Bond lite salt or cinnamon and Truvia  5 Vanilla Wafers  ½ cup low fat, no added sugar ice cream or frozen yogurt (Blue bell, Blue Bunny, Weight Watchers, Skinny Cow)  ½ turkey, ham, or chicken sandwich  ½ c fruit with ½ c Cottage cheese  4-6 unsalted wheat crackers with 1 oz low fat cheese or 1 tbsp peanut butter   30-45 goldfish crackers (depending on flavor)   7-8  Mormon mini brown rice cakes (caramel, apple cinnamon, chocolate)   12 Mormon mini brown rice cakes (cheddar, bbq, ranch)   1/3 cup hummus dip with raw veg  1/2 whole wheat jadyn, 1Tbsp hummus  Mini Pizza (1/2 whole wheat English muffin, low-fat  cheese, tomato sauce)  100 calorie snack pack (Oreo, Chips Ahoy, Ritz Mix, Baked Cheetos)  4-6 oz. light or Greek Style yogurt (Chobani, Yoplait, Okios, [place])  ½ cup sugar-free pudding    6 in. wheat tortilla or jadyn oven toasted chips (topped with spray butter flavoring, cinnamon, Truvia OR spray butter, garlic powder, chili powder)   18 BBQ Popchips (available at [employer], [employer], Fresh Market)       Eating the Right Number of Calories (8010-5317 Guidelines)    Calories are a measure of the energy you get from food. If you eat more calories than you use, you will gain weight. If you eat fewer calories than you use, you will lose weight. Below are tables that give the number of calories needed each day. Look for your gender, age, and activity level. If you stick to this number, you should neither gain nor lose weight. Note that this is an estimated number of calories.* Your exact number may differ.    Women  Age in years Low activity level (calories/day) Moderate activity level (calories/day) High activity level (calories/day)   19 to 30 1,800-2,000 2,000-2,200 2,400   31 to 50 1,800 2,000 2,200   51 and older 1,600 1,800 2,000-2,200      Men  Age in years Low activity level  (calories/day) Moderate activity level (calories/day) High activity level (calories/day)   19 to 30 2,400-2,600 2,600-2,800 3,000   31 to 50 2,200-2,400 2,400-2,600 2,800-3,000   51 and older 2,000-2,200 2,200-2,400 2,400-2,800     Activity levels defined  Low. Only light physical activity such as that done during typical daily life.  Moderate. Light physical activity done during typical daily life AND physical activity equal to walking about 1.5 to 3 miles a day at 3 to 4 miles per  hour.  High. Light physical activity done during typical daily life AND physical activity equal to walking more than 3 miles a day at 3 to 4 miles per hour.  *From Dietary Guidelines for Americans, 7260-7348, [place] Department of Health and Human Services.    Eat less fat  A gram of fat has almost 2.5 times the calories of a gram of protein or carbohydrates. Try to balance your food choices so that only 20% to 35% of your calories comes from total fat. This means an average of 2½ to 3½ grams of fat for each 100 calories you eat.    Eat more fiber  High-fiber foods are digested more slowly than low-fiber foods, so you feel full longer. Try to get at least 25 grams of fiber each day for a 2000 calorie diet. Foods high in fiber include:  Vegetables and fruits  Whole-grain or bran breads, pastas, and cereals  Legumes (beans) and peas  As you begin to eat more fiber, be sure to drink plenty of water to keep your digestive system working smoothly.    Tips  Do's and don'ts include:   Dont skip meals. This often leads to overeating later on. Its best to spread your eating throughout the day.  Eat a variety of foods, not just a few favorites.  If you find yourself eating when youre not hungry, ask yourself why. Many of us eat when were bored, stressed, or just to be polite. Listen to your body. If youre not hungry, get busy doing something else instead of eating.  Eat slower, shooting for 20 to 30 minutes for each meal. It takes 20 minutes for your stomach to tell your brain that its full. Slow eaters tend to eat less and are still satisfied, while fast eaters may tend to be overeaters.   Pay attention to what you eat. Dont read or watch TV during your meal.     ---------------------------------------------------------------------------------------------------------------------------------------------------    Losing Weight for Heart Health  Excess weight is a major risk factor for heart disease. Losing weight has many  benefits including lowering your blood pressure, improving your cholesterol level, and decreasing your risk for diseases such as diabetes and heart disease. It may help keep your arteries open so that your heart can get the oxygen-rich blood it needs. All in all, losing weight makes you healthier.       Exercise with a friend. When activity is fun, you're more likely to stick with it.     Calories and weight loss  Calories are the fuel your body burns for energy. You get the calories you need from the food you eat. For healthy weight loss, women should eat at least 1,200 calories a day, men at least 1,500.  When you eat more calories than you need, your body stores the extra calories as fat. One pound of fat equals 3,500 calories.  To lose weight, try to reduce your total calorie intake by 500 calories. To do this, eat 250 calories less each day. Add activity to burn the other 250 calories. Walking 2.5 miles burns about 250 calories. Other more intense activities can burn more calories in the time you spend doing them, such as swimming and running. It is important to understand that reducing calorie intake is much more effective at weight loss than is exercise.  Eat a variety of healthy foods to get the nutrients you need.    Tips for losing weight  Drink 8 to 10 glasses of water a day.  Dont skip meals. Instead, eat smaller portions.  Eat your meals earlier in the day.  Cut out sugary drinks such as soda and fruit juices.  Make your later meals lighter than your earlier meals.     What can exercise help?  Blood sugar. Regular exercise improves blood sugar control by helping your body use insulin.  Mental and emotional health. Physical activity relieves stress and helps you sleep better.  Heart health. With regular exercise, you can reduce your risk of heart disease and high blood pressure. You can also improve your cholesterol and triglyceride levels.  Weight. Exercise helps you lose fat, gain muscle, and control  your weight.  Health of blood vessels and nerves. Activity helps lower blood sugar. This helps prevent damage to blood vessels and nerves that can cause problems with your brain, eyes, feet, and legs.  Finances. If you manage your blood sugar, you may spend less on medical care.    2 types of exercise  Two types of exercise help your body use blood sugar. Experts advise both types of exercise for people with diabetes:  Aerobic exercise. This is a rhythmic, repeated, continued movement of large muscle groups for at least 10 minutes at a time. You should do this about 30 minutes a day on most days of the week. Examples include walking, bicycling, jogging, swimming, water aerobics, and many sports.  Resistance exercise (strength training). This type of exercise uses muscles to move weight or work against resistance. You can do it with free weights, machines, resistance tubing, or your own body weight. Adults with diabetes should aim for 2 to 3 sessions of resistance exercise each week. Its best to skip a day in between.    A goal to shoot for  Your main goal is to become more active. Even a little bit helps. Choose an activity that you like. Walking is one great form of exercise that everyone can do. Talk to your healthcare provider about any limits you may have before starting with an exercise program. Then aim for 150 minutes a week of physical activity. Dont let more than 2 days go by without exercise. When you are sitting for long periods of time, get up for short sessions of light activity every 30 minutes.    Getting activity into your day  Being more active doesnt have to be hard work. Try these to get more activity into your day:  Take the stairs instead of the elevator  Garden, do housework, and yard work  Choose a parking space farther from the store  Walk to talk to a co-worker instead of calling  Take a 10-minute walk around the block at lunch  Walk to a bus stop a little farther from your home or  office  Walk the dog after dinner     ---------------------------------------------------------------------------------------------------------------------------------------------------    Reading Food Labels  Look for the Nutrition Facts label on packaged foods. Reading labels is a big step toward eating healthier. The tips below help you know what to look for.    Serving size. Read this closely because the package, jar, or can may contain more than 1 serving. This is how to measure 1 serving of the food in the package. If you eat more than 1 serving, you get more of everything on the label -- including fat, cholesterol, and calories.  Total fat. This tells you how many grams (g) of fat are in 1 serving. Fat is high in calories. A healthy goal is to have less than 25% of your daily calories come from fat.  Saturated fat. This tells you how much saturated fat is in 1 serving. Saturated fat raises your cholesterol the most. Look for foods that have little or no saturated fat.  Trans fat. This tells you how much trans fat is in 1 serving. Even a small amount of trans fat can harm your health. Choose foods that have no trans fat.  Cholesterol. This tells you how much cholesterol is in 1 serving. For many years, it had been recommended to eat less than 300 milligrams (mg) of cholesterol a day. New guidelines have removed this limitation as cholesterol has been recently shown to not raise blood cholesterol levels as significantly as previously thought. However, many foods high in cholesterol are also high in saturated fat. It is recommended to limit saturated fat in your diet.  Calories from fat. This number tells you how many calories from fat are in 1 serving (there are 9 calories per gram of fat). Look for foods with few calories from fat.  % Daily value. The higher the number, the more 1 serving has of that nutrient. Look for foods that have low numbers for total fat, saturated fat, cholesterol, and  sodium.  Sodium. This tells you how much sodium (salt) is in 1 serving. Choose foods with low numbers for sodium.  Dietary fiber. This number tells you how much fiber is in 1 serving. Foods that are high in fiber can help you feel full. They can also be good for your heart and digestion. The recommended daily amount of fiber is 25 grams for women and 38 grams for men. After age 50, your daily fiber needs drop to 21 grams for women and 30 grams for men.       ---------------------------------------------------------------------------------------------------------------------------------------------------    Understanding Carbohydrates, Fats, and Protein  Food is a source of fuel and nourishment for your body. Its also a source of pleasure. Having diabetes doesnt mean you have to eat special foods or give up desserts. Instead, your dietitian can show you how to plan meals to suit your body. To start, learn how different foods affect blood sugar.    Carbohydrates  Carbohydrates are the main source of fuel for the body. Carbohydrates raise blood sugar. Many people think carbohydrates are only found in pasta or bread. But carbohydrates are actually in many kinds of foods:  Sugars occur naturally in foods such as fruit, milk, honey, and molasses. Sugars can also be added to many foods, from cereals and yogurt to candy and desserts. Sugars raise blood sugar.  Starches are found in bread, cereals, pasta, and dried beans. Theyre also found in corn, peas, potatoes, yam, acorn squash, and butternut squash. Starches also raise blood sugar.   Fiber is found in foods such as vegetables, fruits, beans, and whole grains. Unlike other carbs, fiber isnt digested or absorbed. So it doesnt raise blood sugar. In fact, fiber can help keep blood sugar from rising too fast. It also helps keep blood cholesterol at a healthy level.  Did you know?  Even though carbohydrates raise blood sugar, its best to have some in every meal. They  are an important part of a healthy diet.     Fat  Fat is an energy source that can be stored until needed. Fat does not raise blood sugar. However, it can raise blood cholesterol, increasing the risk of heart disease. Fat is also high in calories, which can cause weight gain. Not all types of fat are the same.    More Healthy:  Monounsaturated fats are mostly found in vegetable oils, such as olive, canola, and peanut oils. They are also found in avocados and some nuts. Monounsaturated fats are healthy for your heart. Thats because they lower LDL (unhealthy) cholesterol.  Polyunsaturated fats are mostly found in vegetable oils, such as corn, safflower, and soybean oils. They are also found in some seeds, nuts, and fish. Polyunsaturated fats lower LDL (unhealthy) cholesterol. So, choosing them instead of saturated fats is healthy for your heart. Certain unsaturated fats can help lower triglycerides.     Less Healthy:  Saturated fats are found in animal products, such as meat, poultry, whole milk, lard, and butter. Saturated fats raise LDL cholesterol and are not healthy for your heart.  Hydrogenated oils and trans fats are formed when vegetable oils are processed into solid fats. They are found in many processed foods. Hydrogenated oils and trans fats raise LDL cholesterol and lower HDL (healthy) cholesterol. They are not healthy for your heart.    Protein  Protein helps the body build and repair muscle and other tissue. Protein has little or no effect on blood sugar. However, many foods that contain protein also contain saturated fat. By choosing low-fat protein sources, you can get the benefits of protein without the extra fat:  Plant protein is found in dry beans and peas, nuts, and soy products, such as tofu and soymilk. These sources tend to be cholesterol-free and low in saturated fat.  Animal protein is found in fish, poultry, meat, cheese, milk, and eggs. These contain cholesterol and can be high in saturated  fat. Aim for lean, lower-fat choices.    ---------------------------------------------------------------------------------------------------------------------------------------------------    Understanding Carbohydrates    A car needs the right type of fuel to run. And you need the right kind of food to function. To keep your energy level up, your body needs food that has carbohydrates. But carbohydrates raise blood sugar levels higher and faster than other kinds of food. Your dietitian will work with you to figure out the amount of carbohydrates you need.    Starches  Starches are found in grains, some vegetables, and beans. Grain products include bread, pasta, cereal, and tortillas. Starchy vegetables include potatoes, peas, corn, lima beans, yams, and squash. Kidney beans, tobar beans, black beans, garbanzo beans, and lentils also contain starches.    Sugars  Sugars are found naturally in many foods. Or sugar can be added. Foods that contain natural sugar include fruits and fruit juices, dairy products, honey, and molasses. Added sugars are found in most desserts, processed foods, candy, regular soda, and fruit drinks. These are very helpful for treating low blood sugar, or hypoglycemia. They provide sugar quickly. Try to keep at least 15 to 20 grams of these simple sugars with you at all times. Eat this if you begin to have low blood sugar symptoms.    Fiber  Fiber comes from plant foods. Most fiber isnt digested by the body. Instead of raising blood sugar levels like other carbohydrates, it actually stops blood sugar from rising too fast. Fiber is found in fruits, vegetables, whole grains, beans, peas, and many nuts.    Carb counting  Keep track of the amount of carbohydrates you eat. This can help you keep the right balance of physical activity and medicine. The amount of carbohydrates needed will vary for each person. It depends on many things such as your health, the medicines you take, and how active you  are. Your healthcare team will help you figure out the right amount of carbohydrates for you. You may start with around 45 to 60 grams of carbohydrate per meal, depending on your situation. Carb counting is a system that helps you keep track of the carbohydrates you eat at each meal.  Carbohydrates come from a variety of foods. These include grains, starchy vegetables, fruit, milk, beans, and snack foods. You can either count carbohydrate grams or carbohydrate servings. When you count carbohydrate servings, 1 carbohydrate serving = 15 grams of carbohydrates.  Here are some examples of foods containing about 15 grams of carbohydrates (1 serving of carbohydrates):  1/2 cup of canned or frozen fruit  A small piece of fresh fruit (4 ounces)  1 slice of bread  1/2 cup of oatmeal  1/3 cup of rice  4 to 6 crackers  1/2 English muffin  1/2 cup of black beans  1/4 of a large baked potato (3 ounces)  2/3 cup of plain fat-free yogurt  1 cup of soup  1/2 cup of casserole  6 chicken nuggets  2-inch-square brownie or cake without frosting  2 small cookies  1/2 cup of ice cream or sherbet    Carb counting is easier when food labels are available. Look at the label to see how many grams of total carbohydrates the food contains. Then you can figure out how much you should eat.  Two very important lines to look at on the label are the serving size and the total carbohydrate amount. Here are some tips for using food labels to count your carbohydrate intake:  Check the serving size. The information on the label is based on that serving size. If you eat more than the listed serving size, you may have to double or triple the other information on the label.   Check the total grams of carbohydrates. Total carbohydrate from the label includes sugar, starch, and fiber. Be sure to use the total carbohydrate number and not sugar alone.  Know how many grams of carbohydrates you can have.  Be familiar with the matching portion sizes.  Compare  labels. Compare the labels of different products, looking at serving sizes and total carbohydrates to find the products that work best for you.   Don't forget protein and fat. With all the focus on carb counting, it might be easy to forget protein and fat in your meals. Don't forget to include sources of protein and healthy fat to balance your meals.  Its also important to be consistent with the amount and time you eat when taking a fixed dose of diabetes medicine. Work with your healthcare provider or dietitian if you need additional help. He or she can help you keep track of your carbohydrate intake. He or she can also help you figure out how many grams of carbohydrates you should have.      ---------------------------------------------------------------------------------------------------------------------------------------------------    Diabetes: Learning About Serving and Portion Sizes     A good rule of thumb: Devote half your plate to vegetables and green salad. Split the other half between protein and starchy carbohydrates. Fruit makes a good dessert.     Servings and portions. Whats the difference? These terms can be very confusing. But learning to measure serving sizes can help you figure out how many carbohydrates (carbs) and other foods you eat each day. They are also powerful tools for managing your weight.    Servings and portions  Many different words are used to describe amounts of food. If your health care provider uses a term youre not sure of, dont be afraid to ask. It helps to know the difference between servings and portions:  A serving size is a fixed size. Food producers use this term to describe their products. For example, the label on a cereal box could say that 1 cup of dry cereal = 1 serving.  A portion (also called a helping) is how much you eat or how much you put on your plate at a meal. For example, you might eat 2 cups of cereal at breakfast.    Using serving  "information  The portion you choose to eat (such as 2 cups of cereal) may be more than one serving as listed on the food label (such as 1 cup of cereal). Thats why it helps to measure or weigh the food you eat. Because the food label values are based on servings, youll need to know how many servings you eat at one sitting.     Ounces: 2 to 3 ounces is about the size of your palm.       1 Cup: 1 cup (or a medium-sized piece) is about the size of your fist.       1/2 Cup: 1/2 cup is about the size of your cupped hand.      One tablespoon is about the size of your thumb.  One teaspoon is about the size of the tip of your thumb.    Keeping track of serving sizes  When youre planning for a snack or a meal, keep servings in mind. If you dont have measuring cups or a scale handy, there are ways to eyeball serving sizes, such as comparing your food to the size of your hand (see pictures above).    Managing portion sizes  If your weight is a concern, reducing your portions can help. You can eat more than one serving of a food at once. But to keep from eating too much at one meal, learn how to manage your portions. A portion is the amount of each type of food on your plate. See the plate diagram for an example of balanced portions.    ---------------------------------------------------------------------------------------------------------------------------------------------------    Diabetes: Shopping for and Preparing Meals    Having diabetes doesnt mean you have to shop in a special aisle or look for special foods. But you will need to make choices. By comparing items and reading food labels, you can find the healthiest foods for you and your family.  Comparing items  When you shop, compare items to find the best ones for your needs. Keep these facts in mind:  No sugar added does not mean a product is sugar-free.  "Sugar-free" means less than 1/2 gram (g) of sugar per serving.  Fat free means less than 1/2 g of " fat per serving. This does not necessarily mean the product is low in calories.  Low fat means 3 g fat or less per serving. Reduced fat or less fat means 25% less fat than the regular version. Some of this fat may be saturated or trans fat. And calories per serving may be similar to the regular version.    Making small changes  Dont try to change all of your eating habits at once. Here are some ideas to start with:  Try fat-free or low-fat cheese, milk, and yogurt. Also try leaner cuts of meat. This will help you cut down on saturated fat.  Try whole-grain breads, brown rice, and whole-wheat pasta.  Load up on fresh or frozen vegetables. If you buy canned, choose low-sodium vegetables.  Avoid processed foods as much as possible. They tend to be low in fiber and high in trans fats and sodium.  Try tofu, soymilk, or meat substitutes.?They can help you cut cholesterol and saturated fat out of your diet.    Learning to read food labels  To find healthy foods that help you control blood sugar, learn how to read food labels. Look for the Nutrition Facts label on packaged foods. It will tell you how much carbohydrate, sugar, fat, and fiber is in each serving. Then, you can decide whether or not the food fits into your meal plan.    Using the food label  So, once you have the food label, what do you do with it? The food label helps in many ways. Use it to:  Compare items and decide which is the best for your health needs.  Track the number of carbohydrates in your portions.  Figure out how many servings of a food you can have and still stay within the number of carbohydrates for that meal.    Planning meals  For good blood sugar control, plan what and when youll eat. Start by creating a meal plan that includes all the food groups. Then, time your meals to help keep your blood sugar level steady. You may need to adjust your plan for special situations.    Eat from all the food groups  The basis of a healthy meal plan  is variety (eating many different types of foods). Look for lean meats, fresh fruits and vegetables, whole grains, and low-fat or non-fat dairy products. Eating a wide variety of foods provides the nutrients your body needs. It can also keep you from getting bored with your meal plan.    Reduce liquid sugars  Extra calories from sodas, sports drinks, and fruit drinks make it hard to keep blood sugar in range. Cut as many liquid sugars from your meal plan as you can. This includes most fruit juices, which are often high in natural or added sugar. Instead, drink plenty of water and other sugar-free beverages.    Eat less fat  If you need to lose some weight, try to reduce the amount of fat in your diet. This can also help lower your cholesterol level to keep blood vessels healthier. Cut fat by using only small amounts of liquid oil for cooking. Read food labels carefully to avoid foods with unhealthy trans fats.    Timing your meals  When it comes to blood sugar control, when you eat is as important as what you eat. You may need to eat several small meals spaced evenly throughout the day to stay in your target range. So dont skip breakfast or wait until late in the day to get most of your calories. Doing so can cause your blood sugar to rise too high or fall too low.    Cooking wisely  Broil, steam, bake, or grill meats and vegetables, instead of frying.  Instead of cream-based sauces or sugary glazes, flavor foods with vegetable purée, lemon or lime juice, or herb seasonings.  Remove skin from chicken and turkey before serving.  Look in cookbooks for easy, low-fat, low-sugar recipes. When making your usual recipes, cut sugar by 1/2 and fat by 1/3.      ---------------------------------------------------------------------------------------------------------------------------------------------------    Healthy Meals for Diabetes    Ask your healthcare team to help you make a meal plan that fits your needs. Your meal  plan tells you when to eat your meals and snacks, what kinds of foods to eat, and how much of each food to eat. You dont have to give up all the foods you like. But you do need to follow some guidelines.  Choose healthy carbohydrates  Starches, sugars, and fiber are all types of carbohydrates. Fiber can help lower your cholesterol and triglycerides. Fiber is also healthy for your heart. You should have 20 to 35 grams of total fiber each day. Fiber-rich foods include:  Whole-grain breads and cereals  Bulgur wheat  Brown rice     Whole-wheat pasta  Fruits and vegetables  Dry beans, and peas   Keep track of the amount of carbohydrates you eat. This can help you keep the right balance of physical activity and medicine. The amount of carbohydrates needed will vary for each person. It depends on many things such as your health, the medicines you take, and how active you are. Your healthcare team will help you figure out the right amount of carbohydrates for you. You may start with around 45 to 60 grams of carbohydrates per meal, depending on your situation.   Here are some examples of foods containing about 15 grams of carbohydrates (1 serving of carbohydrates):  1/2 cup of canned or frozen fruit  A small piece of fresh fruit (4 ounces)  1 slice of bread  1/2 cup of oatmeal  1/3 cup of rice  4 to 6 crackers  1/2 English muffin  1/2 cup of black beans  1/4 of a large baked potato (3 ounces)  2/3 cup of plain fat-free yogurt  1 cup of soup  1/2 cup of casserole  6 chicken nuggets  2-inch-square brownie or cake without frosting  2 small cookies  1/2 cup of ice cream or sherbet    Choose healthy protein foods  Eating protein that is low in fat can help you control your weight. It also helps keep your heart healthy. Low-fat protein foods include:  Fish  Plant proteins, such as dry beans and peas, nuts, and soy products like tofu and soymilk  Lean meat with all visible fat removed  Poultry with the skin removed  Low-fat or  nonfat milk, cheese, and yogurt    Limit unhealthy fats and sugar  Saturated and trans fats are unhealthy for your heart. They raise LDL (bad) cholesterol. Fat is also high in calories, so it can make you gain weight. To cut down on unhealthy fats and sugar, limit these foods:  Butter or margarine  Palm and palm kernel oils and coconut oil  Cream  Cheese  Allen  Lunch meats     Ice cream  Sweet bakery goods such as pies, muffins, and donuts  Jams and jellies  Candy bars  Regular sodas     How much to eat  The amount of food you eat affects your blood sugar. It also affects your weight. Your healthcare team will tell you how much of each type of food you should eat.  Use measuring cups and spoons and a food scale to measure serving sizes.  Learn what a correct serving size looks like on your plate. This will help when you are away from home and cant measure your servings.  Eat only the number of servings given on your meal plan for each food. Dont take seconds.  Learn to read food labels. Be sure to look at serving size, total carbohydrates, fiber, calories, sugar, and saturated and trans fats. Look for healthier alternatives to foods that have added sugar.  Plan ahead for parties so you can still have a good time without going overboard with unhealthy food choices. Set a good example yourself by bringing a healthy dish to pot lucks.     Choose healthy snacks  When it comes to snacks, we usually think about foods with added sugar and fats. But there are many other options for healthier snack choices. Here are a few snack ideas to choose from:  Snacks with less than 5 grams of carbohydrates  1 piece of string cheese  3 celery sticks plus 1 tablespoon of peanut butter  5 cherry tomatoes plus 1 tablespoon of ranch dressing  1 hard-boiled egg  1/4 cup of fresh blueberries   5 baby carrots  1 cup of light popcorn  1/2 cup of sugar-free gelatin  15 almonds  Snacks with about 10 to 20 grams of carbohydrates  1/3 cup of  hummus plus 1 cup of fresh cut nonstarchy vegetables (carrots, green peppers, broccoli, celery, or a combination)  1/2 cup of fresh or canned fruit plus 1/4 cup of cottage cheese  1/2 cup of tuna salad with 4 crackers  2 rice cakes and a tablespoon of peanut butter  1 small apple or orange  3 cups light popcorn  1/2 of a turkey sandwich (1 slice of whole-wheat bread, 2 ounces of turkey, and mustard)  Portion sizes are important to controlling your blood sugar and staying at a healthy weight. Stock up on healthy snack items so you always have them on hand.    When to eat  Your meal plan will likely include breakfast, lunch, dinner, and some snacks.  Try to eat your meals and snacks at about the same times each day.  Eat all your meals and snacks. Skipping a meal or snack can make your blood sugar drop too low. It can also cause you to eat too much at the next meal or snack. Then your blood sugar could get too high.    ---------------------------------------------------------------------------------------------------------------------------------------------------    Eating Out When You Have Diabetes  Eating right is an important part of keeping your blood sugar in your target range. You just need to make healthy choices.    Be creative when eating out. Many places dont make you stick strictly to the menu. Instead of ordering a large entree, choose an appetizer or two and a bowl of soup. A mix of side orders can also make a good meal. Read the descriptions of other entrees and specials. If another entree comes with baby carrots, ask for a side order of them even if they dont come with your entree. Ask how food is prepared or if it can be made differently.  Tips for making the most of your meal out  Ask to have high-fat, high-calorie extras, such as French fries and potato chips, left off your plate so you wont be tempted.   Ask what substitutions are available. Instead of French fries, you may be able to have a  side of salad with low-calorie dressing.  Order low-fat milk instead of cream for your coffee.  Ask for vegetables and main courses to be served without sauces, butter, margarine, or oil.  Be aware of portion size. You dont have to clean your plate. Take half your meal home in a doggie bag to eat the next day.  Look for heart-healthy or low-fat entrees that include whole grains, vegetables, or fruits.  Choose foods that are grilled, broiled, or steamed.  Avoid dishes that are described on the menu as fried, breaded, smothered, rich, or creamy.    Tips for restaurant meals  When you eat away from home try these tips:  Try to schedule your dining-out meal at your normal meal time. Make a reservation if possible, so you don't have to wait to eat. If you can't make a reservation, try to arrive at the restaurant at a less-busy time to cut down your wait time. Eat a small fruit or starch snack at your regular mealtime if your restaurant meal is going to be later than usual.   Call ahead to see if the restaurant can meet your dietary needs if you've never been there before. Or you can go online to see the menu ahead of time.  Carry some crackers with you in case the restaurant needs you to wait until you can be served.  Ask how foods are prepared before you order.  Instead of fried, sautéed, or breaded foods, choose ones that are broiled, steamed, grilled, or baked.  Ask for sauces, gravies, and dressings on the side.  Only eat an amount that fits your meal plan. Remember: You can take home the leftovers.  Save dessert for special occasions. Then choose a small dessert or share one with a friend or family member.    Make healthy choices  Fast food  Garden salad with light dressing on the side  Baked potato with vegetables or herbs  Broiled, roasted, or grilled chicken sandwich  Sliced turkey or lean roast beef sandwich    Mexican  Chicken enchilada, without cheese or sour cream   Small burrito with whole beans and  chicken  Whole beans (not refried) and rice  Chicken or fish fajitas    Steakhouse  Grilled or broiled lean cuts of beef  Baked potato with vegetables or herbs  Broiled or baked chicken. Dont eat the skin.  Steamed vegetables    Asian  Steamed dumplings or potstickers  Broiled, boiled, or steamed meats or fish  Sushi or sashimi  Steamed rice or boiled noodles. One serving is equal to 1/3 cup.      © 7385-9470 [employer]. [address] [place] [address], [place] [address]. All rights reserved. This information is not intended as a substitute for professional medical care. Always follow your healthcare professional's instructions.

## 2025-05-30 ENCOUNTER — TELEPHONE (OUTPATIENT)
Dept: HEPATOLOGY | Facility: CLINIC | Age: 75
End: 2025-05-30
Payer: COMMERCIAL

## 2025-05-30 NOTE — TELEPHONE ENCOUNTER
Called the patient to schedule a hepatology consult from referral.  No answer, left message with call back # [phone].

## 2025-06-11 ENCOUNTER — OFFICE VISIT (OUTPATIENT)
Dept: HEPATOLOGY | Facility: CLINIC | Age: 75
End: 2025-06-11
Payer: COMMERCIAL

## 2025-06-11 VITALS — WEIGHT: 162.25 LBS | BODY MASS INDEX: 30.63 KG/M2 | HEIGHT: 61 IN

## 2025-06-11 DIAGNOSIS — R74.8 ELEVATED LIVER ENZYMES: Primary | ICD-10-CM

## 2025-06-11 DIAGNOSIS — R79.89 ELEVATED LFTS: ICD-10-CM

## 2025-06-11 PROBLEM — E66.01 SEVERE OBESITY (BMI 35.0-35.9 WITH COMORBIDITY): Status: RESOLVED | Noted: 2025-05-27 | Resolved: 2025-06-11

## 2025-06-11 PROCEDURE — 3044F HG A1C LEVEL LT 7.0%: CPT | Mod: CPTII,S$GLB,, | Performed by: NURSE PRACTITIONER

## 2025-06-11 PROCEDURE — 99203 OFFICE O/P NEW LOW 30 MIN: CPT | Mod: S$GLB,,, | Performed by: NURSE PRACTITIONER

## 2025-06-11 PROCEDURE — 99999 PR PBB SHADOW E&M-EST. PATIENT-LVL IV: CPT | Mod: PBBFAC,,, | Performed by: NURSE PRACTITIONER

## 2025-06-11 PROCEDURE — 4010F ACE/ARB THERAPY RXD/TAKEN: CPT | Mod: CPTII,S$GLB,, | Performed by: NURSE PRACTITIONER

## 2025-06-11 PROCEDURE — 1126F AMNT PAIN NOTED NONE PRSNT: CPT | Mod: CPTII,S$GLB,, | Performed by: NURSE PRACTITIONER

## 2025-06-11 PROCEDURE — 3288F FALL RISK ASSESSMENT DOCD: CPT | Mod: CPTII,S$GLB,, | Performed by: NURSE PRACTITIONER

## 2025-06-11 PROCEDURE — 3008F BODY MASS INDEX DOCD: CPT | Mod: CPTII,S$GLB,, | Performed by: NURSE PRACTITIONER

## 2025-06-11 PROCEDURE — 1101F PT FALLS ASSESS-DOCD LE1/YR: CPT | Mod: CPTII,S$GLB,, | Performed by: NURSE PRACTITIONER

## 2025-06-11 PROCEDURE — 1160F RVW MEDS BY RX/DR IN RCRD: CPT | Mod: CPTII,S$GLB,, | Performed by: NURSE PRACTITIONER

## 2025-06-11 PROCEDURE — 1159F MED LIST DOCD IN RCRD: CPT | Mod: CPTII,S$GLB,, | Performed by: NURSE PRACTITIONER

## 2025-06-11 NOTE — PROGRESS NOTES
[facility] NEW PT [facility]    REFERRING PROVIDER:  Linda Cm  PCP: Lenny Javier MD     CHIEF COMPLAINT: intermittently elevated liver enzymes     HPI: This is a 74 y.o. patient with PMH noted below, presenting for evaluation of above    Previous serologic w/u negative for autoimmune etiology (except mildly positive ROBERTO CARLOS), and viral hepatitis B and C - will complete/repeat workup     Prior serologic workup:   Lab Results   Component Value Date    SMOOTHMUSCAB Negative 1:40 10/22/2024    AMAIFA Negative 1:40 10/22/2024    ANASCREEN Positive (A) 10/22/2024    FERRITIN 554 (H) 10/22/2024    FESATURATED 42 10/22/2024    HEPBSAG Non-reactive 10/07/2024    HEPCAB Non-reactive 10/07/2024    HEPAIGM Non-reactive 10/07/2024       Liver fibrosis staging:  -- fibroscan per endo 11/2024 noted F0, S0 (kPA 5.3, )    Interval HPI: Presents today alone.   Crestor since 2016  Does report some Muscle soreness in thighs/legs but also has arthritis in joints   No herbal meds     Labs done 5/2025 show elevated transaminase levels, intermittently but mostly normal     Lab Results   Component Value Date    ALT 57 (H) 05/22/2025    AST 40 05/22/2025    ALKPHOS 70 05/22/2025    BILITOT 0.6 05/22/2025    ALBUMIN 3.1 (L) 05/22/2025    INR 1.1 01/25/2005     01/28/2025       Abd U/S done 10/2024 showed normal echogenicity, liver cyst    Denies family history of liver disease . Denies significant alcohol consumption currently or in the past-   Social History     Substance and Sexual Activity   Alcohol Use No         Immunity to Hep A and B - will check titers         Allergy and medication list reviewed and updated     PMHX:  has a past medical history of Cataract, CKD (chronic kidney disease) stage 3, GFR 30-59 ml/min (03/21/2014), Diabetes mellitus type II, GERD (gastroesophageal reflux disease), Glaucoma (increased eye pressure), HLD (hyperlipidemia), HTN (hypertension), Hypothyroidism, Morbid  "obesity (2014), Osteoporosis, and Vitamin D deficiency (2014).    PSHX:  has a past surgical history that includes  section, low transverse.    FAMILY HISTORY: Updated and reviewed in EPIC    ROS:   GENERAL: Denies fatigue  CARDIOVASCULAR: Denies edema  GI: Denies abdominal pain  SKIN: Denies rash, itching   NEURO: Denies confusion, memory loss, or mood changes    PHYSICAL EXAM:   In no acute distress; alert and oriented to person, place and time  VITALS: Ht 5' 1" (1.549 m)   Wt 73.6 kg (162 lb 4.1 oz)   BMI 30.66 kg/m²   EYES: Sclerae anicteric  GI: Soft, non-tender, non-distended. No ascites.  EXTREMITIES:  No edema.  SKIN: Warm and dry. No jaundice. No telangectasias noted. No palmar erythema.  NEURO:  No asterixis.  PSYCH:  Thought and speech pattern appropriate. Behavior normal      EDUCATION:  See instructions discussed with patient in Instructions section of the After Visit Summary     ASSESSMENT & PLAN:  74 y.o. female with:  1. Elevated liver enzymes   -- Labs note elevated transaminase levels intermittently but mostly normal   --- synthetic liver function WNL  --- Abd U/S done 10/2024 showed normal echogenicity, liver cyst  -- do not suspect any medications contributing   --- previous serological work up : will obtain   --- Hep A and B immunity: see HPI  -- labs soon  Orders Placed This Encounter   Procedures    Ceruloplasmin    Alpha-1-Antitrypsin    Iron and TIBC    Ferritin    CK    ROBERTO CARLOS Screen w/Reflex    IgG    Anti-Smooth Muscle Antibody    Antimitochondrial Antibody    Hepatic Function Panel    Hepatitis B Core Antibody, Total    Hepatitis Panel, Acute    Hepatitis A antibody, IgG    Hepatitis B Surface Ab, Qualitative      -- fibroscan repeat based on sero w/u          Follow up for pending results of workup. Based on repeat labs   Orders Placed This Encounter   Procedures    Ceruloplasmin    Alpha-1-Antitrypsin    Iron and TIBC    Ferritin    CK    ROBERTO CARLOS Screen w/Reflex    IgG "    Anti-Smooth Muscle Antibody    Antimitochondrial Antibody    Hepatic Function Panel    Hepatitis B Core Antibody, Total    Hepatitis Panel, Acute    Hepatitis A antibody, IgG    Hepatitis B Surface Ab, Qualitative        Thank you for allowing me to participate in the care of JOSELO Arias    I spent a total of 30 minutes on the day of the visit.This includes face to face time and non-face to face time preparing to see the patient (eg, review of tests), obtaining and/or reviewing separately obtained history, documenting clinical information in the electronic or other health record, independently interpreting results and communicating results to the patient/family/caregiver, and coordinating care.         CC'ed note to:   Linda Cm, Lenny Regan MD

## 2025-06-11 NOTE — PATIENT INSTRUCTIONS
1. Fibroscan to look for fat or scar tissue in the liver was normal in November   2. Will check with labs to see if you need the Hep A/B combination vaccine   3. Labs before return to clinic to  check for multiple causes of liver disease. These labs will release to you as soon as they are resulted but I will update you once I get all of them back   4.  Follow up based on labs

## 2025-06-19 ENCOUNTER — LAB VISIT (OUTPATIENT)
Dept: LAB | Facility: HOSPITAL | Age: 75
End: 2025-06-19
Attending: FAMILY MEDICINE
Payer: COMMERCIAL

## 2025-06-19 DIAGNOSIS — N18.32 STAGE 3B CHRONIC KIDNEY DISEASE: ICD-10-CM

## 2025-06-19 DIAGNOSIS — M85.88 OTHER SPECIFIED DISORDERS OF BONE DENSITY AND STRUCTURE, OTHER SITE: ICD-10-CM

## 2025-06-19 DIAGNOSIS — E11.9 TYPE 2 DIABETES MELLITUS WITHOUT COMPLICATION, WITHOUT LONG-TERM CURRENT USE OF INSULIN: ICD-10-CM

## 2025-06-19 DIAGNOSIS — E03.9 HYPOTHYROIDISM, UNSPECIFIED TYPE: ICD-10-CM

## 2025-06-19 DIAGNOSIS — I10 ESSENTIAL HYPERTENSION: ICD-10-CM

## 2025-06-19 DIAGNOSIS — E87.6 HYPOKALEMIA: ICD-10-CM

## 2025-06-19 LAB
25(OH)D3+25(OH)D2 SERPL-MCNC: 61 NG/ML (ref 30–96)
ABSOLUTE EOSINOPHIL (OHS): 0.18 K/UL
ABSOLUTE MONOCYTE (OHS): 0.87 K/UL (ref 0.3–1)
ABSOLUTE NEUTROPHIL COUNT (OHS): 5.35 K/UL (ref 1.8–7.7)
ALBUMIN SERPL BCP-MCNC: 3.7 G/DL (ref 3.5–5.2)
ALBUMIN/CREAT UR: 131.2 UG/MG
ALP SERPL-CCNC: 72 UNIT/L (ref 40–150)
ALT SERPL W/O P-5'-P-CCNC: 23 UNIT/L (ref 10–44)
ANION GAP (OHS): 9 MMOL/L (ref 8–16)
AST SERPL-CCNC: 27 UNIT/L (ref 11–45)
BASOPHILS # BLD AUTO: 0.04 K/UL
BASOPHILS NFR BLD AUTO: 0.4 %
BILIRUB SERPL-MCNC: 0.4 MG/DL (ref 0.1–1)
BUN SERPL-MCNC: 24 MG/DL (ref 8–23)
CALCIUM SERPL-MCNC: 10 MG/DL (ref 8.7–10.5)
CHLORIDE SERPL-SCNC: 108 MMOL/L (ref 95–110)
CO2 SERPL-SCNC: 23 MMOL/L (ref 23–29)
CREAT SERPL-MCNC: 1.4 MG/DL (ref 0.5–1.4)
CREAT UR-MCNC: 125 MG/DL (ref 15–325)
ERYTHROCYTE [DISTWIDTH] IN BLOOD BY AUTOMATED COUNT: 14.9 % (ref 11.5–14.5)
GFR SERPLBLD CREATININE-BSD FMLA CKD-EPI: 40 ML/MIN/1.73/M2
GLUCOSE SERPL-MCNC: 78 MG/DL (ref 70–110)
HCT VFR BLD AUTO: 36.7 % (ref 37–48.5)
HGB BLD-MCNC: 11.6 GM/DL (ref 12–16)
IMM GRANULOCYTES # BLD AUTO: 0.02 K/UL (ref 0–0.04)
IMM GRANULOCYTES NFR BLD AUTO: 0.2 % (ref 0–0.5)
LYMPHOCYTES # BLD AUTO: 2.97 K/UL (ref 1–4.8)
MCH RBC QN AUTO: 27.8 PG (ref 27–31)
MCHC RBC AUTO-ENTMCNC: 31.6 G/DL (ref 32–36)
MCV RBC AUTO: 88 FL (ref 82–98)
MICROALBUMIN UR-MCNC: 164 UG/ML (ref ?–5000)
NUCLEATED RBC (/100WBC) (OHS): 0 /100 WBC
PLATELET # BLD AUTO: 178 K/UL (ref 150–450)
PMV BLD AUTO: 11.9 FL (ref 9.2–12.9)
POTASSIUM SERPL-SCNC: 3.6 MMOL/L (ref 3.5–5.1)
PROT SERPL-MCNC: 7 GM/DL (ref 6–8.4)
RBC # BLD AUTO: 4.18 M/UL (ref 4–5.4)
RELATIVE EOSINOPHIL (OHS): 1.9 %
RELATIVE LYMPHOCYTE (OHS): 31.5 % (ref 18–48)
RELATIVE MONOCYTE (OHS): 9.2 % (ref 4–15)
RELATIVE NEUTROPHIL (OHS): 56.8 % (ref 38–73)
SODIUM SERPL-SCNC: 140 MMOL/L (ref 136–145)
TSH SERPL-ACNC: 2.1 UIU/ML (ref 0.4–4)
WBC # BLD AUTO: 9.43 K/UL (ref 3.9–12.7)

## 2025-06-19 PROCEDURE — 82043 UR ALBUMIN QUANTITATIVE: CPT

## 2025-06-19 PROCEDURE — 36415 COLL VENOUS BLD VENIPUNCTURE: CPT | Mod: PO

## 2025-06-19 PROCEDURE — 82306 VITAMIN D 25 HYDROXY: CPT

## 2025-06-19 PROCEDURE — 84075 ASSAY ALKALINE PHOSPHATASE: CPT

## 2025-06-19 PROCEDURE — 85025 COMPLETE CBC W/AUTO DIFF WBC: CPT

## 2025-06-19 PROCEDURE — 84443 ASSAY THYROID STIM HORMONE: CPT

## 2025-06-20 ENCOUNTER — RESULTS FOLLOW-UP (OUTPATIENT)
Dept: FAMILY MEDICINE | Facility: CLINIC | Age: 75
End: 2025-06-20

## 2025-06-23 ENCOUNTER — OFFICE VISIT (OUTPATIENT)
Dept: FAMILY MEDICINE | Facility: CLINIC | Age: 75
End: 2025-06-23
Payer: COMMERCIAL

## 2025-06-23 VITALS
HEIGHT: 61 IN | HEART RATE: 67 BPM | TEMPERATURE: 98 F | OXYGEN SATURATION: 98 % | SYSTOLIC BLOOD PRESSURE: 126 MMHG | WEIGHT: 162.94 LBS | DIASTOLIC BLOOD PRESSURE: 50 MMHG | BODY MASS INDEX: 30.76 KG/M2

## 2025-06-23 DIAGNOSIS — I10 ESSENTIAL HYPERTENSION: ICD-10-CM

## 2025-06-23 DIAGNOSIS — N18.32 STAGE 3B CHRONIC KIDNEY DISEASE: ICD-10-CM

## 2025-06-23 DIAGNOSIS — Z23 NEED FOR DIPHTHERIA-TETANUS-PERTUSSIS (TDAP) VACCINE: Primary | ICD-10-CM

## 2025-06-23 DIAGNOSIS — N18.30 DIABETES MELLITUS WITH STAGE 3 CHRONIC KIDNEY DISEASE: ICD-10-CM

## 2025-06-23 DIAGNOSIS — M81.8 OTHER OSTEOPOROSIS WITHOUT CURRENT PATHOLOGICAL FRACTURE: ICD-10-CM

## 2025-06-23 DIAGNOSIS — E11.22 DIABETES MELLITUS WITH STAGE 3 CHRONIC KIDNEY DISEASE: ICD-10-CM

## 2025-06-23 DIAGNOSIS — E03.9 HYPOTHYROIDISM, UNSPECIFIED TYPE: ICD-10-CM

## 2025-06-23 PROCEDURE — 90715 TDAP VACCINE 7 YRS/> IM: CPT | Mod: S$GLB,,, | Performed by: FAMILY MEDICINE

## 2025-06-23 PROCEDURE — 3044F HG A1C LEVEL LT 7.0%: CPT | Mod: CPTII,S$GLB,, | Performed by: FAMILY MEDICINE

## 2025-06-23 PROCEDURE — 1126F AMNT PAIN NOTED NONE PRSNT: CPT | Mod: CPTII,S$GLB,, | Performed by: FAMILY MEDICINE

## 2025-06-23 PROCEDURE — 3074F SYST BP LT 130 MM HG: CPT | Mod: CPTII,S$GLB,, | Performed by: FAMILY MEDICINE

## 2025-06-23 PROCEDURE — 3288F FALL RISK ASSESSMENT DOCD: CPT | Mod: CPTII,S$GLB,, | Performed by: FAMILY MEDICINE

## 2025-06-23 PROCEDURE — 4010F ACE/ARB THERAPY RXD/TAKEN: CPT | Mod: CPTII,S$GLB,, | Performed by: FAMILY MEDICINE

## 2025-06-23 PROCEDURE — 1101F PT FALLS ASSESS-DOCD LE1/YR: CPT | Mod: CPTII,S$GLB,, | Performed by: FAMILY MEDICINE

## 2025-06-23 PROCEDURE — 1159F MED LIST DOCD IN RCRD: CPT | Mod: CPTII,S$GLB,, | Performed by: FAMILY MEDICINE

## 2025-06-23 PROCEDURE — 1160F RVW MEDS BY RX/DR IN RCRD: CPT | Mod: CPTII,S$GLB,, | Performed by: FAMILY MEDICINE

## 2025-06-23 PROCEDURE — 3008F BODY MASS INDEX DOCD: CPT | Mod: CPTII,S$GLB,, | Performed by: FAMILY MEDICINE

## 2025-06-23 PROCEDURE — 99214 OFFICE O/P EST MOD 30 MIN: CPT | Mod: 25,S$GLB,, | Performed by: FAMILY MEDICINE

## 2025-06-23 PROCEDURE — G2211 COMPLEX E/M VISIT ADD ON: HCPCS | Mod: S$GLB,,, | Performed by: FAMILY MEDICINE

## 2025-06-23 PROCEDURE — 99999 PR PBB SHADOW E&M-EST. PATIENT-LVL V: CPT | Mod: PBBFAC,,, | Performed by: FAMILY MEDICINE

## 2025-06-23 PROCEDURE — 3066F NEPHROPATHY DOC TX: CPT | Mod: CPTII,S$GLB,, | Performed by: FAMILY MEDICINE

## 2025-06-23 PROCEDURE — 90471 IMMUNIZATION ADMIN: CPT | Mod: S$GLB,,, | Performed by: FAMILY MEDICINE

## 2025-06-23 PROCEDURE — 3078F DIAST BP <80 MM HG: CPT | Mod: CPTII,S$GLB,, | Performed by: FAMILY MEDICINE

## 2025-06-23 PROCEDURE — 3060F POS MICROALBUMINURIA REV: CPT | Mod: CPTII,S$GLB,, | Performed by: FAMILY MEDICINE

## 2025-06-23 NOTE — PROGRESS NOTES
(Portions of this note were dictated using voice recognition software and may contain dictation related errors in spelling/grammar/syntax not found on text review)    CC:   Chief Complaint   Patient presents with    Follow-up         HPI: 74 y.o. female    Hypertension on losartan 50 mg daily  amlodipine 5 mg daily  .  BP stable .Had lost significant amount of weight intentionally through healthy diet.    Was on hydrochlorothiazide but given decreasing renal function as below, she was advised to stay off this.  Blood pressure stable even off thiazide.  Does not take any NSAIDs.       Hyperlipidemia, Crestor 40 mg daily.      Diabetes with CKD 3, followed by endocrinology.  controlled on metformin 500 b.i.d. , Ozempic 0.5 mg weekly     Hypothyroidism on Synthroid, currently on 88 mcg daily.      Osteoporosis, currently on Reclast     Had elevated transaminases last year, subsequently has followed up with hepatology, visit reviewed from 06/11/2025.  Transaminases have improved on subsequent testing.  Liver ultrasound showed cholelithiasis without cholecystitis.  1.9 cm hepatic cyst.  No other masses.  FibroScan done November of 2024 stage F 0-1 normal.  Had autoimmune workup with negative AMA, ASMA.  Had low-level ROBERTO CARLOS titer 1:80, negative follow up antibody testing with SS, ds DNA, anti Bradshaw/RNP.  Negative hepatitis viral testing.  She thinks she may have been taking a lot of Tylenol at that time because of her hip and back pain but that is been doing better so she is only taking Tylenol once in awhile.    Past Medical History:   Diagnosis Date    Cataract     CKD (chronic kidney disease) stage 3, GFR 30-59 ml/min 03/21/2014    Diabetes mellitus type II     GERD (gastroesophageal reflux disease)     Glaucoma (increased eye pressure)     HLD (hyperlipidemia)     HTN (hypertension)     Hypothyroidism     Morbid obesity 03/21/2014    Osteoporosis     Vitamin D deficiency 03/21/2014       Past Surgical History:    Procedure Laterality Date     SECTION, LOW TRANSVERSE         Family History   Problem Relation Name Age of Onset    Coronary artery disease Mother          premature, 57    No Known Problems Father      Cancer Sister      Breast cancer Sister  33    No Known Problems Maternal Aunt      Glaucoma Maternal Uncle      Diabetes Maternal Uncle      No Known Problems Paternal Aunt      No Known Problems Paternal Uncle      No Known Problems Maternal Grandmother      No Known Problems Maternal Grandfather      No Known Problems Paternal Grandmother      No Known Problems Paternal Grandfather      No Known Problems Brother      Diabetes Other      Hypertension Other      Thyroid disease Other      Amblyopia Neg Hx      Blindness Neg Hx      Cataracts Neg Hx      Macular degeneration Neg Hx      Retinal detachment Neg Hx      Strabismus Neg Hx      Stroke Neg Hx         Social History     Socioeconomic History    Marital status:    Tobacco Use    Smoking status: Former     Types: Cigarettes    Smokeless tobacco: Never    Tobacco comments:     occasionally   Substance and Sexual Activity    Alcohol use: No    Drug use: No       Lab Results   Component Value Date    WBC 9.43 2025    HGB 11.6 (L) 2025    HCT 36.7 (L) 2025    MCV 88 2025     2025    CHOL 76 (L) 2025    TRIG 62 2025    HDL 30 (L) 2025    ALT 23 2025    AST 27 2025    BILITOT 0.4 2025    ALKPHOS 72 2025     2025    K 3.6 2025     2025    CREATININE 1.4 2025    ESTGFRAFRICA 53.3 (A) 2022    EGFRNONAA 46.2 (A) 2022    EGFRNORACEVR 40 (L) 2025    CALCIUM 10.0 2025    ALBUMIN 3.7 2025    BUN 24 (H) 2025    CO2 23 2025    TSH 2.098 2025    INR 1.1 2005    HGBA1C 5.3 2025    MICALBCREAT 131.2 (H) 2025    LDLCALC 33.6 (L) 2025    GLU 78 2025    [id number] 61  06/19/2025             Hemoglobin A1C (%)   Date Value   01/28/2025 4.7   09/27/2024 5.0   03/11/2024 5.0   08/30/2023 5.4   02/27/2023 5.4     Hemoglobin A1c (%)   Date Value   04/30/2025 5.3           eGFR if non African American (mL/min/1.73 m^2)   Date Value   05/30/2022 46.2 (A)   09/01/2021 35.3 (A)   05/26/2021 51.7 (A)   02/26/2020 47.4 (A)   11/16/2018 44.5 (A)   06/16/2017 48.4 (A)   03/07/2017 44.7 (A)   11/03/2016 48 (A)   06/02/2016 53.6 (A)   11/02/2015 >60     eGFR   Date Value   06/19/2025 40 mL/min/1.73/m2 (L)   05/22/2025 37 mL/min/1.73/m2 (L)   04/30/2025 37 mL/min/1.73/m2 (L)   01/28/2025 48 mL/min/1.73 m^2 (A)   10/22/2024 59.9 mL/min/1.73 m^2 (A)   10/07/2024 53.4 mL/min/1.73 m^2 (A)   09/27/2024 40 mL/min/1.73 m^2 (A)   04/09/2024 48.4 mL/min/1.73 m^2 (A)   03/11/2024 40 mL/min/1.73 m^2 (A)   08/30/2023 44.2 mL/min/1.73 m^2 (A)   02/27/2023 37.3 mL/min/1.73 m^2 (A)   11/21/2022 37.3 mL/min/1.73 m^2 (A)   10/17/2022 32.1 mL/min/1.73 m^2 (A)     Creatinine (mg/dL)   Date Value   06/19/2025 1.4   05/22/2025 1.5 (H)   04/30/2025 1.5 (H)   01/28/2025 1.2   10/22/2024 1.0   10/07/2024 1.1   09/27/2024 1.4   04/09/2024 1.2   03/11/2024 1.4   08/30/2023 1.3   02/27/2023 1.5 (H)   11/21/2022 1.5 (H)   10/17/2022 1.7 (H)     Hemoglobin (g/dL)   Date Value   01/28/2025 14.1   10/22/2024 11.4 (L)   09/27/2024 12.8   03/11/2024 13.5   02/27/2023 12.1   09/08/2022 11.8 (L)   05/30/2022 10.9 (L)   05/26/2021 13.4   02/26/2020 12.3   11/16/2018 13.6     HGB (gm/dL)   Date Value   06/19/2025 11.6 (L)     AST   Date Value   06/19/2025 27 unit/L   05/22/2025 40 unit/L   04/30/2025 44 unit/L   01/28/2025 19 U/L   12/04/2024 43 U/L (H)   10/22/2024 33 U/L   10/07/2024 158 U/L (H)   09/27/2024 142 U/L (H)   04/09/2024 35 U/L   03/11/2024 19 U/L   08/30/2023 32 U/L   02/27/2023 25 U/L   10/17/2022 21 U/L     ALT   Date Value   06/19/2025 23 unit/L   05/22/2025 57 unit/L (H)   04/30/2025 38 unit/L   01/28/2025 14 U/L  "  12/04/2024 42 U/L   10/22/2024 47 U/L (H)   10/07/2024 222 U/L (H)   09/27/2024 107 U/L (H)   04/09/2024 44 U/L   03/11/2024 28 U/L   08/30/2023 31 U/L   02/27/2023 25 U/L   10/17/2022 22 U/L                   Vital signs reviewed  Vitals:    06/23/25 1115   BP: (!) 126/50   BP Location: Left arm   Patient Position: Sitting   Pulse: 67   Temp: 97.6 °F (36.4 °C)   TempSrc: Oral   SpO2: 98%   Weight: 73.9 kg (162 lb 14.7 oz)   Height: 5' 1" (1.549 m)       Wt Readings from Last 10 Encounters:   06/23/25 73.9 kg (162 lb 14.7 oz)   06/11/25 73.6 kg (162 lb 4.1 oz)   05/28/25 74.8 kg (165 lb)   04/04/25 74.4 kg (164 lb)   01/28/25 74.4 kg (164 lb 0.4 oz)   10/23/24 73.1 kg (161 lb 2.5 oz)   09/27/24 73 kg (160 lb 15 oz)   04/19/24 78.2 kg (172 lb 6.4 oz)   03/11/24 79.9 kg (176 lb 2.4 oz)   10/02/23 90.1 kg (198 lb 10.2 oz)       PE:   APPEARANCE: Well nourished, well developed, in no acute distress.    HEAD: Normocephalic, atraumatic.  NECK: Supple with no cervical lymphadenopathy.  No carotid bruits.  No thyromegaly  CHEST: Good inspiratory effort. Lungs clear to auscultation with no wheezes or crackles.  CARDIOVASCULAR: Normal S1, S2. No rubs, murmurs, or gallops.  ABDOMEN: Bowel sounds normal. Not distended. Soft. No tenderness or masses. No organomegaly.  EXTREMITIES: No edema .               IMPRESSION  1. Need for diphtheria-tetanus-pertussis (Tdap) vaccine    2. Stage 3b chronic kidney disease    3. Essential hypertension    4. Hypothyroidism, unspecified type    5. Other osteoporosis without current pathological fracture    6. Diabetes mellitus with stage 3 chronic kidney disease                    PLAN          Diabetes health maintenance:  -- advise regular and consistent glucose monitoring and medication compliance.  -- advise daily foot checks  -- advise yearly ophthalmologic exams    -- advise adequate dietary and exercise modification  -- advise regular dental visits  -- advise daily low-dose aspirin use " if patient is not on other anticoagulants and there are no other contraindications.  -- Medication management:  Continue metformin 500 b.i.d. Ozempic 0.5 mg weekly (followed by endocrinology)    Dyslipidemia:  Continue Crestor    Hypothyroidism:  Continue Synthroid 88 mcg daily.  TSH normal    Hypertension:   amlodipine 5 mg daily.   losartan   50 mg daily    Osteoporosis:  Reclast yearly    CKD: Continue to monitor labs.  BP stable.  Diabetes has been well controlled.       Transaminase elevation, improving    She has multiple hepatology and Endocrine labs in the chart, can schedule for six-months in addition to CBC below.  Visit to follow after that lab draw      Orders Placed This Encounter   Procedures    CBC Auto Differential       Medications Ordered This Encounter   Medications    Tdap (BOOSTRIX) vaccine injection 0.5 mL               HEALTH SCREENINGS  Immunization:  Tdap in 2015 , update today  Pneumovax : 9/24/12, 2018  Flu: utd  Pcv:  09/22/2017  CoVID vaccine booster    up-to-date  Zoster up-to-date     Shingles vaccine up-to-date  Mammogram 04/04/25  Pap:2016     Colonoscopy 2008. Multiple small hyperplastic polyps removed. Repeat in 7-10 years, Fit on 2/4/25    DEXA scan:  02/26/2020.  Osteopenia:  L-spine T-score is-1.6.  Left Femoral neck T-score is-1.4.  Right femoral neck T-score -1.5,  DEXA scan 02/27/2023.  Lumbar T-score is-3.1 area left femoral neck T-score is-2.7.  DEXA scan 3/5/25: lumbar t score is 1.3, left FN t -2.0

## 2025-07-01 ENCOUNTER — OFFICE VISIT (OUTPATIENT)
Dept: OPHTHALMOLOGY | Facility: CLINIC | Age: 75
End: 2025-07-01
Payer: COMMERCIAL

## 2025-07-01 DIAGNOSIS — E11.9 DIABETES MELLITUS TYPE 2 WITHOUT RETINOPATHY: ICD-10-CM

## 2025-07-01 DIAGNOSIS — H25.813 COMBINED FORMS OF AGE-RELATED CATARACT OF BOTH EYES: Primary | ICD-10-CM

## 2025-07-01 DIAGNOSIS — H40.053 BILATERAL OCULAR HYPERTENSION: ICD-10-CM

## 2025-07-01 PROCEDURE — 1101F PT FALLS ASSESS-DOCD LE1/YR: CPT | Mod: CPTII,S$GLB,, | Performed by: OPHTHALMOLOGY

## 2025-07-01 PROCEDURE — 4010F ACE/ARB THERAPY RXD/TAKEN: CPT | Mod: CPTII,S$GLB,, | Performed by: OPHTHALMOLOGY

## 2025-07-01 PROCEDURE — 99204 OFFICE O/P NEW MOD 45 MIN: CPT | Mod: S$GLB,,, | Performed by: OPHTHALMOLOGY

## 2025-07-01 PROCEDURE — 1159F MED LIST DOCD IN RCRD: CPT | Mod: CPTII,S$GLB,, | Performed by: OPHTHALMOLOGY

## 2025-07-01 PROCEDURE — 3066F NEPHROPATHY DOC TX: CPT | Mod: CPTII,S$GLB,, | Performed by: OPHTHALMOLOGY

## 2025-07-01 PROCEDURE — 3044F HG A1C LEVEL LT 7.0%: CPT | Mod: CPTII,S$GLB,, | Performed by: OPHTHALMOLOGY

## 2025-07-01 PROCEDURE — 1126F AMNT PAIN NOTED NONE PRSNT: CPT | Mod: CPTII,S$GLB,, | Performed by: OPHTHALMOLOGY

## 2025-07-01 PROCEDURE — 99999 PR PBB SHADOW E&M-EST. PATIENT-LVL III: CPT | Mod: PBBFAC,,, | Performed by: OPHTHALMOLOGY

## 2025-07-01 PROCEDURE — 2023F DILAT RTA XM W/O RTNOPTHY: CPT | Mod: CPTII,S$GLB,, | Performed by: OPHTHALMOLOGY

## 2025-07-01 PROCEDURE — 3288F FALL RISK ASSESSMENT DOCD: CPT | Mod: CPTII,S$GLB,, | Performed by: OPHTHALMOLOGY

## 2025-07-01 PROCEDURE — 3060F POS MICROALBUMINURIA REV: CPT | Mod: CPTII,S$GLB,, | Performed by: OPHTHALMOLOGY

## 2025-07-01 NOTE — PROGRESS NOTES
HPI    DLS: 3/18/2024 CE     Pt states specs are about 2 yrs old. Not seeing well with them anymore.   Watery OU. Trouble with glare and sunlight.     Denies pain/ FOL/ floaters.     Latanoprost OU QHS   Timolol OU QAM       Last edited by María Katz on 7/1/2025 10:44 AM.            Assessment /Plan     For exam results, see Encounter Report.    Combined forms of age-related cataract of both eyes  -     Ambulatory referral/consult to Ophthalmology    Diabetes mellitus type 2 without retinopathy    Bilateral ocular hypertension      1. Combined forms of age-related cataract of both eyes (Primary)  Patient with visually significant cataract impacting abiliity ADLs (reading, driving, PM driving, glare).  Discussed options, R & B, expectations, patient voices good understanding and wishes to proceed with procedure. Patient will likely benefit from sx.    High hyperopia and astigmatism  Discussed toric IOL  Schedule CEIOL OS then OD  RTC preop with ronny    2. Diabetes mellitus type 2 without retinopathy  Diabetes without retinopathy, discussed with patient importance of glucose control and follow up.  Patient voices understanding.    3. Bilateral ocular hypertension  IOP normal on treatment  ONHs healthy  Consider stopping drops post CEIOL

## 2025-07-10 ENCOUNTER — OFFICE VISIT (OUTPATIENT)
Dept: OPHTHALMOLOGY | Facility: CLINIC | Age: 75
End: 2025-07-10
Payer: COMMERCIAL

## 2025-07-10 DIAGNOSIS — H25.813 COMBINED FORMS OF AGE-RELATED CATARACT OF BOTH EYES: Primary | ICD-10-CM

## 2025-07-10 PROCEDURE — 99999 PR PBB SHADOW E&M-EST. PATIENT-LVL IV: CPT | Mod: PBBFAC,,, | Performed by: OPHTHALMOLOGY

## 2025-07-10 RX ORDER — PREDNISOLONE ACETATE 10 MG/ML
1 SUSPENSION/ DROPS OPHTHALMIC 4 TIMES DAILY
Qty: 5 ML | Refills: 2 | Status: SHIPPED | OUTPATIENT
Start: 2025-07-10

## 2025-07-10 RX ORDER — MOXIFLOXACIN 5 MG/ML
1 SOLUTION/ DROPS OPHTHALMIC 4 TIMES DAILY
Qty: 3 ML | Refills: 1 | Status: SHIPPED | OUTPATIENT
Start: 2025-07-10

## 2025-07-10 RX ORDER — SODIUM CHLORIDE 9 MG/ML
INJECTION, SOLUTION INTRAVENOUS CONTINUOUS
OUTPATIENT
Start: 2025-07-10

## 2025-07-10 RX ORDER — CYCLOP/TROP/PROPA/PHEN/KET/WAT 1-1-0.1%
1 DROPS (EA) OPHTHALMIC (EYE)
OUTPATIENT
Start: 2025-07-10 | End: 2025-07-10

## 2025-07-10 RX ORDER — KETOROLAC TROMETHAMINE 5 MG/ML
1 SOLUTION OPHTHALMIC 4 TIMES DAILY
Qty: 5 ML | Refills: 2 | Status: SHIPPED | OUTPATIENT
Start: 2025-07-10

## 2025-07-10 NOTE — PROGRESS NOTES
HPI    Pre-op CE IOL schedule OS 7/16 & OD 8/20    Pt states having trouble with glare and decrease vision.  Last edited by Arlyn Esteban on 7/10/2025  2:34 PM.            Assessment /Plan     For exam results, see Encounter Report.    Combined forms of age-related cataract of both eyes      Patient with visually significant cataract impacting abiliity ADLs (reading, driving, PM driving, glare).  Discussed options, R & B, expectations, patient voices good understanding and wishes to proceed with procedure. Patient will likely benefit from sx and signed consent.    Proceed with CEIOL  High astigmatism, discussed toric IOL, pt declines  Monofocal dist IOL    Consider stopping OHTN med after sx

## 2025-07-14 NOTE — DISCHARGE INSTRUCTIONS
Discharge Instructions for  Surgery    USE DROPS AS INSTRUCTED:     PREDNISOLONE  ONE DROP 4 TIMES A DAY  Moxifloxacin ONE DROP 4 TIMES A DAY  KETOROLAC ONE DROP 4 TIMES A DAY   WAIT 2 MINUTES BETWEEN DROPS     BRING ALL EYE DROPS TO YOUR CLINIC VISITS    Wear sunglasses during the day  Do not sleep on the affected side and wear eye shield while sleeping for 2 weeks.  No exercise or lifting greater than 10 lbs for 2 weeks.  Try not to cough. If coughing a lot, take a cough suppressent  Do not bend over for 2 weeks.  Keep water it of your eye for 2 weeks.             Wear sunglasses for comfort as needed.  It is normal to feel a slight irritation, like there is an eyelash in the eye that had surgery.   You may take Tylenol for discomfort but if pain intensifies , call Dr     If you use eye drops for glaucoma you may continue to use them.      After Surgery:  Always be aware that any surgery can cause these symptoms:    Pain- Medication can be prescribed for pain to decrease your pain but may not completely take your pain away.  Over the Counter pain medicine my be enough and you can always use Ice and rest to help ease pain.    Bleeding- a little bleeding after a surgery is usually within normal.  If there is a lot of blood you need to notify your MD.  Emergency treatments of bleeding are cold application, elevation of the bleeding site and compression.    Infection- Infection after surgery is NOT a normal occurrence.  Signs of infection are fever, swelling, hot to touch the incision.  If this occurs notify your MD immediately.    Nausea- this can be common after a surgery especially if you have had anesthesia medicine or are taking pain medicine.  Staying on clear liquids, bland foods, gingerale, or over the counter anti nausea medicines can help.  If you vomit more than once, notify your MD.  Anti Nausea medicines can be prescribed.   Reason for Visit: Preoperative Evaluation   Procedure: Left breast lumpectomy  Surgeon: Dr. 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     She says that she is planning to undergo left breast lumpectomy and lymph node biopsy with Dr. Salazar likely on 2/4 but is also in the process of obtaining a second opinion from [facility].    The patient reports no symptoms of CP at rest or with exertion, dyspnea at rest or with exertion, PND, LE edema, claudication or palpitations.     The patient has no history of ischemic heart disease, CHF, CVD, diabetes, recent anticoagulation or antithrombotic use, or a personal or family history of coagulopathy. She does have a strong family history of heart disease though including hypertension, MI, and CHF in multiple family members. She has been on blood pressure medications for the past 20 years and it is currently under good control. She did have a CT calcium scoring done in 2021 with a score of 0.     The patient reports no history of positive cardiac stress test, cardiac cath or coronary revascularization. She says that she did have a stress test many years ago before being started on blood pressure medication.    The patient reports being able to achieve 4 METs of activity during walking up stairs and long distances and is more limited by knee pain and deconditioning and never chest pain or palpitations.     Of note, when asked about snoring she does say that she has been told by her  that she stops breathing in her sleep and that she does snore and has for many years. She has not had a sleep study done in the past. STOP BANG screening is with a score of at least  6.        Prior Anesthesia:  No issues with anesthesia in the past. She has had a knee surgery, hysterectomy, and multiple colonoscopies without complications.  Living Situation:  Home is secure and supportive and no post-op concerns with current living situation. She says that her  will be with her the day of the surgery and she also has an aunt who has offered to help her after the surgery as well.     Past Surgical History:   Past Surgical History:   Procedure Laterality Date    Breast biopsy Left 1/6/2025    Flexible sigmoidoscopy bx  02/20/2023    5 mm polyp    Knee scope,diagnostic Bilateral 09/2020    Dr Librado Rascon [facility]    Total abdom hysterectomy  2017    fibroid -  Dr Jung          Past Medical History    ALLERGIES:  No Known Allergies     Current Outpatient Medications   Medication Sig Dispense Refill    olmesartan-hydrochlorothiazide (BENICAR HCT) 40-25 MG per tablet TAKE 1 TABLET DAILY 90 tablet 3    Cholecalciferol (vitamin D3) 125 mcg (5,000 units) capsule Take 1 capsule by mouth daily. 180 capsule 0    fish oil-omega-3 fatty acids 1000 MG Cap Take 1 capsule by mouth daily. 90 capsule 3    triamcinolone (ARISTOCORT) 0.1 % ointment Apply 1 application. topically in the morning and 1 application. in the evening. 30 g 3    Turmeric, Curcuma Longa, (Curcumin) Powder Take by mouth daily.       No current facility-administered medications for this visit.       Immunization History   Administered Date(s) Administered    COVID Laxmi/Jean & Jean 18Y+ 04/07/2021    Influenza, split virus, quadrivalent, PF 10/01/2018, 09/09/2020    Tdap 10/01/2018       Patient Active Problem List   Diagnosis    Benign essential hypertension    Bulging lumbar disc    Eczema    Hypothyroid    Thyroid nodule    Vitamin D deficiency    Benign neuroendocrine neoplasm of rectum (CMD)    Primary malignant neuroendocrine tumor of rectum  (CMD)    Obesity, morbid, BMI 40.0-49.9  (CMD)    Ductal carcinoma in situ  (DCIS) of left breast       Past Medical History:   Diagnosis Date    DCIS (ductal carcinoma in situ) 01/06/2025    Left    Essential (primary) hypertension     Fibroids 10/14/2019    Lumbar herniated disc     Osteoarthritis     Thyroid nodule        Family History  Family History   Problem Relation Age of Onset    Osteoarthritis Mother         lives with her sister    Coronary Artery Disease Father 33    Myocardial Infarction Father     Obesity Sister         1 son -  8    Diabetes Sister     Other Brother         trauma    Patient is unaware of any medical problems Daughter         delvis 3   Sofya 1    Hypertension Son         Leslie 1    Cancer Maternal Aunt         ovarian    Cancer, Ovarian Maternal Aunt     Cancer Maternal Grandmother         ovarian    Cancer, Ovarian Maternal Grandmother     Patient is unaware of any medical problems Other     Cancer, Breast Neg Hx     Cancer, Colon Neg Hx        Social History  Social History     Socioeconomic History    Marital status: /Civil Union     Spouse name: Chepe    Number of children: 2    Years of education: Not on file    Highest education level: Not on file   Occupational History    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   Tobacco Use    Smoking status: Never    Smokeless tobacco: Never   Vaping Use    Vaping status: never used   Substance and Sexual Activity    Alcohol use: Not Currently     Comment: occasionally    Drug use: Never    Sexual activity: Yes     Partners: Male   Other Topics Concern    Not on file   Social History Narrative    Not on file     Social Determinants of Health     Financial Resource Strain: Not on file   Food Insecurity: Low Risk  (11/13/2024)    Food Insecurity     Worried about Food: Never true     Food is Gone: Never true   Transportation Needs: Not At Risk (11/13/2024)    Transportation Needs     Lack of Reliable Transportation: No   Physical Activity: Insufficiently Active (10/14/2019)     Exercise Vital Sign     Days of Exercise per Week: 3 days     Minutes of Exercise per Session: 30 min   Stress: Not on file   Social Connections: Not on file   Interpersonal Safety: Not on file         Review of Systems  Review of Systems   Constitutional:  Positive for fatigue. Negative for unexpected weight change.   HENT:  Negative for congestion and trouble swallowing.    Eyes:  Negative for visual disturbance.   Respiratory:  Negative for cough and shortness of breath.    Cardiovascular:  Negative for chest pain, palpitations and leg swelling.   Neurological:  Negative for dizziness, syncope, light-headedness and headaches.       Objective    Visit Vitals  /77 (BP Location: LUE - Left upper extremity, Patient Position: Sitting, Cuff Size: Large Adult)   Pulse 77   Temp 98 °F (36.7 °C) (Temporal)   Resp 16   Ht 5' 7\" (1.702 m)   Wt 111.6 kg (246 lb)   SpO2 98%   BMI 38.53 kg/m²       Physical Exam  Constitutional:       General: She is not in acute distress.     Appearance: Normal appearance. She is obese. She is not ill-appearing.   HENT:      Mouth/Throat:      Mouth: Mucous membranes are moist.      Comments: Mallampati score of 4 noted  Eyes:      General: No scleral icterus.     Conjunctiva/sclera: Conjunctivae normal.   Cardiovascular:      Rate and Rhythm: Normal rate and regular rhythm.      Pulses: Normal pulses.      Heart sounds: Normal heart sounds. No murmur heard.  Pulmonary:      Effort: Pulmonary effort is normal. No respiratory distress.      Breath sounds: Normal breath sounds. No wheezing, rhonchi or rales.   Musculoskeletal:      Right lower leg: No edema.      Left lower leg: No edema.   Skin:     General: Skin is warm and dry.   Neurological:      General: No focal deficit present.      Mental Status: She is alert and oriented to person, place, and time. Mental status is at baseline.   Psychiatric:         Mood and Affect: Mood normal.         Behavior: Behavior normal.            Assessment  Preop examination    Obesity, morbid, BMI 40.0-49.9  (CMD)  - SERVICE TO SLEEP MEDICINE    Snoring  - SERVICE TO SLEEP MEDICINE      Discussion/After Visit Summary  -You were seen today for a pre-op visit prior to your upcoming left lumpectomy surgery with Dr. Salazar.   -We reviewed your recent blood work and EKG which were within normal limits.   -As discussed, please hold your blood pressure medication (Benicar) the day of the surgery and stop taking your supplements 7 days prior to the surgery as instructed.  -Given your history of snoring and risk factor assessment (STOP BANG score of 6) I have sent a referral to a sleep medicine physician for evaluation for sleep apnea after your surgery. This is not a reason for your surgery to be delayed as you have done well with anesthesia in the past. You are medically optimized for surgery at this time.  -Please contact the office with any further questions or concerns.     # Pre-operative evaluation  -According to the RCRI, this number of risk factors stratifies the patient to Class I, which carries a 3.9% risk of major CV complications. In this case, however, the RCRI potentially under estimates the patient's true cardiac risk given the history of history suggestive of sleep apnea.  - The risks of surgery were discussed with the patient, in the light of the benefits of possible surgery. This assessment will be conveyed to the surgery and anesthesia teams as indicated.   - The patient is medically optimized for surgery at this time. No further workup required.       Discussed with Dr. Barros.     Ramya Loaiza DO  PGY3, Internal Medicine

## 2025-07-15 ENCOUNTER — ANESTHESIA EVENT (OUTPATIENT)
Dept: SURGERY | Facility: HOSPITAL | Age: 75
End: 2025-07-15
Payer: COMMERCIAL

## 2025-07-15 RX ORDER — ONDANSETRON HYDROCHLORIDE 2 MG/ML
4 INJECTION, SOLUTION INTRAVENOUS DAILY PRN
OUTPATIENT
Start: 2025-07-15

## 2025-07-15 RX ORDER — SODIUM CHLORIDE 0.9 % (FLUSH) 0.9 %
3 SYRINGE (ML) INJECTION
OUTPATIENT
Start: 2025-07-15

## 2025-07-16 ENCOUNTER — HOSPITAL ENCOUNTER (OUTPATIENT)
Facility: HOSPITAL | Age: 75
Discharge: HOME | End: 2025-07-16
Attending: OPHTHALMOLOGY | Admitting: OPHTHALMOLOGY
Payer: COMMERCIAL

## 2025-07-16 ENCOUNTER — ANESTHESIA (OUTPATIENT)
Dept: SURGERY | Facility: HOSPITAL | Age: 75
End: 2025-07-16
Payer: COMMERCIAL

## 2025-07-16 VITALS
TEMPERATURE: 98 F | RESPIRATION RATE: 18 BRPM | DIASTOLIC BLOOD PRESSURE: 58 MMHG | OXYGEN SATURATION: 98 % | SYSTOLIC BLOOD PRESSURE: 121 MMHG | HEART RATE: 67 BPM

## 2025-07-16 DIAGNOSIS — H25.813 COMBINED FORMS OF AGE-RELATED CATARACT OF BOTH EYES: ICD-10-CM

## 2025-07-16 LAB — POCT GLUCOSE: 81 MG/DL (ref 70–110)

## 2025-07-16 PROCEDURE — 36000707: Performed by: OPHTHALMOLOGY

## 2025-07-16 PROCEDURE — 66984 XCAPSL CTRC RMVL W/O ECP: CPT | Mod: LT,,, | Performed by: OPHTHALMOLOGY

## 2025-07-16 PROCEDURE — 36000706: Performed by: OPHTHALMOLOGY

## 2025-07-16 PROCEDURE — V2632 POST CHMBR INTRAOCULAR LENS: HCPCS | Performed by: OPHTHALMOLOGY

## 2025-07-16 PROCEDURE — 63600175 PHARM REV CODE 636 W HCPCS: Performed by: NURSE ANESTHETIST, CERTIFIED REGISTERED

## 2025-07-16 PROCEDURE — 25000003 PHARM REV CODE 250: Performed by: OPHTHALMOLOGY

## 2025-07-16 PROCEDURE — 37000008 HC ANESTHESIA 1ST 15 MINUTES: Performed by: OPHTHALMOLOGY

## 2025-07-16 PROCEDURE — 37000009 HC ANESTHESIA EA ADD 15 MINS: Performed by: OPHTHALMOLOGY

## 2025-07-16 PROCEDURE — 63600175 PHARM REV CODE 636 W HCPCS: Performed by: OPHTHALMOLOGY

## 2025-07-16 PROCEDURE — 71000033 HC RECOVERY, INTIAL HOUR: Performed by: OPHTHALMOLOGY

## 2025-07-16 DEVICE — TECNIS 1-PC CLEAR MONO 6.0MM 29.0D
Type: IMPLANTABLE DEVICE | Site: EYE | Status: FUNCTIONAL
Brand: TECNIS IOL

## 2025-07-16 RX ORDER — TETRACAINE HYDROCHLORIDE 5 MG/ML
SOLUTION OPHTHALMIC
Status: DISCONTINUED | OUTPATIENT
Start: 2025-07-16 | End: 2025-07-16 | Stop reason: HOSPADM

## 2025-07-16 RX ORDER — ONDANSETRON HYDROCHLORIDE 2 MG/ML
INJECTION, SOLUTION INTRAVENOUS
Status: DISCONTINUED | OUTPATIENT
Start: 2025-07-16 | End: 2025-07-16

## 2025-07-16 RX ORDER — SODIUM CHLORIDE 9 MG/ML
INJECTION, SOLUTION INTRAVENOUS CONTINUOUS
Status: DISCONTINUED | OUTPATIENT
Start: 2025-07-16 | End: 2025-07-16 | Stop reason: HOSPADM

## 2025-07-16 RX ORDER — EPINEPHRINE 1 MG/ML
INJECTION, SOLUTION, CONCENTRATE INTRAVENOUS
Status: DISCONTINUED | OUTPATIENT
Start: 2025-07-16 | End: 2025-07-16 | Stop reason: HOSPADM

## 2025-07-16 RX ORDER — LIDOCAINE HYDROCHLORIDE 10 MG/ML
1 INJECTION, SOLUTION EPIDURAL; INFILTRATION; INTRACAUDAL; PERINEURAL ONCE
Status: DISCONTINUED | OUTPATIENT
Start: 2025-07-16 | End: 2025-07-16 | Stop reason: HOSPADM

## 2025-07-16 RX ORDER — CYCLOP/TROP/PROPA/PHEN/KET/WAT 1-1-0.1%
1 DROPS (EA) OPHTHALMIC (EYE)
Status: COMPLETED | OUTPATIENT
Start: 2025-07-16 | End: 2025-07-16

## 2025-07-16 RX ORDER — MOXIFLOXACIN 5 MG/ML
SOLUTION/ DROPS OPHTHALMIC
Status: DISCONTINUED | OUTPATIENT
Start: 2025-07-16 | End: 2025-07-16 | Stop reason: HOSPADM

## 2025-07-16 RX ORDER — LIDOCAINE HYDROCHLORIDE 40 MG/ML
INJECTION, SOLUTION RETROBULBAR
Status: DISCONTINUED | OUTPATIENT
Start: 2025-07-16 | End: 2025-07-16 | Stop reason: HOSPADM

## 2025-07-16 RX ORDER — MIDAZOLAM HYDROCHLORIDE 1 MG/ML
INJECTION INTRAMUSCULAR; INTRAVENOUS
Status: DISCONTINUED | OUTPATIENT
Start: 2025-07-16 | End: 2025-07-16

## 2025-07-16 RX ADMIN — Medication 1 DROP: at 10:07

## 2025-07-16 RX ADMIN — ONDANSETRON 4 MG: 2 INJECTION, SOLUTION INTRAMUSCULAR; INTRAVENOUS at 11:07

## 2025-07-16 RX ADMIN — Medication 1 DROP: at 09:07

## 2025-07-16 RX ADMIN — MIDAZOLAM HYDROCHLORIDE 2 MG: 1 INJECTION, SOLUTION INTRAMUSCULAR; INTRAVENOUS at 11:07

## 2025-07-16 NOTE — DISCHARGE SUMMARY
Former[facility], later [facility] - Periop Services  Discharge Note  Short Stay    Procedure(s) (LRB):  CEIOL OS (Left)      OUTCOME: Patient tolerated treatment/procedure well without complication and is now ready for discharge.    DISPOSITION: Home or Self Care    FINAL DIAGNOSIS:  cataract    FOLLOWUP: In clinic    DISCHARGE INSTRUCTIONS:  No discharge procedures on file.     TIME SPENT ON DISCHARGE: 5 minutes

## 2025-07-16 NOTE — ANESTHESIA POSTPROCEDURE EVALUATION
Anesthesia Post Evaluation    Patient: Hunter Cisneros    Procedure(s) Performed: Procedure(s) (LRB):  CEIOL OS (Left)    Final Anesthesia Type: MAC      Patient location during evaluation: PACU  Patient participation: Yes- Able to Participate  Level of consciousness: awake  Post-procedure vital signs: reviewed and stable  Pain management: adequate  Airway patency: patent    PONV status at discharge: No PONV  Anesthetic complications: no      Cardiovascular status: blood pressure returned to baseline and hemodynamically stable  Respiratory status: spontaneous ventilation  Hydration status: euvolemic  Follow-up not needed.              Vitals Value Taken Time   /58 07/16/25 12:32   Temp 36.6 °C (97.9 °F) 07/16/25 12:05   Pulse 66 07/16/25 12:35   Resp 18 07/16/25 12:30   SpO2 98 % 07/16/25 12:35   Vitals shown include unfiled device data.      Event Time   Out of Recovery 12:40:00         Pain/Ryan Score: Ryan Score: 10 (7/16/2025 12:20 PM)

## 2025-07-16 NOTE — ANESTHESIA PREPROCEDURE EVALUATION
07/16/2025  Hunter Cisneros is a 74 y.o., female.      Pre-op Assessment    I have reviewed the Patient Summary Reports.     I have reviewed the Nursing Notes. I have reviewed the NPO Status.   I have reviewed the Medications.     Review of Systems  EENT/Dental:   Phaco           Cardiovascular:     Hypertension                                          Pulmonary:  Pulmonary Normal                       Renal/:  Chronic Renal Disease                Hepatic/GI:     GERD                Endocrine:  Diabetes Hypothyroidism              Physical Exam  General: Well nourished    Airway:  Mallampati: II   Neck ROM: Normal ROM    Dental:  Intact        Anesthesia Plan  Type of Anesthesia, risks & benefits discussed:    Anesthesia Type: MAC  Intra-op Monitoring Plan: Standard ASA Monitors  Post Op Pain Control Plan: multimodal analgesia and IV/PO Opioids PRN  Induction:  IV  Informed Consent: Informed consent signed with the Patient and all parties understand the risks and agree with anesthesia plan.  All questions answered.   ASA Score: 2    Ready For Surgery From Anesthesia Perspective.     .

## 2025-07-16 NOTE — OP NOTE
Operative Date:  07/16/2025    Discharge Date:  07/16/2025    Report Title: Operative Note    SURGEON: Neymar Pagan MD    ASSISTANT: None    PREOPERATIVE DIAGNOSIS: Combined form of senile cataract,  Left Eye    POSTOPERATIVE DIAGNOSIS: same    PROCEDURE PERFORMED: Phacoemulsification of the cataract with posterior chamber intraocular lens Left Eye    IMPLANTS: ZCBOO 29.0    ANESTHESIA:  Topical with MAC    COMPLICATIONS: None    ESTIMATED BLOOD LOSS: Minimal    PROCEDURE: The patient was brought to the operating room, time out was performed and implant checked.  The patient was given light sedation, and topical anesthesia was instilled in the left eye.  The left eye was prepped and draped in the usual fashion for eye surgery and lid speculum used to retract the eyelid. The eyelashes were secluded within the drape.  A paracentesis was made inferiorly with a sideport blade. Epishugarcaine was injected in the anterior chamber and dispersive viscoelastic was injected into the anterior chamber. A temporal corneal incision was made with a steel keratome. A cystitome was used to initiate a continuous curvilinear capsulorrhexis and completed using the capsulorrhexis forceps. Hydrodissection of the lens nucleus was performed using balanced salt solution (BSS) on hydrodissection cannula. The lens nucleus was removed using phacoemulsification in the modified stop and chop technique. The lens cortex was removed using the irrigation/aspiration handpiece. The capsular bag was filled with viscoelastic, incision was made larger using keratome, and the intraocular lens was injected into the capsular bag under direct visualization. Viscoelastic was removed using the irrigation/aspiration handpiece. The wounds were hydrated until watertight.      The wounds were rechecked and no leakage was noted.  The speculum was removed. Topical antibiotic was applied to the eye and shield was placed over the eye. The patient tolerated the  procedure well and left the operating room in good condition.

## 2025-07-16 NOTE — TRANSFER OF CARE
Anesthesia Transfer of Care Note    Patient: Hunter Cisneros    Procedure(s) Performed: Procedure(s) (LRB):  CEIOL OS (Left)    Patient location: PACU    Anesthesia Type: MAC    Transport from OR: Transported from OR on room air with adequate spontaneous ventilation    Post pain: adequate analgesia    Post assessment: no apparent anesthetic complications and tolerated procedure well    Post vital signs: stable    Level of consciousness: awake, alert and oriented    Nausea/Vomiting: no nausea/vomiting    Complications: none    Transfer of care protocol was followed      Last vitals: Visit Vitals  BP (!) 173/73 (BP Location: Right arm, Patient Position: Sitting)   Pulse 65   Temp 36.2 °C (97.1 °F) (Skin)   Resp 18   SpO2 100%   Breastfeeding No

## 2025-07-16 NOTE — H&P
History    Chief complaint:  Painless progressive vision loss left Eye    Present Ilness/Diagnosis: Visually significant cataract, left Eye    ROS: +Eyes, otherwise no significant changes    Past Medical History: refer to chart    Family History/Social History: refer to chart    Allergies:   Review of patient's allergies indicates:   Allergen Reactions    Shellfish containing products Nausea And Vomiting    Lisinopril      cough       Current Medications: see medcard      Physical Exam    BP: Vital signs stable  General: No apparent distress  HEENT: cataract  Lungs: adequate respirations  Heart: + pulses  Abdomen: soft  Rectal/pelvic: deferred    Labs: Labs Reviewed    Lab Results   Component Value Date    WBC 9.43 06/19/2025    HGB 11.6 (L) 06/19/2025    HCT 36.7 (L) 06/19/2025    MCV 88 06/19/2025     06/19/2025           CMP  Sodium   Date Value Ref Range Status   06/19/2025 140 136 - 145 mmol/L Final   01/28/2025 139 136 - 145 mmol/L Final     Potassium   Date Value Ref Range Status   06/19/2025 3.6 3.5 - 5.1 mmol/L Final   01/28/2025 4.5 3.5 - 5.1 mmol/L Final     Chloride   Date Value Ref Range Status   06/19/2025 108 95 - 110 mmol/L Final   01/28/2025 107 95 - 110 mmol/L Final     CO2   Date Value Ref Range Status   06/19/2025 23 23 - 29 mmol/L Final   01/28/2025 23 23 - 29 mmol/L Final     Glucose   Date Value Ref Range Status   06/19/2025 78 70 - 110 mg/dL Final   01/28/2025 78 70 - 110 mg/dL Final     BUN   Date Value Ref Range Status   06/19/2025 24 (H) 8 - 23 mg/dL Final     Creatinine   Date Value Ref Range Status   06/19/2025 1.4 0.5 - 1.4 mg/dL Final     Calcium   Date Value Ref Range Status   06/19/2025 10.0 8.7 - 10.5 mg/dL Final   01/28/2025 9.9 8.7 - 10.5 mg/dL Final     Protein Total   Date Value Ref Range Status   06/19/2025 7.0 6.0 - 8.4 gm/dL Final     Total Protein   Date Value Ref Range Status   01/28/2025 7.5 6.0 - 8.4 g/dL Final     Albumin   Date Value Ref Range Status   06/19/2025  3.7 3.5 - 5.2 g/dL Final   01/28/2025 3.6 3.5 - 5.2 g/dL Final     Total Bilirubin   Date Value Ref Range Status   01/28/2025 0.5 0.1 - 1.0 mg/dL Final     Comment:     For infants and newborns, interpretation of results should be based  on gestational age, weight and in agreement with clinical  observations.    Premature Infant recommended reference ranges:  Up to 24 hours.............<8.0 mg/dL  Up to 48 hours............<12.0 mg/dL  3-5 days..................<15.0 mg/dL  6-29 days.................<15.0 mg/dL       Bilirubin Total   Date Value Ref Range Status   06/19/2025 0.4 0.1 - 1.0 mg/dL Final     Comment:     For infants and newborns, interpretation of results should be based   on gestational age, weight and in agreement with clinical   observations.    Premature Infant recommended reference ranges:   0-24 hours:  <8.0 mg/dL   24-48 hours: <12.0 mg/dL   3-5 days:    <15.0 mg/dL   6-29 days:   <15.0 mg/dL     Alkaline Phosphatase   Date Value Ref Range Status   01/28/2025 71 40 - 150 U/L Final     ALP   Date Value Ref Range Status   06/19/2025 72 40 - 150 unit/L Final     AST   Date Value Ref Range Status   06/19/2025 27 11 - 45 unit/L Final   01/28/2025 19 10 - 40 U/L Final     ALT   Date Value Ref Range Status   06/19/2025 23 10 - 44 unit/L Final   01/28/2025 14 10 - 44 U/L Final     Anion Gap   Date Value Ref Range Status   06/19/2025 9 8 - 16 mmol/L Final     eGFR   Date Value Ref Range Status   06/19/2025 40 (L) >60 mL/min/1.73/m2 Final     Comment:     Estimated GFR calculated using the CKD-EPI creatinine (2021) equation.   01/28/2025 48 (A) >60 mL/min/1.73 m^2 Final       The patient has been cleared for surgery in an ambulatory surgery facility.     Impression: Visually significant Cataract left Eye    Plan: Phacoemulsification with implantation of Intraocular lens left Eye

## 2025-07-17 ENCOUNTER — OFFICE VISIT (OUTPATIENT)
Dept: OPHTHALMOLOGY | Facility: CLINIC | Age: 75
End: 2025-07-17
Payer: COMMERCIAL

## 2025-07-17 DIAGNOSIS — Z98.42 STATUS POST CATARACT SURGERY, LEFT: Primary | ICD-10-CM

## 2025-07-17 PROCEDURE — 3288F FALL RISK ASSESSMENT DOCD: CPT | Mod: CPTII,S$GLB,, | Performed by: OPHTHALMOLOGY

## 2025-07-17 PROCEDURE — 1126F AMNT PAIN NOTED NONE PRSNT: CPT | Mod: CPTII,S$GLB,, | Performed by: OPHTHALMOLOGY

## 2025-07-17 PROCEDURE — 1101F PT FALLS ASSESS-DOCD LE1/YR: CPT | Mod: CPTII,S$GLB,, | Performed by: OPHTHALMOLOGY

## 2025-07-17 PROCEDURE — 3060F POS MICROALBUMINURIA REV: CPT | Mod: CPTII,S$GLB,, | Performed by: OPHTHALMOLOGY

## 2025-07-17 PROCEDURE — 4010F ACE/ARB THERAPY RXD/TAKEN: CPT | Mod: CPTII,S$GLB,, | Performed by: OPHTHALMOLOGY

## 2025-07-17 PROCEDURE — 3066F NEPHROPATHY DOC TX: CPT | Mod: CPTII,S$GLB,, | Performed by: OPHTHALMOLOGY

## 2025-07-17 PROCEDURE — 99999 PR PBB SHADOW E&M-EST. PATIENT-LVL IV: CPT | Mod: PBBFAC,,, | Performed by: OPHTHALMOLOGY

## 2025-07-17 PROCEDURE — 1159F MED LIST DOCD IN RCRD: CPT | Mod: CPTII,S$GLB,, | Performed by: OPHTHALMOLOGY

## 2025-07-17 PROCEDURE — 99024 POSTOP FOLLOW-UP VISIT: CPT | Mod: S$GLB,,, | Performed by: OPHTHALMOLOGY

## 2025-07-17 PROCEDURE — 3044F HG A1C LEVEL LT 7.0%: CPT | Mod: CPTII,S$GLB,, | Performed by: OPHTHALMOLOGY

## 2025-07-17 PROCEDURE — 1160F RVW MEDS BY RX/DR IN RCRD: CPT | Mod: CPTII,S$GLB,, | Performed by: OPHTHALMOLOGY

## 2025-07-17 NOTE — PROGRESS NOTES
HPI    1 day s/p phaco IOL OS done on 7/16/2025    Pt states OS is doing well. Denies pain/ FOL/ Floaters.     States compliant with Post op gtts. Using Pred, Moxi, Keto OS QID as   directed.     Last edited by Arlyn Estebna on 7/17/2025  8:50 AM.            Assessment /Plan     For exam results, see Encounter Report.    Status post cataract surgery, left      Doing well  Post op precautions and instructions reviewed, sheet given  moxi QID  PF QID  Keto qdaily    Stop latanoprost (OHTN) OU  Continue frank qam OU    F/u 1 week, brief refract OS (high cyl), PW for OD

## 2025-07-24 ENCOUNTER — OFFICE VISIT (OUTPATIENT)
Dept: OPHTHALMOLOGY | Facility: CLINIC | Age: 75
End: 2025-07-24
Payer: COMMERCIAL

## 2025-07-24 DIAGNOSIS — Z98.42 STATUS POST CATARACT SURGERY, LEFT: Primary | ICD-10-CM

## 2025-07-24 DIAGNOSIS — H25.811 COMBINED FORMS OF AGE-RELATED CATARACT OF RIGHT EYE: ICD-10-CM

## 2025-07-24 PROCEDURE — 99999 PR PBB SHADOW E&M-EST. PATIENT-LVL IV: CPT | Mod: PBBFAC,,, | Performed by: OPHTHALMOLOGY

## 2025-07-24 RX ORDER — KETOROLAC TROMETHAMINE 5 MG/ML
1 SOLUTION OPHTHALMIC 4 TIMES DAILY
Qty: 5 ML | Refills: 2 | Status: SHIPPED | OUTPATIENT
Start: 2025-07-24

## 2025-07-24 RX ORDER — MOXIFLOXACIN 5 MG/ML
1 SOLUTION/ DROPS OPHTHALMIC 4 TIMES DAILY
Qty: 3 ML | Refills: 0 | Status: SHIPPED | OUTPATIENT
Start: 2025-07-24 | End: 2025-08-01

## 2025-07-24 RX ORDER — CYCLOP/TROP/PROPA/PHEN/KET/WAT 1-1-0.1%
1 DROPS (EA) OPHTHALMIC (EYE)
OUTPATIENT
Start: 2025-07-24 | End: 2025-07-24

## 2025-07-24 RX ORDER — SODIUM CHLORIDE 9 MG/ML
INJECTION, SOLUTION INTRAVENOUS CONTINUOUS
OUTPATIENT
Start: 2025-07-24

## 2025-07-24 RX ORDER — PREDNISOLONE ACETATE 10 MG/ML
1 SUSPENSION/ DROPS OPHTHALMIC 4 TIMES DAILY
Qty: 5 ML | Refills: 2 | Status: SHIPPED | OUTPATIENT
Start: 2025-07-24

## 2025-07-24 NOTE — PROGRESS NOTES
HPI    1 week PO OS w/ ppw OD scheduled for 8/20     Pt states no new complaints. OS is doing well.     Denies pain/ FOL/ floaters.     Using PO gtts as directed.   Last edited by María Katz on 7/24/2025  9:36 AM.            Assessment /Plan     For exam results, see Encounter Report.    Status post cataract surgery, left    Combined forms of age-related cataract of right eye      1. Status post cataract surgery, left (Primary)  POW1 CEIOL  Doing well  D/c moxi  Continue PF QID with taper  Continue keto qdaily  Post op instructions reviewed    2. Combined forms of age-related cataract of right eye  Patient with visually significant cataract impacting abiliity ADLs (reading, driving, PM driving, glare).  Discussed options, R & B, expectations, patient voices good understanding and wishes to proceed with procedure. Patient will likely benefit from sx and signed consent.    Proceed with CEIOL OD  Monofocal dist IOL  Pt declines toric

## 2025-08-19 ENCOUNTER — ANESTHESIA EVENT (OUTPATIENT)
Dept: SURGERY | Facility: HOSPITAL | Age: 75
End: 2025-08-19
Payer: COMMERCIAL

## 2025-08-19 RX ORDER — GLUCAGON 1 MG
1 KIT INJECTION
OUTPATIENT
Start: 2025-08-19

## 2025-08-19 RX ORDER — DIPHENHYDRAMINE HYDROCHLORIDE 50 MG/ML
12.5 INJECTION, SOLUTION INTRAMUSCULAR; INTRAVENOUS EVERY 6 HOURS PRN
OUTPATIENT
Start: 2025-08-19

## 2025-08-19 RX ORDER — FENTANYL CITRATE 50 UG/ML
25 INJECTION, SOLUTION INTRAMUSCULAR; INTRAVENOUS EVERY 5 MIN PRN
Refills: 0 | OUTPATIENT
Start: 2025-08-19

## 2025-08-19 RX ORDER — ONDANSETRON HYDROCHLORIDE 2 MG/ML
4 INJECTION, SOLUTION INTRAVENOUS ONCE AS NEEDED
OUTPATIENT
Start: 2025-08-19 | End: 2037-01-15

## 2025-08-19 RX ORDER — HYDROMORPHONE HYDROCHLORIDE 2 MG/ML
0.2 INJECTION, SOLUTION INTRAMUSCULAR; INTRAVENOUS; SUBCUTANEOUS EVERY 5 MIN PRN
Refills: 0 | OUTPATIENT
Start: 2025-08-19

## 2025-08-19 RX ORDER — OXYCODONE HYDROCHLORIDE 5 MG/1
5 TABLET ORAL
Refills: 0 | OUTPATIENT
Start: 2025-08-19

## 2025-08-20 ENCOUNTER — ANESTHESIA (OUTPATIENT)
Dept: SURGERY | Facility: HOSPITAL | Age: 75
End: 2025-08-20
Payer: COMMERCIAL

## 2025-08-20 ENCOUNTER — HOSPITAL ENCOUNTER (OUTPATIENT)
Facility: HOSPITAL | Age: 75
Discharge: HOME | End: 2025-08-20
Attending: OPHTHALMOLOGY | Admitting: OPHTHALMOLOGY
Payer: COMMERCIAL

## 2025-08-20 DIAGNOSIS — H25.811 COMBINED FORMS OF AGE-RELATED CATARACT OF RIGHT EYE: ICD-10-CM

## 2025-08-20 DIAGNOSIS — Z98.42 STATUS POST CATARACT SURGERY, LEFT: ICD-10-CM

## 2025-08-20 LAB — POCT GLUCOSE: 88 MG/DL (ref 70–110)

## 2025-08-20 PROCEDURE — 37000009 HC ANESTHESIA EA ADD 15 MINS: Performed by: OPHTHALMOLOGY

## 2025-08-20 PROCEDURE — 25000003 PHARM REV CODE 250: Performed by: OPHTHALMOLOGY

## 2025-08-20 PROCEDURE — 66984 XCAPSL CTRC RMVL W/O ECP: CPT | Mod: 79,RT,, | Performed by: OPHTHALMOLOGY

## 2025-08-20 PROCEDURE — 37000008 HC ANESTHESIA 1ST 15 MINUTES: Performed by: OPHTHALMOLOGY

## 2025-08-20 PROCEDURE — 36000706: Performed by: OPHTHALMOLOGY

## 2025-08-20 PROCEDURE — 63600175 PHARM REV CODE 636 W HCPCS: Performed by: NURSE ANESTHETIST, CERTIFIED REGISTERED

## 2025-08-20 PROCEDURE — 71000033 HC RECOVERY, INTIAL HOUR: Performed by: OPHTHALMOLOGY

## 2025-08-20 PROCEDURE — 36000707: Performed by: OPHTHALMOLOGY

## 2025-08-20 PROCEDURE — V2632 POST CHMBR INTRAOCULAR LENS: HCPCS | Performed by: OPHTHALMOLOGY

## 2025-08-20 PROCEDURE — 25000003 PHARM REV CODE 250

## 2025-08-20 PROCEDURE — 63600175 PHARM REV CODE 636 W HCPCS: Performed by: OPHTHALMOLOGY

## 2025-08-20 DEVICE — IMPLANTABLE DEVICE: Type: IMPLANTABLE DEVICE | Site: EYE | Status: FUNCTIONAL

## 2025-08-20 RX ORDER — TETRACAINE HYDROCHLORIDE 5 MG/ML
SOLUTION OPHTHALMIC
Status: DISCONTINUED | OUTPATIENT
Start: 2025-08-20 | End: 2025-08-20 | Stop reason: HOSPADM

## 2025-08-20 RX ORDER — LIDOCAINE HYDROCHLORIDE 10 MG/ML
1 INJECTION, SOLUTION EPIDURAL; INFILTRATION; INTRACAUDAL; PERINEURAL ONCE
Status: DISCONTINUED | OUTPATIENT
Start: 2025-08-20 | End: 2025-08-20 | Stop reason: HOSPADM

## 2025-08-20 RX ORDER — CYCLOP/TROP/PROPA/PHEN/KET/WAT 1-1-0.1%
1 DROPS (EA) OPHTHALMIC (EYE)
Status: COMPLETED | OUTPATIENT
Start: 2025-08-20 | End: 2025-08-20

## 2025-08-20 RX ORDER — MOXIFLOXACIN 5 MG/ML
SOLUTION/ DROPS OPHTHALMIC
Status: DISCONTINUED | OUTPATIENT
Start: 2025-08-20 | End: 2025-08-20 | Stop reason: HOSPADM

## 2025-08-20 RX ORDER — LIDOCAINE HYDROCHLORIDE 40 MG/ML
INJECTION, SOLUTION RETROBULBAR
Status: DISCONTINUED | OUTPATIENT
Start: 2025-08-20 | End: 2025-08-20 | Stop reason: HOSPADM

## 2025-08-20 RX ORDER — SODIUM CHLORIDE 9 MG/ML
INJECTION, SOLUTION INTRAVENOUS CONTINUOUS
Status: DISCONTINUED | OUTPATIENT
Start: 2025-08-20 | End: 2025-08-20

## 2025-08-20 RX ORDER — ONDANSETRON HYDROCHLORIDE 2 MG/ML
INJECTION, SOLUTION INTRAMUSCULAR; INTRAVENOUS
Status: DISCONTINUED | OUTPATIENT
Start: 2025-08-20 | End: 2025-08-20

## 2025-08-20 RX ORDER — MIDAZOLAM HYDROCHLORIDE 1 MG/ML
INJECTION INTRAMUSCULAR; INTRAVENOUS
Status: DISCONTINUED | OUTPATIENT
Start: 2025-08-20 | End: 2025-08-20

## 2025-08-20 RX ORDER — SODIUM CHLORIDE, SODIUM LACTATE, POTASSIUM CHLORIDE, CALCIUM CHLORIDE 600; 310; 30; 20 MG/100ML; MG/100ML; MG/100ML; MG/100ML
INJECTION, SOLUTION INTRAVENOUS CONTINUOUS
Status: DISCONTINUED | OUTPATIENT
Start: 2025-08-20 | End: 2025-08-20

## 2025-08-20 RX ORDER — EPINEPHRINE 1 MG/ML
INJECTION, SOLUTION, CONCENTRATE INTRAVENOUS
Status: DISCONTINUED | OUTPATIENT
Start: 2025-08-20 | End: 2025-08-20 | Stop reason: HOSPADM

## 2025-08-20 RX ADMIN — Medication 1 DROP: at 07:08

## 2025-08-20 RX ADMIN — MIDAZOLAM HYDROCHLORIDE 2 MG: 1 INJECTION, SOLUTION INTRAMUSCULAR; INTRAVENOUS at 08:08

## 2025-08-20 RX ADMIN — ONDANSETRON 4 MG: 2 INJECTION INTRAMUSCULAR; INTRAVENOUS at 08:08

## 2025-08-21 ENCOUNTER — OFFICE VISIT (OUTPATIENT)
Dept: OPHTHALMOLOGY | Facility: CLINIC | Age: 75
End: 2025-08-21
Payer: COMMERCIAL

## 2025-08-21 VITALS
OXYGEN SATURATION: 100 % | HEIGHT: 61 IN | HEART RATE: 62 BPM | WEIGHT: 162.94 LBS | TEMPERATURE: 98 F | SYSTOLIC BLOOD PRESSURE: 121 MMHG | RESPIRATION RATE: 20 BRPM | BODY MASS INDEX: 30.76 KG/M2 | DIASTOLIC BLOOD PRESSURE: 58 MMHG

## 2025-08-21 DIAGNOSIS — Z98.41 STATUS POST CATARACT SURGERY, RIGHT: Primary | ICD-10-CM

## 2025-08-21 PROCEDURE — 1159F MED LIST DOCD IN RCRD: CPT | Mod: CPTII,S$GLB,, | Performed by: OPHTHALMOLOGY

## 2025-08-21 PROCEDURE — 99024 POSTOP FOLLOW-UP VISIT: CPT | Mod: S$GLB,,, | Performed by: OPHTHALMOLOGY

## 2025-08-21 PROCEDURE — 1126F AMNT PAIN NOTED NONE PRSNT: CPT | Mod: CPTII,S$GLB,, | Performed by: OPHTHALMOLOGY

## 2025-08-21 PROCEDURE — 4010F ACE/ARB THERAPY RXD/TAKEN: CPT | Mod: CPTII,S$GLB,, | Performed by: OPHTHALMOLOGY

## 2025-08-21 PROCEDURE — 3066F NEPHROPATHY DOC TX: CPT | Mod: CPTII,S$GLB,, | Performed by: OPHTHALMOLOGY

## 2025-08-21 PROCEDURE — 1101F PT FALLS ASSESS-DOCD LE1/YR: CPT | Mod: CPTII,S$GLB,, | Performed by: OPHTHALMOLOGY

## 2025-08-21 PROCEDURE — 3288F FALL RISK ASSESSMENT DOCD: CPT | Mod: CPTII,S$GLB,, | Performed by: OPHTHALMOLOGY

## 2025-08-21 PROCEDURE — 3044F HG A1C LEVEL LT 7.0%: CPT | Mod: CPTII,S$GLB,, | Performed by: OPHTHALMOLOGY

## 2025-08-21 PROCEDURE — 3060F POS MICROALBUMINURIA REV: CPT | Mod: CPTII,S$GLB,, | Performed by: OPHTHALMOLOGY

## 2025-08-21 PROCEDURE — 99999 PR PBB SHADOW E&M-EST. PATIENT-LVL III: CPT | Mod: PBBFAC,,, | Performed by: OPHTHALMOLOGY

## 2025-08-21 RX ORDER — TIMOLOL MALEATE 5 MG/ML
1 SOLUTION/ DROPS OPHTHALMIC EVERY MORNING
COMMUNITY
Start: 2025-08-12

## 2025-08-28 ENCOUNTER — OFFICE VISIT (OUTPATIENT)
Dept: OPHTHALMOLOGY | Facility: CLINIC | Age: 75
End: 2025-08-28
Payer: COMMERCIAL

## 2025-08-28 DIAGNOSIS — Z98.41 STATUS POST CATARACT SURGERY, RIGHT: Primary | ICD-10-CM

## 2025-08-28 PROCEDURE — 4010F ACE/ARB THERAPY RXD/TAKEN: CPT | Mod: CPTII,S$GLB,, | Performed by: OPHTHALMOLOGY

## 2025-08-28 PROCEDURE — 3288F FALL RISK ASSESSMENT DOCD: CPT | Mod: CPTII,S$GLB,, | Performed by: OPHTHALMOLOGY

## 2025-08-28 PROCEDURE — 1159F MED LIST DOCD IN RCRD: CPT | Mod: CPTII,S$GLB,, | Performed by: OPHTHALMOLOGY

## 2025-08-28 PROCEDURE — 3044F HG A1C LEVEL LT 7.0%: CPT | Mod: CPTII,S$GLB,, | Performed by: OPHTHALMOLOGY

## 2025-08-28 PROCEDURE — 1101F PT FALLS ASSESS-DOCD LE1/YR: CPT | Mod: CPTII,S$GLB,, | Performed by: OPHTHALMOLOGY

## 2025-08-28 PROCEDURE — 1126F AMNT PAIN NOTED NONE PRSNT: CPT | Mod: CPTII,S$GLB,, | Performed by: OPHTHALMOLOGY

## 2025-08-28 PROCEDURE — 99999 PR PBB SHADOW E&M-EST. PATIENT-LVL III: CPT | Mod: PBBFAC,,, | Performed by: OPHTHALMOLOGY

## 2025-08-28 PROCEDURE — 99024 POSTOP FOLLOW-UP VISIT: CPT | Mod: S$GLB,,, | Performed by: OPHTHALMOLOGY

## 2025-08-28 PROCEDURE — 3060F POS MICROALBUMINURIA REV: CPT | Mod: CPTII,S$GLB,, | Performed by: OPHTHALMOLOGY

## 2025-08-28 PROCEDURE — 3066F NEPHROPATHY DOC TX: CPT | Mod: CPTII,S$GLB,, | Performed by: OPHTHALMOLOGY

## (undated) DEVICE — CYSTOTOME IRRIG 24G 13MM

## (undated) DEVICE — SOL BETADINE 5%

## (undated) DEVICE — GLOVE SENSICARE PI ALOE 7.5

## (undated) DEVICE — TAPE SILICONE 1 X1 1/2

## (undated) DEVICE — KIT POST CATARACT SURGERY

## (undated) DEVICE — PACK CUSTOM EYE KIT